# Patient Record
Sex: MALE | Race: WHITE | NOT HISPANIC OR LATINO | Employment: OTHER | URBAN - METROPOLITAN AREA
[De-identification: names, ages, dates, MRNs, and addresses within clinical notes are randomized per-mention and may not be internally consistent; named-entity substitution may affect disease eponyms.]

---

## 2022-04-15 ENCOUNTER — LAB (OUTPATIENT)
Dept: LAB | Facility: HOSPITAL | Age: 52
End: 2022-04-15

## 2022-04-15 ENCOUNTER — TRANSCRIBE ORDERS (OUTPATIENT)
Dept: ADMINISTRATIVE | Facility: HOSPITAL | Age: 52
End: 2022-04-15

## 2022-04-15 DIAGNOSIS — I10 UNCONTROLLED HYPERTENSION: ICD-10-CM

## 2022-04-15 DIAGNOSIS — I10 UNCONTROLLED HYPERTENSION: Primary | ICD-10-CM

## 2022-04-15 PROCEDURE — 82088 ASSAY OF ALDOSTERONE: CPT

## 2022-04-15 PROCEDURE — 36415 COLL VENOUS BLD VENIPUNCTURE: CPT

## 2022-04-15 PROCEDURE — 84244 ASSAY OF RENIN: CPT

## 2022-04-28 LAB
ALDOST SERPL-MCNC: 13.4 NG/DL (ref 0–30)
ALDOST/RENIN PLAS-RTO: 5 {RATIO} (ref 0–30)
RENIN PLAS-CCNC: 2.69 NG/ML/HR (ref 0.17–5.38)

## 2022-05-11 ENCOUNTER — TRANSCRIBE ORDERS (OUTPATIENT)
Dept: ADMINISTRATIVE | Facility: HOSPITAL | Age: 52
End: 2022-05-11

## 2022-05-11 DIAGNOSIS — N18.6 END STAGE RENAL FAILURE ON DIALYSIS: Primary | ICD-10-CM

## 2022-05-11 DIAGNOSIS — Z99.2 END STAGE RENAL FAILURE ON DIALYSIS: Primary | ICD-10-CM

## 2022-05-13 ENCOUNTER — OFFICE VISIT (OUTPATIENT)
Dept: SURGERY | Facility: CLINIC | Age: 52
End: 2022-05-13

## 2022-05-13 VITALS — HEIGHT: 68 IN | BODY MASS INDEX: 27.61 KG/M2 | WEIGHT: 182.2 LBS

## 2022-05-13 DIAGNOSIS — R10.9 ABDOMINAL PAIN, UNSPECIFIED ABDOMINAL LOCATION: Primary | ICD-10-CM

## 2022-05-13 PROCEDURE — 89051 BODY FLUID CELL COUNT: CPT | Performed by: SURGERY

## 2022-05-13 PROCEDURE — 99203 OFFICE O/P NEW LOW 30 MIN: CPT | Performed by: SURGERY

## 2022-05-13 PROCEDURE — 87015 SPECIMEN INFECT AGNT CONCNTJ: CPT | Performed by: SURGERY

## 2022-05-13 PROCEDURE — 87070 CULTURE OTHR SPECIMN AEROBIC: CPT | Performed by: SURGERY

## 2022-05-13 PROCEDURE — 87205 SMEAR GRAM STAIN: CPT | Performed by: SURGERY

## 2022-05-13 RX ORDER — CLONIDINE HYDROCHLORIDE 0.1 MG/1
0.1 TABLET, EXTENDED RELEASE ORAL DAILY
COMMUNITY
Start: 2022-04-07 | End: 2022-07-08

## 2022-05-13 RX ORDER — CARVEDILOL 25 MG/1
25 TABLET ORAL 2 TIMES DAILY
COMMUNITY
Start: 2022-03-15

## 2022-05-13 RX ORDER — GENTAMICIN SULFATE 1 MG/G
1 CREAM TOPICAL DAILY
COMMUNITY
Start: 2022-04-24 | End: 2022-11-07

## 2022-05-13 RX ORDER — CALCITRIOL 0.25 UG/1
0.25 CAPSULE, LIQUID FILLED ORAL DAILY
COMMUNITY
Start: 2022-04-07 | End: 2022-10-17

## 2022-05-13 RX ORDER — HYDRALAZINE HYDROCHLORIDE 100 MG/1
100 TABLET, FILM COATED ORAL 3 TIMES DAILY
COMMUNITY
Start: 2022-04-07

## 2022-05-13 RX ORDER — NIFEDIPINE 30 MG/1
30 TABLET, EXTENDED RELEASE ORAL DAILY
COMMUNITY
Start: 2022-04-24 | End: 2022-07-08

## 2022-05-13 RX ORDER — POLYETHYLENE GLYCOL 3350 17 G/17G
17 POWDER, FOR SOLUTION ORAL 2 TIMES DAILY PRN
Qty: 60 EACH | Refills: 2 | Status: SHIPPED | OUTPATIENT
Start: 2022-05-13

## 2022-05-13 RX ORDER — ERGOCALCIFEROL 1.25 MG/1
50000 CAPSULE ORAL WEEKLY
COMMUNITY
Start: 2022-04-23

## 2022-05-13 NOTE — PROGRESS NOTES
Chief Complaint   Patient presents with   • NEW PT- LOWER ABDONMINAL PAIN       Subjective      Saravanan Hutchison is a 51 y.o. male who is referred by Dr. Vallejo for evaluation of abdominal pain with peritoneal dialysis.  The patient has end-stage renal disease and has been on peritoneal dialysis for approximately 1 year.  The catheter was placed in [place].  He never have any problem with the catheter until last month when he started having pain when catheter is draining peritoneal fluid.  Initially the pain was mild and associated only with draining with .  For the past week the pain has been getting persistently worse, the pain is located at the suprapubic area and radiates to bilateral testicles and rectum.  The pain is sharp in nature and exacerbated by peritoneal fluid drainage.  He does not have any pain with peritoneal fluid and surgeon.  Pains get better with stopping peritoneal drainage.  He has not have any problem draining the catheter otherwise.  He denies any fevers or chills.  He did not notice any change in the color of the fluid.  Patient reports constipation of 1 hard bowel movement every 2 days.  He takes lactulose for chronic constipation.  He reports no fevers but complains of chills.  He has been able to tolerate diet without any nausea or vomiting.  He denies any bulging on the abdominal wall.     Past Medical History:   Diagnosis Date   • CHF (congestive heart failure) (HCC) May 2021   •  End-stage renal disease on peritoneal dialysis 2021   • Coronary artery disease May 2021   • Hypertension Sept. 1990     Past Surgical History:   Procedure Laterality Date   • APPENDECTOMY  1994   -Peritoneal dialysis catheter placement 2021      Current Outpatient Medications:   •  carvedilol (COREG) 25 MG tablet, Take 25 mg by mouth 2 (Two) Times a Day., Disp: , Rfl:   •  cloNIDine HCl ER 0.1 MG tablet sustained-release 12 hour tablet, Take 0.1 mg by mouth Daily., Disp: , Rfl:   •  gentamicin (GARAMYCIN)  0.1 % cream, APPLY TO EXIT SITE AS DIRECTED, Disp: , Rfl:   •  hydrALAZINE (APRESOLINE) 100 MG tablet, Take 100 mg by mouth 3 (Three) Times a Day., Disp: , Rfl:   •  NIFEdipine XL (PROCARDIA XL) 30 MG 24 hr tablet, Take 30 mg by mouth Daily., Disp: , Rfl:   •  calcitriol (ROCALTROL) 0.25 MCG capsule, Take 0.25 mcg by mouth Daily., Disp: , Rfl:   •  polyethylene glycol (MIRALAX) 17 g packet, Take 17 g by mouth 2 (Two) Times a Day As Needed (constipation)., Disp: 60 each, Rfl: 2  •  vitamin D (ERGOCALCIFEROL) 1.25 MG (55434 UT) capsule capsule, Take 50,000 Units by mouth 1 (One) Time Per Week., Disp: , Rfl:     No Known Allergies    Family History   Problem Relation Age of Onset   • Cancer Mother    • Hypertension Mother    • Heart disease Maternal Grandfather        Social History     Socioeconomic History   • Marital status: Single   Tobacco Use   • Smoking status: Former Smoker     Packs/day: 1.00     Years: 20.00     Pack years: 20.00     Types: Cigarettes     Start date: 3/15/1987     Quit date: 2022     Years since quittin.2   • Smokeless tobacco: Never Used   Vaping Use   • Vaping Use: Never used   Substance and Sexual Activity   • Alcohol use: Never   • Drug use: Never   • Sexual activity: Not Currently     Partners: Female     REVIEW OF SYSTEMS    Review of Systems   Constitutional: Positive for chills. Negative for activity change, appetite change, diaphoresis and fever.   HENT: Negative for congestion, postnasal drip and rhinorrhea.    Eyes: Negative for discharge and itching.   Respiratory: Negative for apnea, cough, choking, chest tightness and shortness of breath.    Cardiovascular: Negative for chest pain, palpitations and leg swelling.   Gastrointestinal: Positive for abdominal pain, constipation and rectal pain. Negative for abdominal distention, anal bleeding, blood in stool, diarrhea, nausea and vomiting.   Endocrine: Positive for cold intolerance. Negative for heat intolerance.  "  Genitourinary: Positive for dysuria. Negative for difficulty urinating.   Musculoskeletal: Negative for arthralgias and back pain.   Skin: Negative for color change and pallor.   Allergic/Immunologic: Negative for environmental allergies and food allergies.   Neurological: Negative for dizziness and numbness.   Hematological: Negative for adenopathy. Does not bruise/bleed easily.   Psychiatric/Behavioral: Negative for agitation and decreased concentration.       Physical Examination  Ht 172.7 cm (68\")   Wt 82.6 kg (182 lb 3.2 oz)   BMI 27.70 kg/m²   Body mass index is 27.7 kg/m².  Physical Exam  Constitutional:       Appearance: He is normal weight.   HENT:      Head: Normocephalic and atraumatic.      Nose: Nose normal.      Mouth/Throat:      Pharynx: Oropharynx is clear.   Eyes:      General: No scleral icterus.     Conjunctiva/sclera: Conjunctivae normal.   Cardiovascular:      Rate and Rhythm: Normal rate and regular rhythm.   Pulmonary:      Effort: Pulmonary effort is normal.      Breath sounds: Normal breath sounds.   Abdominal:      General: Abdomen is flat. Bowel sounds are normal. There is no distension.      Palpations: Abdomen is soft. There is no mass.      Tenderness: There is abdominal tenderness.      Hernia: No hernia is present.      Comments: Mild tenderness over the suprapubic area.  Tenderness over bilateral inguinal canals, no evidence of hernia, peritoneal dialysis catheter in place.  There is excellent flow of the catheter and there is no evidence of obstruction.  There is slightly cloudy fluid from the peritoneal dialysate.   Musculoskeletal:      Cervical back: Normal range of motion and neck supple.   Neurological:      Mental Status: He is alert.         Assessment:   Saravanan Hutchison is a 51 y.o. male with ESRD on peritoneal dialysis having a lot of pain bilateral testicles and groins with catheter drainage.  I discussed with him first that he should probably start manual exchanges " until his pain is resolved.  I am not sure what is the reason for his pain but he is fluid to me looks a little bit cloudy.  We will send cultures and cell count to assess for peritonitis.  Has been having dysuria so we will send a UA make sure he does not have a urinary tract infection.  He has constipation so we will start him on MiraLAX twice daily until having 1 or 2 bowel movements per day.  I will order abdominal x-ray to assess peritoneal dialysis catheter position and CT scan of the abdomen pelvis order has been ordered by Dr. Vallejo.   I also would like him to get CBC as well as BMP.  Discussed with the patient that pending results we will discuss about further steps.  This was discussed with Dr. Vallejo      Plan:     -CBC, BMP, UA  -Abdominal x-ray  -CT scan abdomen pelvis  -MiraLAX p.o. twice daily  -Fluid was sent for culture and cell count, will follow results    Orders Placed This Encounter   Procedures   • Body Fluid Culture - Body Fluid, Peritoneum     Order Specific Question:   Release to patient     Answer:   Immediate   • XR abdomen 3 vw     Standing Status:   Future     Standing Expiration Date:   5/13/2023     Order Specific Question:   Reason for Exam:     Answer:   assess pd catheter position     Order Specific Question:   Release to patient     Answer:   Immediate   • Urinalysis With Culture If Indicated -     Standing Status:   Future     Standing Expiration Date:   5/13/2023     Order Specific Question:   Release to patient     Answer:   Immediate   • Basic Metabolic Panel     Standing Status:   Future     Standing Expiration Date:   5/13/2023     Order Specific Question:   Release to patient     Answer:   Immediate   • Body Fluid Cell Count With Differential - Body Fluid, Peritoneum     Order Specific Question:   Release to patient     Answer:   Immediate   • Body fluid cell count - Body Fluid, Peritoneum     Order Specific Question:   Release to patient     Answer:   Immediate   • CBC &  Differential     Standing Status:   Future     Standing Expiration Date:   5/13/2023     Order Specific Question:   Manual Differential     Answer:   No     Order Specific Question:   Release to patient     Answer:   Immediate         Danie Morocho MD  General, Minimally Invasive and Endoscopic Surgery  [facility]    [address], [address]  [place], [place], [address]  P: [phone]  F: [phone]

## 2022-05-14 ENCOUNTER — HOSPITAL ENCOUNTER (OUTPATIENT)
Dept: GENERAL RADIOLOGY | Facility: HOSPITAL | Age: 52
Discharge: HOME | End: 2022-05-14

## 2022-05-14 ENCOUNTER — LAB (OUTPATIENT)
Dept: LAB | Facility: HOSPITAL | Age: 52
End: 2022-05-14

## 2022-05-14 DIAGNOSIS — R10.9 ABDOMINAL PAIN, UNSPECIFIED ABDOMINAL LOCATION: ICD-10-CM

## 2022-05-14 LAB
ANION GAP SERPL CALCULATED.3IONS-SCNC: 12.7 MMOL/L (ref 5–15)
BACTERIA UR QL AUTO: NORMAL /HPF
BASOPHILS # BLD AUTO: 0.08 10*3/MM3 (ref 0–0.2)
BASOPHILS NFR BLD AUTO: 0.9 % (ref 0–1.5)
BILIRUB UR QL STRIP: NEGATIVE
BUN SERPL-MCNC: 32 MG/DL (ref 6–20)
BUN/CREAT SERPL: 12.7 (ref 7–25)
CALCIUM SPEC-SCNC: 9.4 MG/DL (ref 8.6–10.5)
CHLORIDE SERPL-SCNC: 103 MMOL/L (ref 98–107)
CLARITY UR: CLEAR
CO2 SERPL-SCNC: 21.3 MMOL/L (ref 22–29)
COLOR UR: YELLOW
CREAT SERPL-MCNC: 2.52 MG/DL (ref 0.76–1.27)
DEPRECATED RDW RBC AUTO: 46.3 FL (ref 37–54)
EGFRCR SERPLBLD CKD-EPI 2021: 30.4 ML/MIN/1.73
EOSINOPHIL # BLD AUTO: 0.56 10*3/MM3 (ref 0–0.4)
EOSINOPHIL NFR BLD AUTO: 6.5 % (ref 0.3–6.2)
ERYTHROCYTE [DISTWIDTH] IN BLOOD BY AUTOMATED COUNT: 14.4 % (ref 12.3–15.4)
GLUCOSE SERPL-MCNC: 101 MG/DL (ref 65–99)
GLUCOSE UR STRIP-MCNC: NEGATIVE MG/DL
HCT VFR BLD AUTO: 43.5 % (ref 37.5–51)
HGB BLD-MCNC: 14.2 G/DL (ref 13–17.7)
HGB UR QL STRIP.AUTO: NEGATIVE
HYALINE CASTS UR QL AUTO: NORMAL /LPF
IMM GRANULOCYTES # BLD AUTO: 0.03 10*3/MM3 (ref 0–0.05)
IMM GRANULOCYTES NFR BLD AUTO: 0.3 % (ref 0–0.5)
KETONES UR QL STRIP: NEGATIVE
LEUKOCYTE ESTERASE UR QL STRIP.AUTO: NEGATIVE
LYMPHOCYTES # BLD AUTO: 1.48 10*3/MM3 (ref 0.7–3.1)
LYMPHOCYTES NFR BLD AUTO: 17.2 % (ref 19.6–45.3)
MCH RBC QN AUTO: 28.5 PG (ref 26.6–33)
MCHC RBC AUTO-ENTMCNC: 32.6 G/DL (ref 31.5–35.7)
MCV RBC AUTO: 87.3 FL (ref 79–97)
MONOCYTES # BLD AUTO: 0.69 10*3/MM3 (ref 0.1–0.9)
MONOCYTES NFR BLD AUTO: 8 % (ref 5–12)
NEUTROPHILS NFR BLD AUTO: 5.78 10*3/MM3 (ref 1.7–7)
NEUTROPHILS NFR BLD AUTO: 67.1 % (ref 42.7–76)
NITRITE UR QL STRIP: NEGATIVE
NRBC BLD AUTO-RTO: 0 /100 WBC (ref 0–0.2)
PH UR STRIP.AUTO: 6 [PH] (ref 5–8)
PLATELET # BLD AUTO: 253 10*3/MM3 (ref 140–450)
PMV BLD AUTO: 11 FL (ref 6–12)
POTASSIUM SERPL-SCNC: 3.7 MMOL/L (ref 3.5–5.2)
PROT UR QL STRIP: ABNORMAL
RBC # BLD AUTO: 4.98 10*6/MM3 (ref 4.14–5.8)
RBC # UR STRIP: NORMAL /HPF
REF LAB TEST METHOD: NORMAL
SODIUM SERPL-SCNC: 137 MMOL/L (ref 136–145)
SP GR UR STRIP: 1.01 (ref 1–1.03)
SQUAMOUS #/AREA URNS HPF: NORMAL /HPF
UROBILINOGEN UR QL STRIP: ABNORMAL
WBC # UR STRIP: NORMAL /HPF
WBC NRBC COR # BLD: 8.62 10*3/MM3 (ref 3.4–10.8)

## 2022-05-14 PROCEDURE — 74019 RADEX ABDOMEN 2 VIEWS: CPT

## 2022-05-14 PROCEDURE — 80048 BASIC METABOLIC PNL TOTAL CA: CPT

## 2022-05-14 PROCEDURE — 36415 COLL VENOUS BLD VENIPUNCTURE: CPT

## 2022-05-14 PROCEDURE — 85025 COMPLETE CBC W/AUTO DIFF WBC: CPT

## 2022-05-14 PROCEDURE — 81001 URINALYSIS AUTO W/SCOPE: CPT

## 2022-05-16 LAB
APPEARANCE FLD: CLEAR
COLOR FLD: NORMAL
EOSINOPHIL NFR FLD MANUAL: 29 %
LYMPHOCYTES NFR FLD MANUAL: 39 %
METHOD: NORMAL
MONOCYTES NFR FLD: 18 %
NEUTROPHILS NFR FLD MANUAL: 14 %
NUC CELL # FLD: 35 /MM3
RBC # FLD AUTO: 13 /MM3

## 2022-05-19 ENCOUNTER — HOSPITAL ENCOUNTER (OUTPATIENT)
Dept: CT IMAGING | Facility: HOSPITAL | Age: 52
Discharge: HOME | End: 2022-05-19
Admitting: INTERNAL MEDICINE

## 2022-05-19 DIAGNOSIS — N18.6 END STAGE RENAL FAILURE ON DIALYSIS: ICD-10-CM

## 2022-05-19 DIAGNOSIS — Z99.2 END STAGE RENAL FAILURE ON DIALYSIS: ICD-10-CM

## 2022-05-19 LAB
BACTERIA FLD CULT: NORMAL
GRAM STN SPEC: NORMAL

## 2022-05-19 PROCEDURE — 74176 CT ABD & PELVIS W/O CONTRAST: CPT

## 2022-06-02 ENCOUNTER — APPOINTMENT (OUTPATIENT)
Dept: CT IMAGING | Facility: HOSPITAL | Age: 52
End: 2022-06-02

## 2022-06-08 ENCOUNTER — TELEPHONE (OUTPATIENT)
Dept: SURGERY | Facility: CLINIC | Age: 52
End: 2022-06-08

## 2022-06-08 ENCOUNTER — PREP FOR SURGERY (OUTPATIENT)
Dept: OTHER | Facility: HOSPITAL | Age: 52
End: 2022-06-08

## 2022-06-08 DIAGNOSIS — T85.611A PERITONEAL DIALYSIS CATHETER DYSFUNCTION: Primary | ICD-10-CM

## 2022-06-08 RX ORDER — CEFAZOLIN SODIUM 2 G/100ML
2 INJECTION, SOLUTION INTRAVENOUS ONCE
Status: CANCELLED | OUTPATIENT
Start: 2022-07-11 | End: 2022-06-08

## 2022-06-08 NOTE — TELEPHONE ENCOUNTER
I spoke with the patient about his results including CT scan abdominal KUB and labs.  Everything looks stable and negative for any infection.  He continues to have pain with manual exchanges.  Discussed with him about the need to perform diagnostic laparoscopy with possible peritoneal dialysis catheter revision.  Risk and benefits of procedure including bleeding, infection, intra-abdominal injuries discussed in detail with the patient that verbalized understanding and agreed with plan

## 2022-07-08 ENCOUNTER — PRE-ADMISSION TESTING (OUTPATIENT)
Dept: PREADMISSION TESTING | Facility: HOSPITAL | Age: 52
End: 2022-07-08

## 2022-07-08 VITALS
SYSTOLIC BLOOD PRESSURE: 137 MMHG | BODY MASS INDEX: 26.42 KG/M2 | HEART RATE: 69 BPM | HEIGHT: 69 IN | RESPIRATION RATE: 18 BRPM | WEIGHT: 178.4 LBS | TEMPERATURE: 98.3 F | OXYGEN SATURATION: 98 % | DIASTOLIC BLOOD PRESSURE: 95 MMHG

## 2022-07-08 DIAGNOSIS — T85.611A PERITONEAL DIALYSIS CATHETER DYSFUNCTION: ICD-10-CM

## 2022-07-08 LAB
ANION GAP SERPL CALCULATED.3IONS-SCNC: 11.5 MMOL/L (ref 5–15)
BUN SERPL-MCNC: 26 MG/DL (ref 6–20)
BUN/CREAT SERPL: 9.6 (ref 7–25)
CALCIUM SPEC-SCNC: 9.4 MG/DL (ref 8.6–10.5)
CHLORIDE SERPL-SCNC: 101 MMOL/L (ref 98–107)
CO2 SERPL-SCNC: 24.5 MMOL/L (ref 22–29)
CREAT SERPL-MCNC: 2.7 MG/DL (ref 0.76–1.27)
DEPRECATED RDW RBC AUTO: 41.4 FL (ref 37–54)
EGFRCR SERPLBLD CKD-EPI 2021: 27.8 ML/MIN/1.73
ERYTHROCYTE [DISTWIDTH] IN BLOOD BY AUTOMATED COUNT: 13.6 % (ref 12.3–15.4)
GLUCOSE SERPL-MCNC: 85 MG/DL (ref 65–99)
HCT VFR BLD AUTO: 42.2 % (ref 37.5–51)
HGB BLD-MCNC: 13.8 G/DL (ref 13–17.7)
MCH RBC QN AUTO: 27.7 PG (ref 26.6–33)
MCHC RBC AUTO-ENTMCNC: 32.7 G/DL (ref 31.5–35.7)
MCV RBC AUTO: 84.6 FL (ref 79–97)
PLATELET # BLD AUTO: 255 10*3/MM3 (ref 140–450)
PMV BLD AUTO: 10.6 FL (ref 6–12)
POTASSIUM SERPL-SCNC: 4.9 MMOL/L (ref 3.5–5.2)
QT INTERVAL: 436 MS
RBC # BLD AUTO: 4.99 10*6/MM3 (ref 4.14–5.8)
SARS-COV-2 ORF1AB RESP QL NAA+PROBE: NOT DETECTED
SODIUM SERPL-SCNC: 137 MMOL/L (ref 136–145)
WBC NRBC COR # BLD: 8.48 10*3/MM3 (ref 3.4–10.8)

## 2022-07-08 PROCEDURE — 93010 ELECTROCARDIOGRAM REPORT: CPT | Performed by: INTERNAL MEDICINE

## 2022-07-08 PROCEDURE — 93005 ELECTROCARDIOGRAM TRACING: CPT

## 2022-07-08 PROCEDURE — C9803 HOPD COVID-19 SPEC COLLECT: HCPCS

## 2022-07-08 PROCEDURE — 80048 BASIC METABOLIC PNL TOTAL CA: CPT

## 2022-07-08 PROCEDURE — 85027 COMPLETE CBC AUTOMATED: CPT

## 2022-07-08 PROCEDURE — 36415 COLL VENOUS BLD VENIPUNCTURE: CPT

## 2022-07-08 PROCEDURE — U0004 COV-19 TEST NON-CDC HGH THRU: HCPCS

## 2022-07-08 RX ORDER — CLONIDINE HYDROCHLORIDE 0.2 MG/1
0.2 TABLET ORAL 3 TIMES DAILY
COMMUNITY
Start: 2022-07-05

## 2022-07-08 RX ORDER — ASPIRIN 81 MG/1
81 TABLET ORAL DAILY
COMMUNITY
Start: 2022-01-27

## 2022-07-08 RX ORDER — NIFEDIPINE 90 MG/1
90 TABLET, EXTENDED RELEASE ORAL 2 TIMES DAILY
COMMUNITY
Start: 2022-05-16

## 2022-07-08 RX ORDER — CHLORHEXIDINE GLUCONATE 500 MG/1
1 CLOTH TOPICAL TAKE AS DIRECTED
COMMUNITY
End: 2022-07-11 | Stop reason: HOSPADM

## 2022-07-08 RX ORDER — EPLERENONE 25 MG/1
25 TABLET, FILM COATED ORAL DAILY
COMMUNITY
Start: 2022-05-16

## 2022-07-08 RX ORDER — FUROSEMIDE 40 MG/1
40 TABLET ORAL 2 TIMES DAILY
COMMUNITY
Start: 2022-01-27

## 2022-07-08 NOTE — DISCHARGE INSTRUCTIONS
Take the following medications the morning of surgery:  NIFEDIPINE, CARVEDILOL, HYDRALAZINE, CLONIDINE    Arrive to hospital on your day of surgery at  5:30 AM.    If you are on prescription narcotic pain medication to control your pain you may also take that medication the morning of surgery.    General Instructions:  Do not eat solid food after midnight the night before surgery.  You may drink clear liquids day of surgery but must stop at least one hour before your hospital arrival time.  It is beneficial for you to have a clear drink that contains carbohydrates the day of surgery.  We suggest a 12 to 20 ounce bottle of Gatorade or Powerade for non-diabetic patients or a 12 to 20 ounce bottle of G2 or Powerade Zero for diabetic patients. (Pediatric patients, are not advised to drink a 12 to 20 ounce carbohydrate drink)    Clear liquids are liquids you can see through.  Nothing red in color.     Plain water                               Sports drinks  Sodas                                   Gelatin (Jell-O)  Fruit juices without pulp such as white grape juice and apple juice  Popsicles that contain no fruit or yogurt  Tea or coffee (no cream or milk added)  Gatorade / Powerade  G2 / Powerade Zero    Infants may have breast milk up to four hours before surgery.  Infants drinking formula may drink formula up to six hours before surgery.   Patients who avoid smoking, chewing tobacco and alcohol for 4 weeks prior to surgery have a reduced risk of post-operative complications.  Quit smoking as many days before surgery as you can.  Do not smoke, use chewing tobacco or drink alcohol the day of surgery.   If applicable bring your C-PAP/ BI-PAP machine.  Bring any papers given to you in the doctor’s office.  Wear clean comfortable clothes.  Do not wear contact lenses, false eyelashes or make-up.  Bring a case for your glasses.   Bring crutches or walker if applicable.  Remove all piercings.  Leave jewelry and any other  valuables at home.  Hair extensions with metal clips must be removed prior to surgery.  The Pre-Admission Testing nurse will instruct you to bring medications if unable to obtain an accurate list in Pre-Admission Testing.        If you were given a blood bank ID arm band remember to bring it with you the day of surgery.    Preventing a Surgical Site Infection:  For 2 to 3 days before surgery, avoid shaving with a razor because the razor can irritate skin and make it easier to develop an infection.    Any areas of open skin can increase the risk of a post-operative wound infection by allowing bacteria to enter and travel throughout the body.  Notify your surgeon if you have any skin wounds / rashes even if it is not near the expected surgical site.  The area will need assessed to determine if surgery should be delayed until it is healed.  The night prior to surgery shower using a fresh bar of anti-bacterial soap (such as Dial) and clean washcloth.  Sleep in a clean bed with clean clothing.  Do not allow pets to sleep with you.  Shower on the morning of surgery using a fresh bar of anti-bacterial soap (such as Dial) and clean washcloth.  Dry with a clean towel and dress in clean clothing.  Ask your surgeon if you will be receiving antibiotics prior to surgery.  Make sure you, your family, and all healthcare providers clean their hands with soap and water or an alcohol based hand  before caring for you or your wound.    Day of surgery:  Your arrival time is approximately two hours before your scheduled surgery time.  Upon arrival, a Pre-op nurse and Anesthesiologist will review your health history, obtain vital signs, and answer questions you may have.  The only belongings needed at this time will be a list of your home medications and if applicable your C-PAP/BI-PAP machine.  A Pre-op nurse will start an IV and you may receive medication in preparation for surgery, including something to help you relax.      Please be aware that surgery does come with discomfort.  We want to make every effort to control your discomfort so please discuss any uncontrolled symptoms with your nurse.   Your doctor will most likely have prescribed pain medications.      If you are going home after surgery you will receive individualized written care instructions before being discharged.  A responsible adult must drive you to and from the hospital on the day of your surgery and stay with you for 24 hours.  Discharge prescriptions can be filled by the hospital pharmacy during regular pharmacy hours.  If you are having surgery late in the day/evening your prescription may be e-prescribed to your pharmacy.  Please verify your pharmacy hours or chose a 24 hour pharmacy to avoid not having access to your prescription because your pharmacy has closed for the day.    If you are staying overnight following surgery, you will be transported to your hospital room following the recovery period.  [facility] has all private rooms.    If you have any questions please call Pre-Admission Testing at [phone].  Deductibles and co-payments are collected on the day of service. Please be prepared to pay the required co-pay, deductible or deposit on the day of service as defined by your plan.    Patient Education for Self-Quarantine Process    Following your COVID testing, we strongly recommend that you wear a mask when you are with other people and practice social distancing.   Limit your activities to only required outings.  Wash your hands with soap and water frequently for at least 20 seconds.   Avoid touching your eyes, nose and mouth with unwashed hands.  Do not share anything - utensils, drinking glasses, food from the same bowl.   Sanitize household surfaces daily. Include all high touch areas (door handles, light switches, phones, countertops, etc.)    Call your surgeon immediately if you experience any of the following  symptoms:  Sore Throat  Shortness of Breath or difficulty breathing  Cough  Chills  Body soreness or muscle pain  Headache  Fever  New loss of taste or smell  Do not arrive for your surgery ill.  Your procedure will need to be rescheduled to another time.  You will need to call your physician before the day of surgery to avoid any unnecessary exposure to hospital staff as well as other patients.     CHLORHEXIDINE CLOTH INSTRUCTIONS  The morning of surgery follow these instructions using the Chlorhexidine cloths you've been given.  These steps reduce bacteria on the body.  Do not use the cloths near your eyes, ears mouth, genitalia or on open wounds.  Throw the cloths away after use but do not try to flush them down a toilet.      Open and remove one cloth at a time from the package.    Leave the cloth unfolded and begin the bathing.  Massage the skin with the cloths using gentle pressure to remove bacteria.  Do not scrub harshly.   Follow the steps below with one 2% CHG cloth per area (6 total cloths).  One cloth for neck, shoulders and chest.  One cloth for both arms, hands, fingers and underarms (do underarms last).  One cloth for the abdomen followed by groin.  One cloth for right leg and foot including between the toes.  One cloth for left leg and foot including between the toes.  The last cloth is to be used for the back of the neck, back and buttocks.    Allow the CHG to air dry 3 minutes on the skin which will give it time to work and decrease the chance of irritation.  The skin may feel sticky until it is dry.  Do not rinse with water or any other liquid or you will lose the beneficial effects of the CHG.  If mild skin irritation occurs, do rinse the skin to remove the CHG.  Report this to the nurse at time of admission.  Do not apply lotions, creams, ointments, deodorants or perfumes after using the clothes. Dress in clean clothes before coming to the hospital.

## 2022-07-10 ENCOUNTER — ANESTHESIA EVENT (OUTPATIENT)
Dept: PERIOP | Facility: HOSPITAL | Age: 52
End: 2022-07-10

## 2022-07-11 ENCOUNTER — HOSPITAL ENCOUNTER (OUTPATIENT)
Facility: HOSPITAL | Age: 52
Setting detail: HOSPITAL OUTPATIENT SURGERY
Discharge: HOME | End: 2022-07-11
Attending: SURGERY | Admitting: SURGERY

## 2022-07-11 ENCOUNTER — ANESTHESIA (OUTPATIENT)
Dept: PERIOP | Facility: HOSPITAL | Age: 52
End: 2022-07-11

## 2022-07-11 VITALS
SYSTOLIC BLOOD PRESSURE: 143 MMHG | OXYGEN SATURATION: 98 % | HEART RATE: 67 BPM | HEIGHT: 69 IN | WEIGHT: 173.8 LBS | TEMPERATURE: 97 F | BODY MASS INDEX: 25.74 KG/M2 | DIASTOLIC BLOOD PRESSURE: 109 MMHG | RESPIRATION RATE: 14 BRPM

## 2022-07-11 DIAGNOSIS — T85.611A PERITONEAL DIALYSIS CATHETER DYSFUNCTION: ICD-10-CM

## 2022-07-11 PROBLEM — I50.9 CHF (CONGESTIVE HEART FAILURE): Status: ACTIVE | Noted: 2022-07-11

## 2022-07-11 PROBLEM — I10 HYPERTENSION: Status: ACTIVE | Noted: 2022-07-11

## 2022-07-11 PROBLEM — Z99.2 PERITONEAL DIALYSIS CATHETER IN PLACE: Status: ACTIVE | Noted: 2022-07-11

## 2022-07-11 PROBLEM — I25.10 CAD (CORONARY ARTERY DISEASE): Status: ACTIVE | Noted: 2022-07-11

## 2022-07-11 LAB
ANION GAP SERPL CALCULATED.3IONS-SCNC: 14 MMOL/L (ref 5–15)
BUN SERPL-MCNC: 38 MG/DL (ref 6–20)
BUN/CREAT SERPL: 12 (ref 7–25)
CALCIUM SPEC-SCNC: 9.4 MG/DL (ref 8.6–10.5)
CHLORIDE SERPL-SCNC: 100 MMOL/L (ref 98–107)
CO2 SERPL-SCNC: 21 MMOL/L (ref 22–29)
CREAT SERPL-MCNC: 3.16 MG/DL (ref 0.76–1.27)
EGFRCR SERPLBLD CKD-EPI 2021: 23.1 ML/MIN/1.73
GLUCOSE SERPL-MCNC: 91 MG/DL (ref 65–99)
POTASSIUM SERPL-SCNC: 4.1 MMOL/L (ref 3.5–5.2)
SODIUM SERPL-SCNC: 135 MMOL/L (ref 136–145)

## 2022-07-11 PROCEDURE — 25010000002 CEFAZOLIN IN DEXTROSE 2-4 GM/100ML-% SOLUTION: Performed by: SURGERY

## 2022-07-11 PROCEDURE — 49325 LAP REVISION PERM IP CATH: CPT | Performed by: SURGERY

## 2022-07-11 PROCEDURE — 25010000002 HEPARIN (PORCINE) PER 1000 UNITS: Performed by: SURGERY

## 2022-07-11 PROCEDURE — 25010000002 FENTANYL CITRATE (PF) 50 MCG/ML SOLUTION: Performed by: NURSE ANESTHETIST, CERTIFIED REGISTERED

## 2022-07-11 PROCEDURE — 25010000002 PROPOFOL 10 MG/ML EMULSION: Performed by: NURSE ANESTHETIST, CERTIFIED REGISTERED

## 2022-07-11 PROCEDURE — 25010000002 NEOSTIGMINE 5 MG/10ML SOLUTION: Performed by: NURSE ANESTHETIST, CERTIFIED REGISTERED

## 2022-07-11 PROCEDURE — 25010000002 HYDRALAZINE PER 20 MG: Performed by: NURSE ANESTHETIST, CERTIFIED REGISTERED

## 2022-07-11 PROCEDURE — 25010000002 ONDANSETRON PER 1 MG: Performed by: NURSE ANESTHETIST, CERTIFIED REGISTERED

## 2022-07-11 PROCEDURE — 80048 BASIC METABOLIC PNL TOTAL CA: CPT | Performed by: SURGERY

## 2022-07-11 PROCEDURE — 25010000002 DEXAMETHASONE PER 1 MG: Performed by: NURSE ANESTHETIST, CERTIFIED REGISTERED

## 2022-07-11 PROCEDURE — S0260 H&P FOR SURGERY: HCPCS | Performed by: SURGERY

## 2022-07-11 RX ORDER — ROCURONIUM BROMIDE 10 MG/ML
INJECTION, SOLUTION INTRAVENOUS AS NEEDED
Status: DISCONTINUED | OUTPATIENT
Start: 2022-07-11 | End: 2022-07-11 | Stop reason: SURG

## 2022-07-11 RX ORDER — SODIUM CHLORIDE 9 MG/ML
9 INJECTION, SOLUTION INTRAVENOUS CONTINUOUS
Status: DISCONTINUED | OUTPATIENT
Start: 2022-07-11 | End: 2022-07-11 | Stop reason: HOSPADM

## 2022-07-11 RX ORDER — OXYCODONE HYDROCHLORIDE AND ACETAMINOPHEN 5; 325 MG/1; MG/1
1 TABLET ORAL ONCE AS NEEDED
Status: DISCONTINUED | OUTPATIENT
Start: 2022-07-11 | End: 2022-07-11 | Stop reason: HOSPADM

## 2022-07-11 RX ORDER — ACETAMINOPHEN 325 MG/1
650 TABLET ORAL EVERY 4 HOURS PRN
Qty: 40 TABLET | Refills: 0 | Status: SHIPPED | OUTPATIENT
Start: 2022-07-11 | End: 2022-08-16

## 2022-07-11 RX ORDER — SODIUM CHLORIDE 0.9 % (FLUSH) 0.9 %
10 SYRINGE (ML) INJECTION AS NEEDED
Status: DISCONTINUED | OUTPATIENT
Start: 2022-07-11 | End: 2022-07-11 | Stop reason: HOSPADM

## 2022-07-11 RX ORDER — FENTANYL CITRATE 50 UG/ML
50 INJECTION, SOLUTION INTRAMUSCULAR; INTRAVENOUS
Status: DISCONTINUED | OUTPATIENT
Start: 2022-07-11 | End: 2022-07-11 | Stop reason: HOSPADM

## 2022-07-11 RX ORDER — ONDANSETRON 2 MG/ML
4 INJECTION INTRAMUSCULAR; INTRAVENOUS ONCE AS NEEDED
Status: DISCONTINUED | OUTPATIENT
Start: 2022-07-11 | End: 2022-07-11 | Stop reason: HOSPADM

## 2022-07-11 RX ORDER — SODIUM CHLORIDE, SODIUM LACTATE, POTASSIUM CHLORIDE, CALCIUM CHLORIDE 600; 310; 30; 20 MG/100ML; MG/100ML; MG/100ML; MG/100ML
9 INJECTION, SOLUTION INTRAVENOUS CONTINUOUS PRN
Status: DISCONTINUED | OUTPATIENT
Start: 2022-07-11 | End: 2022-07-11

## 2022-07-11 RX ORDER — ONDANSETRON 4 MG/1
4 TABLET, FILM COATED ORAL EVERY 8 HOURS PRN
Qty: 30 TABLET | Refills: 1 | Status: SHIPPED | OUTPATIENT
Start: 2022-07-11 | End: 2023-07-11

## 2022-07-11 RX ORDER — PROMETHAZINE HYDROCHLORIDE 25 MG/1
12.5 TABLET ORAL ONCE AS NEEDED
Status: DISCONTINUED | OUTPATIENT
Start: 2022-07-11 | End: 2022-07-11 | Stop reason: HOSPADM

## 2022-07-11 RX ORDER — GLYCOPYRROLATE 0.2 MG/ML
INJECTION INTRAMUSCULAR; INTRAVENOUS AS NEEDED
Status: DISCONTINUED | OUTPATIENT
Start: 2022-07-11 | End: 2022-07-11 | Stop reason: SURG

## 2022-07-11 RX ORDER — PROPOFOL 10 MG/ML
VIAL (ML) INTRAVENOUS AS NEEDED
Status: DISCONTINUED | OUTPATIENT
Start: 2022-07-11 | End: 2022-07-11 | Stop reason: SURG

## 2022-07-11 RX ORDER — EPHEDRINE SULFATE 50 MG/ML
5 INJECTION, SOLUTION INTRAVENOUS ONCE AS NEEDED
Status: DISCONTINUED | OUTPATIENT
Start: 2022-07-11 | End: 2022-07-11 | Stop reason: HOSPADM

## 2022-07-11 RX ORDER — ENALAPRILAT 2.5 MG/2ML
1.25 INJECTION INTRAVENOUS ONCE
Status: DISCONTINUED | OUTPATIENT
Start: 2022-07-11 | End: 2022-07-11 | Stop reason: HOSPADM

## 2022-07-11 RX ORDER — DIPHENHYDRAMINE HYDROCHLORIDE 50 MG/ML
12.5 INJECTION INTRAMUSCULAR; INTRAVENOUS
Status: DISCONTINUED | OUTPATIENT
Start: 2022-07-11 | End: 2022-07-11 | Stop reason: HOSPADM

## 2022-07-11 RX ORDER — ONDANSETRON 2 MG/ML
INJECTION INTRAMUSCULAR; INTRAVENOUS AS NEEDED
Status: DISCONTINUED | OUTPATIENT
Start: 2022-07-11 | End: 2022-07-11 | Stop reason: SURG

## 2022-07-11 RX ORDER — HYDRALAZINE HYDROCHLORIDE 20 MG/ML
5 INJECTION INTRAMUSCULAR; INTRAVENOUS
Status: DISCONTINUED | OUTPATIENT
Start: 2022-07-11 | End: 2022-07-11 | Stop reason: HOSPADM

## 2022-07-11 RX ORDER — FLUMAZENIL 0.1 MG/ML
0.2 INJECTION INTRAVENOUS AS NEEDED
Status: DISCONTINUED | OUTPATIENT
Start: 2022-07-11 | End: 2022-07-11 | Stop reason: HOSPADM

## 2022-07-11 RX ORDER — LABETALOL HYDROCHLORIDE 5 MG/ML
5 INJECTION, SOLUTION INTRAVENOUS
Status: DISCONTINUED | OUTPATIENT
Start: 2022-07-11 | End: 2022-07-11 | Stop reason: HOSPADM

## 2022-07-11 RX ORDER — DIPHENHYDRAMINE HCL 25 MG
25 CAPSULE ORAL
Status: DISCONTINUED | OUTPATIENT
Start: 2022-07-11 | End: 2022-07-11 | Stop reason: HOSPADM

## 2022-07-11 RX ORDER — MIDAZOLAM HYDROCHLORIDE 1 MG/ML
1 INJECTION INTRAMUSCULAR; INTRAVENOUS
Status: DISCONTINUED | OUTPATIENT
Start: 2022-07-11 | End: 2022-07-11 | Stop reason: HOSPADM

## 2022-07-11 RX ORDER — DEXAMETHASONE SODIUM PHOSPHATE 10 MG/ML
INJECTION INTRAMUSCULAR; INTRAVENOUS AS NEEDED
Status: DISCONTINUED | OUTPATIENT
Start: 2022-07-11 | End: 2022-07-11 | Stop reason: SURG

## 2022-07-11 RX ORDER — SODIUM CHLORIDE 0.9 % (FLUSH) 0.9 %
10 SYRINGE (ML) INJECTION EVERY 12 HOURS SCHEDULED
Status: DISCONTINUED | OUTPATIENT
Start: 2022-07-11 | End: 2022-07-11 | Stop reason: HOSPADM

## 2022-07-11 RX ORDER — FENTANYL CITRATE 50 UG/ML
INJECTION, SOLUTION INTRAMUSCULAR; INTRAVENOUS AS NEEDED
Status: DISCONTINUED | OUTPATIENT
Start: 2022-07-11 | End: 2022-07-11 | Stop reason: SURG

## 2022-07-11 RX ORDER — SENNA PLUS 8.6 MG/1
1 TABLET ORAL ONCE
Status: DISCONTINUED | OUTPATIENT
Start: 2022-07-11 | End: 2022-07-11 | Stop reason: HOSPADM

## 2022-07-11 RX ORDER — CEFAZOLIN SODIUM 2 G/100ML
2 INJECTION, SOLUTION INTRAVENOUS ONCE
Status: COMPLETED | OUTPATIENT
Start: 2022-07-11 | End: 2022-07-11

## 2022-07-11 RX ORDER — AMOXICILLIN 250 MG
2 CAPSULE ORAL DAILY PRN
Qty: 30 TABLET | Refills: 1 | Status: SHIPPED | OUTPATIENT
Start: 2022-07-11 | End: 2023-07-11

## 2022-07-11 RX ORDER — ACETAMINOPHEN 325 MG/1
650 TABLET ORAL ONCE
Status: DISCONTINUED | OUTPATIENT
Start: 2022-07-11 | End: 2022-07-11 | Stop reason: HOSPADM

## 2022-07-11 RX ORDER — NEOSTIGMINE METHYLSULFATE 0.5 MG/ML
INJECTION, SOLUTION INTRAVENOUS AS NEEDED
Status: DISCONTINUED | OUTPATIENT
Start: 2022-07-11 | End: 2022-07-11 | Stop reason: SURG

## 2022-07-11 RX ORDER — ENALAPRILAT 2.5 MG/2ML
1.25 INJECTION INTRAVENOUS EVERY 6 HOURS
Status: DISCONTINUED | OUTPATIENT
Start: 2022-07-11 | End: 2022-07-11

## 2022-07-11 RX ORDER — MAGNESIUM HYDROXIDE 1200 MG/15ML
LIQUID ORAL AS NEEDED
Status: DISCONTINUED | OUTPATIENT
Start: 2022-07-11 | End: 2022-07-11 | Stop reason: HOSPADM

## 2022-07-11 RX ORDER — BUPIVACAINE HYDROCHLORIDE AND EPINEPHRINE 5; 5 MG/ML; UG/ML
INJECTION, SOLUTION EPIDURAL; INTRACAUDAL; PERINEURAL AS NEEDED
Status: DISCONTINUED | OUTPATIENT
Start: 2022-07-11 | End: 2022-07-11 | Stop reason: HOSPADM

## 2022-07-11 RX ORDER — LIDOCAINE HYDROCHLORIDE 20 MG/ML
INJECTION, SOLUTION INFILTRATION; PERINEURAL AS NEEDED
Status: DISCONTINUED | OUTPATIENT
Start: 2022-07-11 | End: 2022-07-11 | Stop reason: SURG

## 2022-07-11 RX ORDER — IBUPROFEN 600 MG/1
600 TABLET ORAL ONCE AS NEEDED
Status: DISCONTINUED | OUTPATIENT
Start: 2022-07-11 | End: 2022-07-11 | Stop reason: HOSPADM

## 2022-07-11 RX ORDER — NALOXONE HCL 0.4 MG/ML
0.2 VIAL (ML) INJECTION AS NEEDED
Status: DISCONTINUED | OUTPATIENT
Start: 2022-07-11 | End: 2022-07-11 | Stop reason: HOSPADM

## 2022-07-11 RX ORDER — FAMOTIDINE 10 MG/ML
20 INJECTION, SOLUTION INTRAVENOUS
Status: COMPLETED | OUTPATIENT
Start: 2022-07-11 | End: 2022-07-11

## 2022-07-11 RX ADMIN — HYDRALAZINE HYDROCHLORIDE 5 MG: 20 INJECTION INTRAMUSCULAR; INTRAVENOUS at 09:20

## 2022-07-11 RX ADMIN — LIDOCAINE HYDROCHLORIDE 100 MG: 20 INJECTION, SOLUTION INFILTRATION; PERINEURAL at 07:37

## 2022-07-11 RX ADMIN — GLYCOPYRROLATE 0.4 MG: 0.2 INJECTION INTRAMUSCULAR; INTRAVENOUS at 08:24

## 2022-07-11 RX ADMIN — OXYCODONE HYDROCHLORIDE AND ACETAMINOPHEN 1 TABLET: 5; 325 TABLET ORAL at 09:28

## 2022-07-11 RX ADMIN — HYDRALAZINE HYDROCHLORIDE 5 MG: 20 INJECTION INTRAMUSCULAR; INTRAVENOUS at 08:48

## 2022-07-11 RX ADMIN — HYDRALAZINE HYDROCHLORIDE 5 MG: 20 INJECTION INTRAMUSCULAR; INTRAVENOUS at 09:55

## 2022-07-11 RX ADMIN — GLYCOPYRROLATE 0.3 MG: 0.2 INJECTION INTRAMUSCULAR; INTRAVENOUS at 08:11

## 2022-07-11 RX ADMIN — ROCURONIUM BROMIDE 40 MG: 50 INJECTION INTRAVENOUS at 07:39

## 2022-07-11 RX ADMIN — SODIUM CHLORIDE 9 ML/HR: 9 INJECTION, SOLUTION INTRAVENOUS at 06:05

## 2022-07-11 RX ADMIN — LABETALOL HYDROCHLORIDE 5 MG: 5 INJECTION, SOLUTION INTRAVENOUS at 09:09

## 2022-07-11 RX ADMIN — HYDRALAZINE HYDROCHLORIDE 5 MG: 20 INJECTION INTRAMUSCULAR; INTRAVENOUS at 09:30

## 2022-07-11 RX ADMIN — FAMOTIDINE 20 MG: 10 INJECTION INTRAVENOUS at 06:35

## 2022-07-11 RX ADMIN — FENTANYL CITRATE 100 MCG: 50 INJECTION INTRAMUSCULAR; INTRAVENOUS at 07:37

## 2022-07-11 RX ADMIN — PROPOFOL 150 MG: 10 INJECTION, EMULSION INTRAVENOUS at 07:39

## 2022-07-11 RX ADMIN — ONDANSETRON 4 MG: 2 INJECTION INTRAMUSCULAR; INTRAVENOUS at 08:21

## 2022-07-11 RX ADMIN — LABETALOL HYDROCHLORIDE 5 MG: 5 INJECTION, SOLUTION INTRAVENOUS at 09:45

## 2022-07-11 RX ADMIN — LABETALOL HYDROCHLORIDE 5 MG: 5 INJECTION, SOLUTION INTRAVENOUS at 09:25

## 2022-07-11 RX ADMIN — CEFAZOLIN SODIUM 2 G: 2 INJECTION, SOLUTION INTRAVENOUS at 07:23

## 2022-07-11 RX ADMIN — HYDRALAZINE HYDROCHLORIDE 5 MG: 20 INJECTION INTRAMUSCULAR; INTRAVENOUS at 08:58

## 2022-07-11 RX ADMIN — DEXAMETHASONE SODIUM PHOSPHATE 8 MG: 10 INJECTION INTRAMUSCULAR; INTRAVENOUS at 07:50

## 2022-07-11 RX ADMIN — NEOSTIGMINE METHYLSULFATE 2.5 MG: 0.5 INJECTION INTRAVENOUS at 08:24

## 2022-07-11 NOTE — PERIOPERATIVE NURSING NOTE
Patient has history of hypertension (18yr old), did not take his blood pressure medicine this AM. Blood pressure treated in PACU per [facility] orders. Diastolic BP still elevated. Spoke to Dr Strickland, Ok to send patient home with diastolicslightly elevated, patient to take hisBP meds when he gets home.

## 2022-07-11 NOTE — H&P
Chief Complaint   Patient presents with   • NEW PT- LOWER ABDONMINAL PAIN            Subjective []Expand by Default         Saravanan Hutchison is a 51 y.o. male who is referred by Dr. Vallejo for evaluation of abdominal pain with peritoneal dialysis.  The patient has end-stage renal disease and has been on peritoneal dialysis for approximately 1 year.  The catheter was placed in [place].  He never have any problem with the catheter until last month when he started having pain when catheter is draining peritoneal fluid.  Initially the pain was mild and associated only with draining with .  For the past week the pain has been getting persistently worse, the pain is located at the suprapubic area and radiates to bilateral testicles and rectum.  The pain is sharp in nature and exacerbated by peritoneal fluid drainage.  He does not have any pain with peritoneal fluid and surgeon.  Pains get better with stopping peritoneal drainage.  He has not have any problem draining the catheter otherwise.  He denies any fevers or chills.  He did not notice any change in the color of the fluid.  Patient reports constipation of 1 hard bowel movement every 2 days.  He takes lactulose for chronic constipation.  He reports no fevers but complains of chills.  He has been able to tolerate diet without any nausea or vomiting.  He denies any bulging on the abdominal wall.           Past Medical History:   Diagnosis Date   • CHF (congestive heart failure) (HCC) May 2021   •  End-stage renal disease on peritoneal dialysis 2021   • Coronary artery disease May 2021   • Hypertension Sept. 1990      Surgical History         Past Surgical History:   Procedure Laterality Date   • APPENDECTOMY   1994      -Peritoneal dialysis catheter placement 2021        Current Outpatient Medications:   •  carvedilol (COREG) 25 MG tablet, Take 25 mg by mouth 2 (Two) Times a Day., Disp: , Rfl:   •  cloNIDine HCl ER 0.1 MG tablet sustained-release 12 hour tablet,  Take 0.1 mg by mouth Daily., Disp: , Rfl:   •  gentamicin (GARAMYCIN) 0.1 % cream, APPLY TO EXIT SITE AS DIRECTED, Disp: , Rfl:   •  hydrALAZINE (APRESOLINE) 100 MG tablet, Take 100 mg by mouth 3 (Three) Times a Day., Disp: , Rfl:   •  NIFEdipine XL (PROCARDIA XL) 30 MG 24 hr tablet, Take 30 mg by mouth Daily., Disp: , Rfl:   •  calcitriol (ROCALTROL) 0.25 MCG capsule, Take 0.25 mcg by mouth Daily., Disp: , Rfl:   •  polyethylene glycol (MIRALAX) 17 g packet, Take 17 g by mouth 2 (Two) Times a Day As Needed (constipation)., Disp: 60 each, Rfl: 2  •  vitamin D (ERGOCALCIFEROL) 1.25 MG (07296 UT) capsule capsule, Take 50,000 Units by mouth 1 (One) Time Per Week., Disp: , Rfl:      No Known Allergies           Family History   Problem Relation Age of Onset   • Cancer Mother     • Hypertension Mother     • Heart disease Maternal Grandfather           Social History   Social History            Socioeconomic History   • Marital status: Single   Tobacco Use   • Smoking status: Former Smoker       Packs/day: 1.00       Years: 20.00       Pack years: 20.00       Types: Cigarettes       Start date: 3/15/1987       Quit date: 2022       Years since quittin.2   • Smokeless tobacco: Never Used   Vaping Use   • Vaping Use: Never used   Substance and Sexual Activity   • Alcohol use: Never   • Drug use: Never   • Sexual activity: Not Currently       Partners: Female         REVIEW OF SYSTEMS    Review of Systems   Constitutional: Positive for chills. Negative for activity change, appetite change, diaphoresis and fever.   HENT: Negative for congestion, postnasal drip and rhinorrhea.    Eyes: Negative for discharge and itching.   Respiratory: Negative for apnea, cough, choking, chest tightness and shortness of breath.    Cardiovascular: Negative for chest pain, palpitations and leg swelling.   Gastrointestinal: Positive for abdominal pain, constipation and rectal pain. Negative for abdominal distention, anal bleeding, blood  "in stool, diarrhea, nausea and vomiting.   Endocrine: Positive for cold intolerance. Negative for heat intolerance.   Genitourinary: Positive for dysuria. Negative for difficulty urinating.   Musculoskeletal: Negative for arthralgias and back pain.   Skin: Negative for color change and pallor.   Allergic/Immunologic: Negative for environmental allergies and food allergies.   Neurological: Negative for dizziness and numbness.   Hematological: Negative for adenopathy. Does not bruise/bleed easily.   Psychiatric/Behavioral: Negative for agitation and decreased concentration.         Physical Examination  Ht 172.7 cm (68\")   Wt 82.6 kg (182 lb 3.2 oz)   BMI 27.70 kg/m²   Body mass index is 27.7 kg/m².  Physical Exam  Constitutional:       Appearance: He is normal weight.   HENT:      Head: Normocephalic and atraumatic.      Nose: Nose normal.      Mouth/Throat:      Pharynx: Oropharynx is clear.   Eyes:      General: No scleral icterus.     Conjunctiva/sclera: Conjunctivae normal.   Cardiovascular:      Rate and Rhythm: Normal rate and regular rhythm.   Pulmonary:      Effort: Pulmonary effort is normal.      Breath sounds: Normal breath sounds.   Abdominal:      General: Abdomen is flat. Bowel sounds are normal. There is no distension.      Palpations: Abdomen is soft. There is no mass.      Tenderness: There is abdominal tenderness.      Hernia: No hernia is present.      Comments: Mild tenderness over the suprapubic area.  Tenderness over bilateral inguinal canals, no evidence of hernia, peritoneal dialysis catheter in place.  There is excellent flow of the catheter and there is no evidence of obstruction.  There is slightly cloudy fluid from the peritoneal dialysate.   Musculoskeletal:      Cervical back: Normal range of motion and neck supple.   Neurological:      Mental Status: He is alert.       CT scan abdomen pelvis showing no intra-abdominal abnormality, moderate constipation, peritoneal dialysis catheter in " place     Assessment:   Saravanan Hutchison is a 51 y.o. male with ESRD on peritoneal dialysis having a lot of pain bilateral testicles and groins with catheter drainage.    He has been performing manual exchanges without any improvement of his symptoms.  CT scan abdomen pelvis did not show any abnormality with the peritoneal dialysis catheter.  Discussed with him about further step.  I offer him diagnostic laparoscopy with peritoneal dialysis catheter revision.  Risk and benefits of procedure including bleeding, infection, intra-abdominal injuries discussed in detail with the patient that verbalized understanding and agree with the plan        Plan:     -Diagnostic laparoscopy with peritoneal dialysis catheter revision    Danie Morocho MD, FACS  General, Minimally Invasive and Endoscopic Surgery  [facility]    [address], [address]  [place], [place], [address]  P: [phone]  F: [phone]

## 2022-07-11 NOTE — ANESTHESIA PROCEDURE NOTES
Airway  Urgency: elective    Date/Time: 7/11/2022 7:43 AM  Airway not difficult    General Information and Staff    Patient location during procedure: OR  Anesthesiologist: Cyo Strickland MD  CRNA/CAA: Freddy England CRNA    Indications and Patient Condition  Indications for airway management: airway protection    Preoxygenated: yes  Mask difficulty assessment: 1 - vent by mask    Final Airway Details  Final airway type: endotracheal airway      Successful airway: ETT  Cuffed: yes   Successful intubation technique: direct laryngoscopy  Endotracheal tube insertion site: oral  Blade: Quintero  Blade size: 2  ETT size (mm): 8.0  Cormack-Lehane Classification: grade I - full view of glottis  Placement verified by: chest auscultation and capnometry   Measured from: lips  ETT/EBT  to lips (cm): 22  Number of attempts at approach: 1  Assessment: lips, teeth, and gum same as pre-op and atraumatic intubation    Additional Comments  Pre 02 100%, SIVI, DL x1, atraumatic intubation, BLBS, Positive ETC02.

## 2022-07-11 NOTE — OP NOTE
Date of procedure: 7/11/2022    Primary Surgeon: Dr. Morocho    Assistant: Benita Bernal CSA that was helped with holding camera and providing exposure, closing the wound for the success of the case    Preoperative diagnosis: Pain with peritoneal dialysis    Postoperative diagnosis: Same    Procedure performed: Diagnostic laparoscopy with peritoneal dialysis catheter pexy to the anterior abdominal wall    Anesthesia: General    EBL: Minimal    Complications: None    Findings: Redundant peritoneal dialysis catheter located in the right lower quadrant     Condition the patient: Stable to recovery    Indications: 52-year-old male with pain with peritoneal dialysis that is here today for diagnostic laparoscopy and possible revision.  Risk and benefits of procedure including bleeding, infection, intra-abdominal injuries discussed in detail with the patient that verbalized understanding and agreed with the plan    Description: Patient was taken to operative room and was placed in the OR table in the supine position.  Preoperative antibiotics were given and SCDs were placed.  Patient underwent general endotracheal anesthesia.  Patient abdomen was then prepped and draped in usual sterile fashion.  Timeout was performed the patient was correctly identified.  With half percent Marcaine with epinephrine the left upper abdominal skin was infiltrated.  Small 5 mm incision was performed.  With Optiview technique and insertion of a 5 mm trocar the abdomen was accessed.  Pneumoperitoneum was insufflated.  Upon exploration of the abdominal cavity there was no evidence of injury from trocar placement.  The peritoneal dialysis catheter was located over to the right lower quadrant.  There were no adhesions in the pelvis.  There was a slightly redundant sigmoid colon with moderate amount of stool.  There were no other pathology.  I placed another 5 mm trocar in the left lower quadrant under direct laparoscopic vision.  I then repositioned  the peritoneal dialysis catheter in the supravesical area.  The catheter was then pexied to the anterior abdominal wall with 2-0 Vicryl suture and Endo Close technique.  The catheter was now laying in the correct position.  I proceeded to desufflate the abdomen and 500 cc of heparinized saline were introduced.  200 cc of heparinized saline drain without any resistance.  I then reinsufflated the pneumoperitoneum and expiration of the abdominal cavity showed no evidence of injury from the procedure.  All the trochars then were removed under direct laparoscopic vision while desufflated the pneumoperitoneum.  The peritoneal dialysis catheter was flushed with heparinized saline and there was no resistance to the flow.  Incisions were closed with 3-0 Vicryl and 4-0 Monocryl.  Patient tolerated procedure well was sent to recovery area in stable condition    Danie Morocho MD, FACS  General, Minimally Invasive and Endoscopic Surgery  [facility], [place], operated by [facility]    [address] [place] [address], [address]  [place], [place], [address]  P: [phone]  F: [phone]

## 2022-07-11 NOTE — ANESTHESIA PREPROCEDURE EVALUATION
Anesthesia Evaluation     Patient summary reviewed   NPO Solid Status: > 8 hours  NPO Liquid Status: > 4 hours           Airway   Mallampati: II  Neck ROM: full  No difficulty expected  Dental      Pulmonary    Cardiovascular     Rhythm: regular    (+) hypertension, CAD,       Neuro/Psych  GI/Hepatic/Renal/Endo    (+)   renal disease CRI and dialysis,     Musculoskeletal     Abdominal    Substance History      OB/GYN          Other                        Anesthesia Plan    ASA 3     general       Anesthetic plan, risks, benefits, and alternatives have been provided, discussed and informed consent has been obtained with: patient.        CODE STATUS:

## 2022-07-11 NOTE — ANESTHESIA POSTPROCEDURE EVALUATION
"Patient: Radha Hutchison    Procedure Summary     Date: 07/11/22 Room / Location: [facility] OR [address] [place] [address] MAIN OR    Anesthesia Start: 0731 Anesthesia Stop: 0841    Procedure: DIAGNOSTIC LAPAROSCOPY,  peritoneal dialysis pexy (N/A Abdomen) Diagnosis:       Peritoneal dialysis catheter dysfunction (HCC)      (Peritoneal dialysis catheter dysfunction (HCC) [T85.611A])    Surgeons: Danie Morocho MD Provider: Coy Strickland MD    Anesthesia Type: general ASA Status: 3          Anesthesia Type: general    Vitals  Vitals Value Taken Time   /109 07/11/22 1011   Temp 36.1 °C (97 °F) 07/11/22 0845   Pulse 71 07/11/22 1019   Resp 16 07/11/22 0930   SpO2 95 % 07/11/22 1019   Vitals shown include unvalidated device data.        Post Anesthesia Care and Evaluation    Patient location during evaluation: PHASE II  Patient participation: complete - patient participated  Level of consciousness: awake  Pain management: adequate    Airway patency: patent  Anesthetic complications: No anesthetic complications    Cardiovascular status: acceptable  Respiratory status: acceptable  Hydration status: acceptable    Comments: BP (!) 150/103 (BP Location: Right arm, Patient Position: Lying)   Pulse 70   Temp 36.1 °C (97 °F) (Oral)   Resp 16   Ht 175.3 cm (69\")   Wt 78.8 kg (173 lb 12.8 oz)   SpO2 95%   BMI 25.67 kg/m²       "

## 2022-08-16 ENCOUNTER — OFFICE VISIT (OUTPATIENT)
Dept: CARDIOLOGY | Facility: CLINIC | Age: 52
End: 2022-08-16

## 2022-08-16 VITALS
SYSTOLIC BLOOD PRESSURE: 131 MMHG | DIASTOLIC BLOOD PRESSURE: 85 MMHG | HEART RATE: 70 BPM | WEIGHT: 174 LBS | BODY MASS INDEX: 25.77 KG/M2 | HEIGHT: 69 IN

## 2022-08-16 DIAGNOSIS — R06.09 EXERTIONAL DYSPNEA: ICD-10-CM

## 2022-08-16 DIAGNOSIS — I11.0 HYPERTENSIVE LEFT VENTRICULAR HYPERTROPHY WITH HEART FAILURE: Primary | ICD-10-CM

## 2022-08-16 DIAGNOSIS — N18.6 ESRD (END STAGE RENAL DISEASE) ON DIALYSIS: ICD-10-CM

## 2022-08-16 DIAGNOSIS — Z99.2 ESRD (END STAGE RENAL DISEASE) ON DIALYSIS: ICD-10-CM

## 2022-08-16 PROCEDURE — 99203 OFFICE O/P NEW LOW 30 MIN: CPT | Performed by: INTERNAL MEDICINE

## 2022-09-01 ENCOUNTER — PREP FOR SURGERY (OUTPATIENT)
Dept: OTHER | Facility: HOSPITAL | Age: 52
End: 2022-09-01

## 2022-09-01 ENCOUNTER — TRANSCRIBE ORDERS (OUTPATIENT)
Dept: VASCULAR SURGERY | Facility: HOSPITAL | Age: 52
End: 2022-09-01

## 2022-09-01 DIAGNOSIS — Z99.2 END STAGE RENAL FAILURE ON DIALYSIS: Primary | ICD-10-CM

## 2022-09-01 DIAGNOSIS — N18.6 END STAGE RENAL DISEASE: Primary | ICD-10-CM

## 2022-09-01 DIAGNOSIS — N18.6 END STAGE RENAL FAILURE ON DIALYSIS: Primary | ICD-10-CM

## 2022-09-01 RX ORDER — SODIUM CHLORIDE 0.9 % (FLUSH) 0.9 %
10 SYRINGE (ML) INJECTION AS NEEDED
Status: CANCELLED | OUTPATIENT
Start: 2022-09-01

## 2022-09-01 RX ORDER — SODIUM CHLORIDE 0.9 % (FLUSH) 0.9 %
10 SYRINGE (ML) INJECTION EVERY 12 HOURS SCHEDULED
Status: CANCELLED | OUTPATIENT
Start: 2022-09-01

## 2022-09-02 ENCOUNTER — APPOINTMENT (OUTPATIENT)
Dept: GENERAL RADIOLOGY | Facility: HOSPITAL | Age: 52
End: 2022-09-02

## 2022-09-02 ENCOUNTER — HOSPITAL ENCOUNTER (OUTPATIENT)
Facility: HOSPITAL | Age: 52
Setting detail: HOSPITAL OUTPATIENT SURGERY
Discharge: HOME | End: 2022-09-02
Attending: SURGERY | Admitting: SURGERY

## 2022-09-02 ENCOUNTER — ANESTHESIA EVENT (OUTPATIENT)
Dept: PERIOP | Facility: HOSPITAL | Age: 52
End: 2022-09-02

## 2022-09-02 ENCOUNTER — ANESTHESIA (OUTPATIENT)
Dept: PERIOP | Facility: HOSPITAL | Age: 52
End: 2022-09-02

## 2022-09-02 VITALS
WEIGHT: 179.68 LBS | OXYGEN SATURATION: 96 % | TEMPERATURE: 98.9 F | RESPIRATION RATE: 18 BRPM | BODY MASS INDEX: 25.72 KG/M2 | SYSTOLIC BLOOD PRESSURE: 149 MMHG | HEIGHT: 70 IN | HEART RATE: 64 BPM | DIASTOLIC BLOOD PRESSURE: 101 MMHG

## 2022-09-02 DIAGNOSIS — N18.6 END STAGE RENAL FAILURE ON DIALYSIS: ICD-10-CM

## 2022-09-02 DIAGNOSIS — Z99.2 END STAGE RENAL FAILURE ON DIALYSIS: ICD-10-CM

## 2022-09-02 LAB
ANION GAP SERPL CALCULATED.3IONS-SCNC: 12.7 MMOL/L (ref 5–15)
BASOPHILS # BLD AUTO: 0.07 10*3/MM3 (ref 0–0.2)
BASOPHILS NFR BLD AUTO: 0.8 % (ref 0–1.5)
BUN SERPL-MCNC: 36 MG/DL (ref 6–20)
BUN/CREAT SERPL: 14.1 (ref 7–25)
CALCIUM SPEC-SCNC: 9 MG/DL (ref 8.6–10.5)
CHLORIDE SERPL-SCNC: 102 MMOL/L (ref 98–107)
CO2 SERPL-SCNC: 21.3 MMOL/L (ref 22–29)
CREAT SERPL-MCNC: 2.55 MG/DL (ref 0.76–1.27)
DEPRECATED RDW RBC AUTO: 47.7 FL (ref 37–54)
EGFRCR SERPLBLD CKD-EPI 2021: 29.8 ML/MIN/1.73
EOSINOPHIL # BLD AUTO: 0.56 10*3/MM3 (ref 0–0.4)
EOSINOPHIL NFR BLD AUTO: 6.7 % (ref 0.3–6.2)
ERYTHROCYTE [DISTWIDTH] IN BLOOD BY AUTOMATED COUNT: 15.9 % (ref 12.3–15.4)
GLUCOSE SERPL-MCNC: 104 MG/DL (ref 65–99)
HCT VFR BLD AUTO: 44 % (ref 37.5–51)
HGB BLD-MCNC: 15 G/DL (ref 13–17.7)
IMM GRANULOCYTES # BLD AUTO: 0.02 10*3/MM3 (ref 0–0.05)
IMM GRANULOCYTES NFR BLD AUTO: 0.2 % (ref 0–0.5)
LYMPHOCYTES # BLD AUTO: 1.79 10*3/MM3 (ref 0.7–3.1)
LYMPHOCYTES NFR BLD AUTO: 21.4 % (ref 19.6–45.3)
MCH RBC QN AUTO: 28.2 PG (ref 26.6–33)
MCHC RBC AUTO-ENTMCNC: 34.1 G/DL (ref 31.5–35.7)
MCV RBC AUTO: 82.7 FL (ref 79–97)
MONOCYTES # BLD AUTO: 0.84 10*3/MM3 (ref 0.1–0.9)
MONOCYTES NFR BLD AUTO: 10 % (ref 5–12)
NEUTROPHILS NFR BLD AUTO: 5.1 10*3/MM3 (ref 1.7–7)
NEUTROPHILS NFR BLD AUTO: 60.9 % (ref 42.7–76)
NRBC BLD AUTO-RTO: 0 /100 WBC (ref 0–0.2)
PLATELET # BLD AUTO: 258 10*3/MM3 (ref 140–450)
PMV BLD AUTO: 9.6 FL (ref 6–12)
POTASSIUM SERPL-SCNC: 4.2 MMOL/L (ref 3.5–5.2)
RBC # BLD AUTO: 5.32 10*6/MM3 (ref 4.14–5.8)
SODIUM SERPL-SCNC: 136 MMOL/L (ref 136–145)
WBC NRBC COR # BLD: 8.38 10*3/MM3 (ref 3.4–10.8)

## 2022-09-02 PROCEDURE — 25010000002 CEFAZOLIN PER 500 MG: Performed by: NURSE ANESTHETIST, CERTIFIED REGISTERED

## 2022-09-02 PROCEDURE — 71045 X-RAY EXAM CHEST 1 VIEW: CPT

## 2022-09-02 PROCEDURE — 25010000002 MIDAZOLAM PER 1 MG: Performed by: ANESTHESIOLOGY

## 2022-09-02 PROCEDURE — 0 LIDOCAINE 1 % SOLUTION 10 ML VIAL: Performed by: SURGERY

## 2022-09-02 PROCEDURE — S0260 H&P FOR SURGERY: HCPCS | Performed by: SURGERY

## 2022-09-02 PROCEDURE — 85025 COMPLETE CBC W/AUTO DIFF WBC: CPT | Performed by: NURSE PRACTITIONER

## 2022-09-02 PROCEDURE — 25010000002 HEPARIN (PORCINE) PER 1000 UNITS: Performed by: SURGERY

## 2022-09-02 PROCEDURE — 77001 FLUOROGUIDE FOR VEIN DEVICE: CPT | Performed by: SURGERY

## 2022-09-02 PROCEDURE — C1750 CATH, HEMODIALYSIS,LONG-TERM: HCPCS | Performed by: SURGERY

## 2022-09-02 PROCEDURE — 25010000002 DEXAMETHASONE PER 1 MG: Performed by: NURSE ANESTHETIST, CERTIFIED REGISTERED

## 2022-09-02 PROCEDURE — 80048 BASIC METABOLIC PNL TOTAL CA: CPT | Performed by: NURSE PRACTITIONER

## 2022-09-02 PROCEDURE — 25010000002 FENTANYL CITRATE (PF) 50 MCG/ML SOLUTION: Performed by: NURSE ANESTHETIST, CERTIFIED REGISTERED

## 2022-09-02 PROCEDURE — 76000 FLUOROSCOPY <1 HR PHYS/QHP: CPT

## 2022-09-02 PROCEDURE — 25010000002 PROPOFOL 10 MG/ML EMULSION: Performed by: NURSE ANESTHETIST, CERTIFIED REGISTERED

## 2022-09-02 PROCEDURE — 77001 FLUOROGUIDE FOR VEIN DEVICE: CPT

## 2022-09-02 PROCEDURE — 25010000002 KETOROLAC TROMETHAMINE PER 15 MG: Performed by: NURSE ANESTHETIST, CERTIFIED REGISTERED

## 2022-09-02 PROCEDURE — 25010000002 FENTANYL CITRATE (PF) 50 MCG/ML SOLUTION: Performed by: ANESTHESIOLOGY

## 2022-09-02 PROCEDURE — 25010000002 ONDANSETRON PER 1 MG: Performed by: NURSE ANESTHETIST, CERTIFIED REGISTERED

## 2022-09-02 PROCEDURE — 36558 INSERT TUNNELED CV CATH: CPT | Performed by: SURGERY

## 2022-09-02 RX ORDER — FENTANYL CITRATE 50 UG/ML
50 INJECTION, SOLUTION INTRAMUSCULAR; INTRAVENOUS
Status: DISCONTINUED | OUTPATIENT
Start: 2022-09-02 | End: 2022-09-02 | Stop reason: HOSPADM

## 2022-09-02 RX ORDER — HEPARIN SODIUM 1000 [USP'U]/ML
INJECTION, SOLUTION INTRAVENOUS; SUBCUTANEOUS AS NEEDED
Status: DISCONTINUED | OUTPATIENT
Start: 2022-09-02 | End: 2022-09-02 | Stop reason: HOSPADM

## 2022-09-02 RX ORDER — LIDOCAINE HYDROCHLORIDE 20 MG/ML
INJECTION, SOLUTION EPIDURAL; INFILTRATION; INTRACAUDAL; PERINEURAL AS NEEDED
Status: DISCONTINUED | OUTPATIENT
Start: 2022-09-02 | End: 2022-09-02 | Stop reason: SURG

## 2022-09-02 RX ORDER — ONDANSETRON 2 MG/ML
4 INJECTION INTRAMUSCULAR; INTRAVENOUS ONCE AS NEEDED
Status: DISCONTINUED | OUTPATIENT
Start: 2022-09-02 | End: 2022-09-02 | Stop reason: HOSPADM

## 2022-09-02 RX ORDER — FENTANYL CITRATE 50 UG/ML
25 INJECTION, SOLUTION INTRAMUSCULAR; INTRAVENOUS
Status: DISCONTINUED | OUTPATIENT
Start: 2022-09-02 | End: 2022-09-02 | Stop reason: HOSPADM

## 2022-09-02 RX ORDER — DEXAMETHASONE SODIUM PHOSPHATE 4 MG/ML
INJECTION, SOLUTION INTRA-ARTICULAR; INTRALESIONAL; INTRAMUSCULAR; INTRAVENOUS; SOFT TISSUE AS NEEDED
Status: DISCONTINUED | OUTPATIENT
Start: 2022-09-02 | End: 2022-09-02 | Stop reason: SURG

## 2022-09-02 RX ORDER — CEFAZOLIN SODIUM 1 G/3ML
INJECTION, POWDER, FOR SOLUTION INTRAMUSCULAR; INTRAVENOUS AS NEEDED
Status: DISCONTINUED | OUTPATIENT
Start: 2022-09-02 | End: 2022-09-02 | Stop reason: SURG

## 2022-09-02 RX ORDER — PROMETHAZINE HYDROCHLORIDE 12.5 MG/1
25 TABLET ORAL ONCE AS NEEDED
Status: DISCONTINUED | OUTPATIENT
Start: 2022-09-02 | End: 2022-09-02 | Stop reason: HOSPADM

## 2022-09-02 RX ORDER — ACETAMINOPHEN 500 MG
1000 TABLET ORAL ONCE
Status: COMPLETED | OUTPATIENT
Start: 2022-09-02 | End: 2022-09-02

## 2022-09-02 RX ORDER — KETOROLAC TROMETHAMINE 30 MG/ML
INJECTION, SOLUTION INTRAMUSCULAR; INTRAVENOUS AS NEEDED
Status: DISCONTINUED | OUTPATIENT
Start: 2022-09-02 | End: 2022-09-02 | Stop reason: SURG

## 2022-09-02 RX ORDER — SODIUM CHLORIDE 0.9 % (FLUSH) 0.9 %
10 SYRINGE (ML) INJECTION AS NEEDED
Status: DISCONTINUED | OUTPATIENT
Start: 2022-09-02 | End: 2022-09-02 | Stop reason: HOSPADM

## 2022-09-02 RX ORDER — PROMETHAZINE HYDROCHLORIDE 25 MG/1
25 SUPPOSITORY RECTAL ONCE AS NEEDED
Status: DISCONTINUED | OUTPATIENT
Start: 2022-09-02 | End: 2022-09-02 | Stop reason: HOSPADM

## 2022-09-02 RX ORDER — MIDAZOLAM HYDROCHLORIDE 1 MG/ML
2 INJECTION INTRAMUSCULAR; INTRAVENOUS ONCE
Status: COMPLETED | OUTPATIENT
Start: 2022-09-02 | End: 2022-09-02

## 2022-09-02 RX ORDER — FENTANYL CITRATE 50 UG/ML
INJECTION, SOLUTION INTRAMUSCULAR; INTRAVENOUS AS NEEDED
Status: DISCONTINUED | OUTPATIENT
Start: 2022-09-02 | End: 2022-09-02 | Stop reason: SURG

## 2022-09-02 RX ORDER — SODIUM CHLORIDE 9 MG/ML
9 INJECTION, SOLUTION INTRAVENOUS CONTINUOUS PRN
Status: DISCONTINUED | OUTPATIENT
Start: 2022-09-02 | End: 2022-09-02 | Stop reason: HOSPADM

## 2022-09-02 RX ORDER — ACETAMINOPHEN 325 MG/1
650 TABLET ORAL EVERY 4 HOURS PRN
Status: CANCELLED | OUTPATIENT
Start: 2022-09-02

## 2022-09-02 RX ORDER — PROPOFOL 10 MG/ML
VIAL (ML) INTRAVENOUS AS NEEDED
Status: DISCONTINUED | OUTPATIENT
Start: 2022-09-02 | End: 2022-09-02 | Stop reason: SURG

## 2022-09-02 RX ORDER — SODIUM CHLORIDE 0.9 % (FLUSH) 0.9 %
10 SYRINGE (ML) INJECTION EVERY 12 HOURS SCHEDULED
Status: DISCONTINUED | OUTPATIENT
Start: 2022-09-02 | End: 2022-09-02 | Stop reason: HOSPADM

## 2022-09-02 RX ADMIN — DEXAMETHASONE SODIUM PHOSPHATE 4 MG: 4 INJECTION, SOLUTION INTRA-ARTICULAR; INTRALESIONAL; INTRAMUSCULAR; INTRAVENOUS; SOFT TISSUE at 07:51

## 2022-09-02 RX ADMIN — MIDAZOLAM HYDROCHLORIDE 2 MG: 1 INJECTION, SOLUTION INTRAMUSCULAR; INTRAVENOUS at 07:24

## 2022-09-02 RX ADMIN — FENTANYL CITRATE 100 MCG: 50 INJECTION, SOLUTION INTRAMUSCULAR; INTRAVENOUS at 08:15

## 2022-09-02 RX ADMIN — KETOROLAC TROMETHAMINE 30 MG: 30 INJECTION, SOLUTION INTRAMUSCULAR; INTRAVENOUS at 08:31

## 2022-09-02 RX ADMIN — ACETAMINOPHEN 1000 MG: 500 TABLET ORAL at 07:23

## 2022-09-02 RX ADMIN — ONDANSETRON 4 MG: 2 INJECTION INTRAMUSCULAR; INTRAVENOUS at 07:51

## 2022-09-02 RX ADMIN — PROPOFOL 200 MG: 10 INJECTION, EMULSION INTRAVENOUS at 07:38

## 2022-09-02 RX ADMIN — SODIUM CHLORIDE 9 ML/HR: 9 INJECTION, SOLUTION INTRAVENOUS at 07:25

## 2022-09-02 RX ADMIN — FENTANYL CITRATE 50 MCG: 50 INJECTION, SOLUTION INTRAMUSCULAR; INTRAVENOUS at 09:15

## 2022-09-02 RX ADMIN — LIDOCAINE HYDROCHLORIDE 100 MG: 20 INJECTION, SOLUTION EPIDURAL; INFILTRATION; INTRACAUDAL; PERINEURAL at 07:38

## 2022-09-02 RX ADMIN — CEFAZOLIN SODIUM 1 G: 1 INJECTION, POWDER, FOR SOLUTION INTRAMUSCULAR; INTRAVENOUS at 07:42

## 2022-09-02 NOTE — H&P
[facility]   HISTORY AND PHYSICAL    Patient Name: Radha Hutchison  : 1970  MRN: [id number]  Primary Care Physician:  Provider, No Known  Date of admission: 2022    Subjective   Subjective     Chief Complaint: End-stage renal disease with need for hemodialysis catheter    History of Present Illness: Mr. Hodge is a 52-year-old  male referred by Dr. Washington who has been on peritoneal dialysis for end-stage renal disease secondary to hypertension for the past year.  He has had multiple peritoneal dialysis catheter replacements over that time for significant abdominal pain and perineal pain.  He denies any fevers or chills.  However due to the consistent nature of the pain he now is requesting a switch to hemodialysis.  Thus he presents today for placement of a tunneled dialysis catheter in the OR.    Review of Systems: As per history of present illness otherwise 10 point review of systems negative.    Personal History     Past Medical History:   Diagnosis Date   • CHF (congestive heart failure) ([facility]) May 2021   • Chronic kidney disease     Stage 5   • Coronary artery disease May 2021   • History of COVID-19 2020   • Hypertension 1990   • Peritoneal dialysis catheter in place ([facility])     STARTED 2021       Past Surgical History:   Procedure Laterality Date   • APPENDECTOMY     • INSERTION PERITONEAL DIALYSIS CATHETER     • INSERTION PERITONEAL DIALYSIS CATHETER N/A 2022    Procedure: DIAGNOSTIC LAPAROSCOPY,  peritoneal dialysis pexy;  Surgeon: Danie Morocho MD;  Location: [facility];  Service: General;  Laterality: N/A;       Family History: family history includes Cancer in his mother; Heart disease in his maternal grandfather; Hypertension in his mother. Otherwise pertinent FHx was reviewed and not pertinent to current issue.    Social History:  reports that he quit smoking about 6 months ago. His smoking use included cigarettes. He started smoking about 35  years ago. He has a 20.00 pack-year smoking history. He has never used smokeless tobacco. He reports that he does not drink alcohol and does not use drugs.    Home Medications:  NIFEdipine XL, aspirin, calcitriol, carvedilol, cloNIDine, eplerenone, furosemide, gentamicin, hydrALAZINE, ondansetron, polyethylene glycol, sennosides-docusate, and vitamin D    Allergies:  Allergies   Allergen Reactions   • Coconut Anaphylaxis   • Morphine Anaphylaxis and Swelling       Objective    Objective     Vitals:   Temp:  [98 °F (36.7 °C)] 98 °F (36.7 °C)  Heart Rate:  [65] 65  Resp:  [18] 18  BP: (152)/(98) 152/98    Physical Exam  Physical Exam                         General: Patient is a well-developed, appropriately dressed 52 y.o. male in no acute distress, who appears stated age              HEENT: Head is normocephalic and atraumatic, pupils equal round react to light, extraocular muscles intact.  Sclera non-icteric              Lungs: Clear to auscultation bilaterally                      Heart: RRR, no murmurs rubs or gallops              Abdomen: Soft , nontender and non-distended.  Positive bowel sounds in all quadrants, no hepato-splenomegaly or other masses palpated.  Peritoneal dialysis catheter in place without any signs of infection.              Extremities: No clubbing, cyanosis, or edema present.  Bilateral radial pulses palpable.              Neuro: Cranial nerves II through XII grossly intact.  Patient able to move all extremities spontaneously and purposefully.  No focal or lateralizing neurologic deficits present currently.                  Result Review    Result Review:  I have personally reviewed the results from the time of this admission to 9/2/2022 07:26 EDT and agree with these findings:  [x]  Laboratory list / accordion  []  Microbiology  []  Radiology  []  EKG/Telemetry   []  Cardiology/Vascular   []  Pathology  [x]  Old records  []  Other:  Most notable findings include: Hemoglobin 15, potassium  4.2, creatinine of 2.55, GFR 29.8.      Assessment & Plan   Assessment / Plan     Brief Patient assessment and plan:  Radha Hutchison is a 52 y.o. male who has had pain with peritoneal dialysis and multiple catheter replacement over the past year.  2.  He presents today for placement of a tunneled dialysis catheter under ultrasound and fluoroscopic guidance.  3.  Risks including but not limited to bleeding, infection, need for multiple procedures, pneumothorax and death as well as benefits and indications regarding tunneled dialysis catheter placement were discussed with patient  who voiced understanding and willingness to proceed.  All questions were answered.  4.  We will proceed with tunneled dialysis catheter placement under ultrasound and fluoroscopic guidance.  5.  Past medical history significant for CHF, hypertension and coronary artery disease.    Active Hospital Problems:  Active Hospital Problems    Diagnosis    • **End stage renal failure on dialysis (HCC)        DVT prophylaxis:  No DVT prophylaxis order currently exists.    CODE STATUS: Full code perioperatively.       Mikey Perez MD

## 2022-09-02 NOTE — ANESTHESIA PREPROCEDURE EVALUATION
Anesthesia Evaluation     Patient summary reviewed and Nursing notes reviewed   no history of anesthetic complications:  NPO Solid Status: > 8 hours  NPO Liquid Status: > 2 hours           Airway   Mallampati: II  TM distance: >3 FB  Neck ROM: full  No difficulty expected  Dental      Pulmonary - negative pulmonary ROS and normal exam    breath sounds clear to auscultation  Cardiovascular - normal exam  Exercise tolerance: good (4-7 METS)    Beta blocker given within 24 hours of surgery  Rhythm: regular  Rate: normal    (+) hypertension, CAD, CHF Diastolic >=55%,     ROS comment: No recent cardiac complaints.  Has scheduled stress pending for transplant eval.     Neuro/Psych- negative ROS  GI/Hepatic/Renal/Endo    (+)   hepatitis C, renal disease (on PD, last treatment 2 days ago) ESRD and dialysis,     Musculoskeletal     Abdominal    Substance History      OB/GYN          Other        ROS/Med Hx Other: ECHO 5/17/19 EF 55-59%, MOD T SEVERE CONCENTRIC LEFT LV HYPERTROPHY, GRADE I LV DIASTOLIC DYSFUCTION                Anesthesia Plan    ASA 4     general and MAC   total IV anesthesia    Anesthetic plan, risks, benefits, and alternatives have been provided, discussed and informed consent has been obtained with: patient.        CODE STATUS:

## 2022-09-02 NOTE — ANESTHESIA POSTPROCEDURE EVALUATION
Patient: Radha Hutchison    Procedure Summary     Date: 09/02/22 Room / Location: [facility] OR 01 / [facility] MAIN OR    Anesthesia Start: 0732 Anesthesia Stop: 0852    Procedure: HEMODIALYSIS CATHETER INSERTION (Right ) Diagnosis:       End stage renal failure on dialysis (HCC)      (End stage renal failure on dialysis (HCC) [N18.6, Z99.2])    Surgeons: iMkey Perez MD Provider: Jeremy Casiano MD    Anesthesia Type: general, MAC ASA Status: 4          Anesthesia Type: general, MAC    Vitals  Vitals Value Taken Time   /92 09/02/22 0900   Temp 36.3 °C (97.3 °F) 09/02/22 0845   Pulse 63 09/02/22 0902   Resp 12 09/02/22 0845   SpO2 94 % 09/02/22 0902   Vitals shown include unvalidated device data.        Post Anesthesia Care and Evaluation    Patient location during evaluation: bedside  Patient participation: complete - patient participated  Level of consciousness: awake, responsive to noxious stimuli, responsive to verbal stimuli, responsive to light touch, responsive to painful stimuli and responsive to physical stimuli  Pain score: 2  Pain management: adequate    Airway patency: patent  Anesthetic complications: No anesthetic complications  PONV Status: none  Cardiovascular status: acceptable and stable  Respiratory status: acceptable and nasal cannula  Hydration status: acceptable    Comments: An Anesthesiologist personally participated in the most demanding procedures (including induction and emergence if applicable) in the anesthesia plan, monitored the course of anesthesia administration at frequent intervals and remained physically present and available for immediate diagnosis and treatment of emergencies.

## 2022-09-02 NOTE — DISCHARGE INSTRUCTIONS
DISCHARGE INSTRUCTIONS  SURGICAL / AMBULATORY  PROCEDURES      For your surgery you had:  General anesthesia (you may have a sore throat for the first 24 hours)  IV sedation.  Local anesthesia  Monitored anesthesia Care  .   You may experience dizziness, drowsiness, or light-headedness for several hours following surgery/procedure.  Do not stay alone today or tonight.  Limit your activity for 24 hours.  Resume your diet slowly.  Follow whatever special dietary instructions you may have been given by your doctor.  You should not drive or operate machinery, drink alcohol, or sign legally binding documents for 24 hours or while you are taking pain medication.  NOTIFY YOUR DOCTOR IF YOU EXPERIENCE ANY OF THE FOLLOWING:  Temperature greater than 101 degrees Fahrenheit  Shaking Chills  Redness or excessive drainage from incision  Nausea, vomiting and/or pain that is not controlled by prescribed medications  Increase in bleeding or bleeding that is excessive  Unable to urinate in 6 hours after surgery  If unable to reach your doctor, please go to the closest Emergency Room    Medications per physician instructions as indicated on Discharge Medication Information Sheet.  You should see [facility] for follow-up care   on AS PREVIOUSLY SCHEDULED    SPECIAL INSTRUCTIONS:

## 2022-09-02 NOTE — OP NOTE
Procedure Report    Patient Name:  Radha Hutchison  YOB: 1970    Date of Surgery:  9/2/2022     Indications: Please refer to dictated H&P for details.    Pre-op Diagnosis:   End-stage renal disease with need for hemodialysis access       Post-op Diagnosis:  Same    Surgeon:  Mikey Perez MD    Procedure:  Placement of right internal jugular tunneled dialysis catheter under ultrasound and fluoroscopic guidance.      Anesthesia: GETA    Estimated Blood Loss: Minimal    Specimen: None       Findings: Adequate placement of tunneled dialysis catheter under fluoroscopic guidance with excellent aspiration and flushing of the ports.      Complications: None    Description of Procedure: Patient was brought back to the operating room placed on the operating table in supine position.  Upon induction of monitored anesthetic care and administration of appropriate antibiotic therapy bilateral chest neck and shoulders were prepped and draped in normal sterile fashion.  After appropriate timeout was performed ultrasound guidance was used to visualize the internal jugular vein lateral to the pulsatile carotid artery.  Patient was placed in Trendelenburg position and the vein was noted to be patent.  Local anesthetic was injected overlying the vein at which point Cook needle was used to access the vein with modified Seldinger technique under direct visualization.  Dark venous blood was aspirated into the syringe which was subsequently removed and the J-wire was then passed down the vena cava and into the heart as evidenced by some mild ectopy on the monitor.  This was resolved by pulling the wire back slightly and the needle was removed.   The course of the catheter was estimated under fluoroscopic guidance with a hemostat placed at the level of the atriocaval junction and the exit site on the right chest wall was chosen.  Using a #11 blade scalpel a small punctate incision was created at the level of the wire  insertion at the neck and then this was used to create the chest wall incision after local anesthetic had been injected both at the site and along the course of the catheter from the right chest wall up to the right neck where the wire had been placed.  The 19 cm HemoSplit catheter was brought onto the operative field and had been connected to the tunneler.  This was used to tunnel the catheter along with the proposed course and was brought out posterior to the J-wire at the level of the neck with cuff beneath the skin at the level of the right chest wall.  Subsequently in the tract was dilated serially with the dilators from the kit at the level of the neck and pressure was held.  Finally under direct fluoroscopic visualization the large sheath was passed over the wire making sure the wire was easily mobile throughout its passage and sheath was noted passed down to the level of the right side of the heart.  The inner portion of the sheath as well as the wire was then removed and a finger was used to occlude the hub.  At this point the HemoSplit catheter was then passed through the sheath down to the level of the atriocaval junction and the external sheath was then torn away making sure to maintain the catheter beneath the level of the skin with continuous forward pressure.   Once the entirety of the external sheath has been removed a fluoroscopy shot was obtained showing evidence of an adequate course of the tunneled dialysis catheter and adequate placement at the level of the atriocaval junction.  The right neck incision was then closed with a 4-0 Monocryl subcuticular suture being careful not to stick the line.  A Biopatch was placed at the level the right chest wall incision and the catheter was secured with 3-0 nylon suture.  Both ports aspirated and flushed quite easily with no evidence of kinking or obstruction.  The appropriate amount of high-dose heparin was used to prep the line itself and end caps were  placed.  Finally dressings in the form of 4 x 4 and Tegaderm over the right chest wall incision and Dermabond at the level of the right neck incision were placed and the procedure was completed.  Please note that the patient tolerated the procedure well with no complications or difficulties and was taken back to the postanesthesia care unit in stable condition.      Mikey Perez MD     Date: 9/2/2022  Time: 08:59 EDT

## 2022-09-04 ENCOUNTER — HOSPITAL ENCOUNTER (EMERGENCY)
Facility: HOSPITAL | Age: 52
Discharge: HOME | End: 2022-09-04
Attending: STUDENT IN AN ORGANIZED HEALTH CARE EDUCATION/TRAINING PROGRAM | Admitting: STUDENT IN AN ORGANIZED HEALTH CARE EDUCATION/TRAINING PROGRAM

## 2022-09-04 VITALS
BODY MASS INDEX: 26.64 KG/M2 | SYSTOLIC BLOOD PRESSURE: 141 MMHG | WEIGHT: 179.9 LBS | RESPIRATION RATE: 16 BRPM | TEMPERATURE: 97.6 F | DIASTOLIC BLOOD PRESSURE: 91 MMHG | HEIGHT: 69 IN | OXYGEN SATURATION: 100 % | HEART RATE: 64 BPM

## 2022-09-04 DIAGNOSIS — Z48.00 ENCOUNTER FOR CHANGE OF DRESSING: Primary | ICD-10-CM

## 2022-09-04 PROCEDURE — 99202 OFFICE O/P NEW SF 15 MIN: CPT

## 2022-09-04 NOTE — ED PROVIDER NOTES
Subjective   Patient is a 52-year-old male presenting today due to needing a dressing change on his dialysis port.  Patient states that he just got a dialysis port put on the right upper chest 2 days ago and was told to come to the ED for dressing change if the become saturated.  He has a's first hemodialysis scheduled for tomorrow and then he follows up with Dr. Van on the 9/6 where he will be given supplies to change the dressing on his own when needed. He has no other complaints.  He denies fever, chills, nausea or vomiting.      History provided by:  Patient   used: No        Review of Systems   Constitutional: Negative.    HENT: Negative.    Eyes: Negative.    Respiratory: Negative.    Cardiovascular: Negative.    Gastrointestinal: Negative.    Endocrine: Negative.    Genitourinary: Negative.    Musculoskeletal: Negative.    Skin: Negative.    Allergic/Immunologic: Negative.    Neurological: Negative.    Hematological: Negative.    Psychiatric/Behavioral: Negative.        Past Medical History:   Diagnosis Date   • CHF (congestive heart failure) ([facility]) May 2021   • Chronic kidney disease 2021    Stage 5   • Coronary artery disease May 2021   • History of COVID-19 09/2020   • Hypertension Sept. 1990   • Peritoneal dialysis catheter in place ([facility])     STARTED 9/2021       Allergies   Allergen Reactions   • Coconut Anaphylaxis   • Morphine Anaphylaxis and Swelling       Past Surgical History:   Procedure Laterality Date   • APPENDECTOMY  1994   • INSERTION HEMODIALYSIS CATHETER Right 9/2/2022    Procedure: HEMODIALYSIS CATHETER INSERTION;  Surgeon: Mikey Perez MD;  Location: [facility] OR;  Service: Vascular;  Laterality: Right;   • INSERTION PERITONEAL DIALYSIS CATHETER     • INSERTION PERITONEAL DIALYSIS CATHETER N/A 7/11/2022    Procedure: DIAGNOSTIC LAPAROSCOPY,  peritoneal dialysis pexy;  Surgeon: Danie Morocho MD;  Location: [facility] OR;  Service: General;   Laterality: N/A;       Family History   Problem Relation Age of Onset   • Cancer Mother    • Hypertension Mother    • Heart disease Maternal Grandfather    • Malig Hyperthermia Neg Hx        Social History     Socioeconomic History   • Marital status: Single   Tobacco Use   • Smoking status: Former Smoker     Packs/day: 1.00     Years: 20.00     Pack years: 20.00     Types: Cigarettes     Start date: 3/15/1987     Quit date: 2022     Years since quittin.5   • Smokeless tobacco: Never Used   Vaping Use   • Vaping Use: Never used   Substance and Sexual Activity   • Alcohol use: Never   • Drug use: Never   • Sexual activity: Defer     Partners: Female           Objective   Physical Exam  Vitals and nursing note reviewed.   Constitutional:       General: He is not in acute distress.     Appearance: Normal appearance. He is normal weight. He is not ill-appearing, toxic-appearing or diaphoretic.   HENT:      Head: Normocephalic and atraumatic.      Nose: Nose normal.      Mouth/Throat:      Mouth: Mucous membranes are moist.   Eyes:      Extraocular Movements: Extraocular movements intact.      Conjunctiva/sclera: Conjunctivae normal.      Pupils: Pupils are equal, round, and reactive to light.   Cardiovascular:      Rate and Rhythm: Normal rate and regular rhythm.      Heart sounds: Normal heart sounds.   Pulmonary:      Effort: Pulmonary effort is normal.      Breath sounds: Normal breath sounds.   Musculoskeletal:         General: Normal range of motion.      Cervical back: Normal range of motion and neck supple.   Skin:     General: Skin is warm and dry.      Comments: Dialysis port to the  right upper chest, dressing is saturated with blood.    Neurological:      General: No focal deficit present.      Mental Status: He is alert and oriented to person, place, and time.   Psychiatric:         Mood and Affect: Mood normal.         Behavior: Behavior normal.         Procedures           ED Course                                            MDM  Number of Diagnoses or Management Options  Diagnosis management comments: I have spoken with patient. I have explained the patient´s condition, diagnoses and treatment plan based on the information available to me at this time. I have answered the patient's questions and addressed any concerns. The patient has a good  understanding of the patient´s diagnosis, condition, and treatment plan as can be expected at this point. The vital signs have been stable. The patient´s condition is stable and appropriate for discharge from the emergency department.      The patient will pursue further outpatient evaluation with the primary care physician or other designated or consulting physician as outlined in the discharge instructions. They are agreeable to this plan of care and follow-up instructions have been explained in detail. The patient has received these instructions in written format and have expressed an understanding of the discharge instructions. The patient is aware that any significant change in condition or worsening of symptoms should prompt an immediate return to this or the closest emergency department or call to 911.      Risk of Complications, Morbidity, and/or Mortality  Presenting problems: low  Diagnostic procedures: low  Management options: low    Patient Progress  Patient progress: resolved      Final diagnoses:   Encounter for change of dressing       ED Disposition  ED Disposition     ED Disposition   Discharge    Condition   Stable    Comment   --             No follow-up provider specified.       Medication List      No changes were made to your prescriptions during this visit.          Leatha Magallon PA-C  09/04/22 1550       Leatha Magallon PA-C  09/04/22 1557

## 2022-09-04 NOTE — DISCHARGE INSTRUCTIONS
Please go to your scheduled hemodialysis appointment tomorrow    If any new concerns please return to the ED

## 2022-09-14 ENCOUNTER — HOSPITAL ENCOUNTER (OUTPATIENT)
Dept: NUCLEAR MEDICINE | Facility: HOSPITAL | Age: 52
Discharge: HOME | End: 2022-09-14

## 2022-09-14 ENCOUNTER — HOSPITAL ENCOUNTER (OUTPATIENT)
Dept: CARDIOLOGY | Facility: HOSPITAL | Age: 52
Discharge: HOME | End: 2022-09-14
Admitting: INTERNAL MEDICINE

## 2022-09-14 DIAGNOSIS — R06.09 EXERTIONAL DYSPNEA: ICD-10-CM

## 2022-09-14 DIAGNOSIS — I11.0 HYPERTENSIVE LEFT VENTRICULAR HYPERTROPHY WITH HEART FAILURE: ICD-10-CM

## 2022-09-14 LAB
BH CV ECHO MEAS - AO ROOT DIAM: 2.8 CM
BH CV ECHO MEAS - EF(MOD-BP): 65 %
BH CV ECHO MEAS - IVSD: 1.1 CM
BH CV ECHO MEAS - LA DIMENSION: 4.1 CM
BH CV ECHO MEAS - LAT PEAK E' VEL: 7 CM/SEC
BH CV ECHO MEAS - LVIDD: 4.4 CM
BH CV ECHO MEAS - LVIDS: 3.4 CM
BH CV ECHO MEAS - LVPWD: 1.4 CM
BH CV ECHO MEAS - MED PEAK E' VEL: 10 CM/SEC
BH CV ECHO MEAS - MV A MAX VEL: 65 CM/SEC
BH CV ECHO MEAS - MV DEC TIME: 129 MSEC
BH CV ECHO MEAS - MV E MAX VEL: 58 CM/SEC
BH CV ECHO MEAS - MV E/A: 0.9
BH CV ECHO MEASUREMENTS AVERAGE E/E' RATIO: 6.82
IVRT: 77 MSEC
LEFT ATRIUM VOLUME INDEX: 32 ML/M2
MAXIMAL PREDICTED HEART RATE: 170 BPM
STRESS TARGET HR: 145 BPM

## 2022-09-14 PROCEDURE — 78452 HT MUSCLE IMAGE SPECT MULT: CPT

## 2022-09-14 PROCEDURE — 93018 CV STRESS TEST I&R ONLY: CPT | Performed by: INTERNAL MEDICINE

## 2022-09-14 PROCEDURE — A9502 TC99M TETROFOSMIN: HCPCS | Performed by: INTERNAL MEDICINE

## 2022-09-14 PROCEDURE — 25010000002 REGADENOSON 0.4 MG/5ML SOLUTION: Performed by: INTERNAL MEDICINE

## 2022-09-14 PROCEDURE — 78452 HT MUSCLE IMAGE SPECT MULT: CPT | Performed by: INTERNAL MEDICINE

## 2022-09-14 PROCEDURE — 0 TECHNETIUM TETROFOSMIN KIT: Performed by: INTERNAL MEDICINE

## 2022-09-14 PROCEDURE — 93306 TTE W/DOPPLER COMPLETE: CPT

## 2022-09-14 PROCEDURE — 93017 CV STRESS TEST TRACING ONLY: CPT

## 2022-09-14 PROCEDURE — 93306 TTE W/DOPPLER COMPLETE: CPT | Performed by: INTERNAL MEDICINE

## 2022-09-14 RX ADMIN — TETROFOSMIN 1 DOSE: 1.38 INJECTION, POWDER, LYOPHILIZED, FOR SOLUTION INTRAVENOUS at 08:43

## 2022-09-14 RX ADMIN — REGADENOSON 0.4 MG: 0.08 INJECTION, SOLUTION INTRAVENOUS at 08:42

## 2022-09-14 RX ADMIN — TETROFOSMIN 1 DOSE: 1.38 INJECTION, POWDER, LYOPHILIZED, FOR SOLUTION INTRAVENOUS at 07:30

## 2022-09-15 ENCOUNTER — TELEPHONE (OUTPATIENT)
Dept: CARDIOLOGY | Facility: CLINIC | Age: 52
End: 2022-09-15

## 2022-09-15 LAB
BH CV IMMEDIATE POST TECH DATA BLOOD PRESSURE: NORMAL MMHG
BH CV IMMEDIATE POST TECH DATA HEART RATE: 69 BPM
BH CV IMMEDIATE POST TECH DATA OXYGEN SATS: 98 %
BH CV REST NUCLEAR ISOTOPE DOSE: 10.1 MCI
BH CV SIX MINUTE RECOVERY TECH DATA BLOOD PRESSURE: NORMAL
BH CV SIX MINUTE RECOVERY TECH DATA HEART RATE: 67 BPM
BH CV SIX MINUTE RECOVERY TECH DATA OXYGEN SATURATION: 96 %
BH CV STRESS BP STAGE 1: NORMAL
BH CV STRESS COMMENTS STAGE 1: NORMAL
BH CV STRESS DOSE REGADENOSON STAGE 1: 0.4
BH CV STRESS DURATION MIN STAGE 1: 0
BH CV STRESS DURATION SEC STAGE 1: 10
BH CV STRESS HR STAGE 1: 72
BH CV STRESS NUCLEAR ISOTOPE DOSE: 37.8 MCI
BH CV STRESS O2 STAGE 1: 98
BH CV STRESS PROTOCOL 1: NORMAL
BH CV STRESS RECOVERY BP: NORMAL MMHG
BH CV STRESS RECOVERY HR: 67 BPM
BH CV STRESS RECOVERY O2: 96 %
BH CV STRESS STAGE 1: 1
BH CV THREE MINUTE POST TECH DATA BLOOD PRESSURE: NORMAL MMHG
BH CV THREE MINUTE POST TECH DATA HEART RATE: 67 BPM
BH CV THREE MINUTE POST TECH DATA OXYGEN SATURATION: 98 %
LV EF NUC BP: 54 %
MAXIMAL PREDICTED HEART RATE: 170 BPM
PERCENT MAX PREDICTED HR: 42.35 %
STRESS BASELINE BP: NORMAL MMHG
STRESS BASELINE HR: 63 BPM
STRESS O2 SAT REST: 96 %
STRESS PERCENT HR: 50 %
STRESS POST O2 SAT PEAK: 98 %
STRESS POST PEAK BP: NORMAL MMHG
STRESS POST PEAK HR: 72 BPM
STRESS TARGET HR: 145 BPM

## 2022-09-15 NOTE — PROGRESS NOTES
Chief Complaint  Congestive Heart Failure, Hypertension, and M.I.    Subjective            Radha ROSA Hutchison presents to [facility] CARDIOLOGY  History of Present Illness  Mr. Hutchison is a new patient who was referred here by Dr. Vallejo due to a history of hypertension, LVH, and diastolic CHF.  He was previously followed by a cardiologist in [place] (before relocating to this area relatively recently), and he thinks he suffered a small MI while living in [place] (I have no records relating to such an event).  His previous echo obtained in [place] in 2019 revealed preserved left ventricular systolic function with an estimated ejection fraction of 55-60%, grade I diastolic dysfunction, and moderate-severe concentric left ventricular hypertrophy.  No hemodynamically significant valvular pathology was noted on that study.  His most recent ECG obtained last month showed sinus rhythm with LVH by voltage criteria and non-diagnostic Q waves in the inferior leads.  His BP was adequately controlled at 131/85 in the office today, and he is now on multiple antihypertensive medications (carvedilol, clonidine, eplerenone, hydralazine, and long-acting nifedipine) since being followed by Dr. Vallejo.  Mr. Hutchison does have ESRD and is currently on peritoneal dialysis, but has been contemplating switching to hemodialysis in the near future.  He does not report any episodes of chest pain/pressure, palpitations, orthopnea, PND, lightheadedness, or syncope.  He does have mild chronic exertional dyspnea, which he states has increased slightly in the last few months.     Past Medical History:   Diagnosis Date   • CHF (congestive heart failure) ([facility]) May 2021   • Chronic kidney disease 2021    Stage 5   • Coronary artery disease May 2021   • History of COVID-19 09/2020   • Hypertension Sept. 1990   • Peritoneal dialysis catheter in place ([facility])     STARTED 9/2021       Past Surgical History:   • APPENDECTOMY   • INSERTION  HEMODIALYSIS CATHETER    Procedure: HEMODIALYSIS CATHETER INSERTION;  Surgeon: Mikey Perez MD;  Location: [facility] MAIN OR;  Service: Vascular;  Laterality: Right;   • INSERTION PERITONEAL DIALYSIS CATHETER   • INSERTION PERITONEAL DIALYSIS CATHETER    Procedure: DIAGNOSTIC LAPAROSCOPY,  peritoneal dialysis pexy;  Surgeon: Danie Morocho MD;  Location: [facility] MAIN OR;  Service: General;  Laterality: N/A;       Social History     Tobacco Use   • Smoking status: Former Smoker     Packs/day: 1.00     Years: 20.00     Pack years: 20.00     Types: Cigarettes     Start date: 3/15/1987     Quit date: 2022     Years since quittin.5   • Smokeless tobacco: Never Used   Vaping Use   • Vaping Use: Never used   Substance Use Topics   • Alcohol use: Never   • Drug use: Never       Family History   Problem Relation Age of Onset   • Cancer Mother    • Hypertension Mother    • Heart disease Maternal Grandfather    • Malig Hyperthermia Neg Hx         Current Outpatient Medications on File Prior to Visit   Medication Sig   • aspirin 81 MG EC tablet Take 81 mg by mouth Daily. PT TO HOLD FOR SURGERY   • calcitriol (ROCALTROL) 0.25 MCG capsule Take 0.25 mcg by mouth Daily.   • carvedilol (COREG) 25 MG tablet Take 25 mg by mouth 2 (Two) Times a Day.   • cloNIDine (CATAPRES) 0.2 MG tablet Take 0.2 mg by mouth 3 (Three) Times a Day.   • eplerenone (INSPRA) 25 MG tablet Take 25 mg by mouth Daily.   • furosemide (LASIX) 40 MG tablet Take 40 mg by mouth 2 (Two) Times a Day.   • gentamicin (GARAMYCIN) 0.1 % cream Apply 1 application topically to the appropriate area as directed Daily. Pd catheter   • hydrALAZINE (APRESOLINE) 100 MG tablet Take 100 mg by mouth 3 (Three) Times a Day.   • NIFEdipine XL (PROCARDIA XL) 90 MG 24 hr tablet Take 90 mg by mouth 2 (Two) Times a Day. as directed   • ondansetron (Zofran) 4 MG tablet Take 1 tablet by mouth Every 8 (Eight) Hours As Needed for Nausea or Vomiting.   • polyethylene glycol  "(MIRALAX) 17 g packet Take 17 g by mouth 2 (Two) Times a Day As Needed (constipation).   • sennosides-docusate (PERICOLACE) 8.6-50 MG per tablet Take 2 tablets by mouth Daily As Needed for Constipation.   • vitamin D (ERGOCALCIFEROL) 1.25 MG (17435 UT) capsule capsule Take 50,000 Units by mouth 1 (One) Time Per Week. SUNDAYS     No current facility-administered medications on file prior to visit.       Allergies   Allergen Reactions   • Coconut Anaphylaxis   • Morphine Anaphylaxis and Swelling       Review of Systems   Constitutional: Positive for fatigue. Negative for chills, fever and unexpected weight gain.   HENT: Negative for hearing loss, nosebleeds and sore throat.    Eyes: Negative for pain and visual disturbance.   Respiratory: Negative for cough, shortness of breath and wheezing.    Cardiovascular: Negative for chest pain, palpitations and leg swelling.   Gastrointestinal: Positive for abdominal pain. Negative for blood in stool and vomiting.   Endocrine: Negative for cold intolerance and heat intolerance.   Genitourinary: Negative for dysuria and hematuria.   Musculoskeletal: Positive for arthralgias. Negative for joint swelling and myalgias.   Skin: Negative for color change, pallor and rash.   Neurological: Negative for tremors, syncope, weakness, light-headedness and headache.   Hematological: Negative for adenopathy. Does not bruise/bleed easily.        Objective     /85   Pulse 70   Ht 175.3 cm (69\")   Wt 78.9 kg (174 lb)   BMI 25.70 kg/m²       Physical Exam  Constitutional:       General: He is not in acute distress.     Appearance: Normal appearance.   HENT:      Head: Atraumatic.      Mouth/Throat:      Mouth: Mucous membranes are moist.      Pharynx: Oropharynx is clear. No oropharyngeal exudate.   Eyes:      General: No scleral icterus.     Conjunctiva/sclera: Conjunctivae normal.   Neck:      Vascular: No carotid bruit or JVD.   Cardiovascular:      Rate and Rhythm: Normal rate and " regular rhythm.  No extrasystoles are present.     Chest Wall: PMI is not displaced.      Pulses:           Radial pulses are 2+ on the right side and 2+ on the left side.      Heart sounds: S1 normal and S2 normal. No murmur heard.    No friction rub. Gallop present. S4 sounds present. No S3 sounds.   Pulmonary:      Effort: Pulmonary effort is normal. Prolonged expiration present. No tachypnea or respiratory distress.      Breath sounds: No decreased breath sounds, wheezing, rhonchi or rales.   Chest:      Chest wall: No tenderness.   Abdominal:      General: Bowel sounds are normal. There is no distension.      Palpations: Abdomen is soft. There is no mass.      Tenderness: There is no abdominal tenderness.      Comments: Peritoneal dialysis catheter present.   Musculoskeletal:         General: No swelling, tenderness or deformity.      Cervical back: Neck supple. No tenderness.      Right lower leg: No edema.      Left lower leg: No edema.   Skin:     General: Skin is warm and dry.      Coloration: Skin is not jaundiced.      Findings: No erythema or rash.      Nails: There is no clubbing.   Neurological:      General: No focal deficit present.      Mental Status: He is alert and oriented to person, place, and time.      Motor: No weakness.   Psychiatric:         Mood and Affect: Mood normal.         Behavior: Behavior normal.       Result Review :     The following data was reviewed by: Jeremy Donald MD on 08/16/2022:    CMP    CMP 7/8/22 7/11/22 9/2/22   Glucose 85 91 104 (A)   BUN 26 (A) 38 (A) 36 (A)   Creatinine 2.70 (A) 3.16 (A) 2.55 (A)   Sodium 137 135 (A) 136   Potassium 4.9 4.1 4.2   Chloride 101 100 102   Calcium 9.4 9.4 9.0   (A) Abnormal value            CBC    CBC 5/14/22 7/8/22 9/2/22   WBC 8.62 8.48 8.38   RBC 4.98 4.99 5.32   Hemoglobin 14.2 13.8 15.0   Hematocrit 43.5 42.2 44.0   MCV 87.3 84.6 82.7   MCH 28.5 27.7 28.2   MCHC 32.6 32.7 34.1   RDW 14.4 13.6 15.9 (A)   Platelets 253 255 258    (A) Abnormal value                 Assessment and Plan      Diagnoses and all orders for this visit:    1. Hypertensive left ventricular hypertrophy with heart failure (HCC) (Primary)  -     Adult Transthoracic Echo Complete w/ Color, Spectral and Contrast if necessary per protocol; Future  -     Stress Test With Myocardial Perfusion One Day; Future    2. Exertional dyspnea  -     Adult Transthoracic Echo Complete w/ Color, Spectral and Contrast if necessary per protocol; Future  -     Stress Test With Myocardial Perfusion One Day; Future    3. ESRD (end stage renal disease) on dialysis (HCC)    -Hypertension with history of moderate-severe LVH on old echo from 2019:  We will obtain a repeat echocardiogram for reassessment of the severity of his LVH and his overall global LV function.  His BP appears adequately controlled today.  Continue current medication regimen of Procardia XL, hydralazine, clonidine, carvedilol, and eplerenone.  We will request the remainder of his old cardiac records from [place].    -Chronic exertional dyspnea:  We will obtain an echo to rule out progression of his previously documented hypertensive heart disease or other structural abnormalities.  Check a nuclear stress test, as his symptoms could potentially represent an anginal equivalent.    -ESRD on peritoneal dialysis:  Managed by Dr. Vallejo.  He is planning a switch to hemodialysis in the near future.        Follow Up     Timing of follow up will be based on the results of the aforementioned studies.     Patient was given instructions and counseling regarding his condition or for health maintenance advice. Please see specific information pulled into the AVS if appropriate.     Radha Hutchison  reports that he quit smoking about 6 months ago. His smoking use included cigarettes. He started smoking about 35 years ago. He has a 20.00 pack-year smoking history. He has never used smokeless tobacco.  I have educated him on the risk of  diseases from using tobacco products such as cancer, COPD and heart disease.  Continued tobacco cessation was advised.       Jeremy Donald MD, FACC, [facility]  Interventional Cardiology

## 2022-09-15 NOTE — TELEPHONE ENCOUNTER
"----- Message from Jeremy Donald MD sent at 9/15/2022 12:59 PM EDT -----  Please notify Mr. Hutchison that his nuclear stress test was normal.  His recent echocardiogram also showed normal left ventricular systolic function and no significant valvular disease.  The echo report (interpreted by another physician) also mentions \"borderline\" concentric LVH, but I reviewed the echo images myself and I believe he actually has moderate concentric LVH (which is still improved from the moderate-severe LVH noted on his previous echo in [place] in 2019).  For now, I would just recommend optimal blood pressure control (his BP looked good at his last visit and he is on 5 anti-hypertensive meds prescribed by Dr. Vallejo).  Unless he is having new problems, he does not need any further cardiac testing at present and can just follow up in our clinic with Kellee Stovall in one year.  "

## 2022-10-03 ENCOUNTER — HOSPITAL ENCOUNTER (OUTPATIENT)
Dept: CARDIOLOGY | Facility: HOSPITAL | Age: 52
Discharge: HOME | End: 2022-10-03
Admitting: INTERNAL MEDICINE

## 2022-10-03 DIAGNOSIS — N18.6 END STAGE RENAL DISEASE: ICD-10-CM

## 2022-10-03 LAB
BH CV VAS MEAS BASILIC ANTECUBITAL FOSSA LEFT: 0.27 CM
BH CV VAS MEAS BASILIC ANTECUBITAL FOSSA RIGHT: 0.33 CM
BH CV VAS MEAS BASILIC FOREARM LEFT - DIST: 0.17 CM
BH CV VAS MEAS BASILIC FOREARM LEFT - MID: 0.17 CM
BH CV VAS MEAS BASILIC FOREARM LEFT - PROX: 0.16 CM
BH CV VAS MEAS BASILIC FOREARM RIGHT - DIST: 0.15 CM
BH CV VAS MEAS BASILIC FOREARM RIGHT - MID: 0.13 CM
BH CV VAS MEAS BASILIC FOREARM RIGHT - PROX: 0.16 CM
BH CV VAS MEAS BASILIC UPPER ARM LEFT - DIST: 0.37 CM
BH CV VAS MEAS BASILIC UPPER ARM LEFT - MID: 0.34 CM
BH CV VAS MEAS BASILIC UPPER ARM LEFT - PROX: 0.38 CM
BH CV VAS MEAS BASILIC UPPER ARM RIGHT - DIST: 0.46 CM
BH CV VAS MEAS BASILIC UPPER ARM RIGHT - MID: 0.36 CM
BH CV VAS MEAS BASILIC UPPER ARM RIGHT - PROX: 0.38 CM
BH CV VAS MEAS CEPHALIC ANTECUBITAL FOSSA LEFT: 0.51 CM
BH CV VAS MEAS CEPHALIC ANTECUBITAL FOSSA RIGHT: 0.47 CM
BH CV VAS MEAS CEPHALIC FOREARM LEFT - DIST: 0.18 CM
BH CV VAS MEAS CEPHALIC FOREARM LEFT - MID: 0.19 CM
BH CV VAS MEAS CEPHALIC FOREARM LEFT - PROX: 0.17 CM
BH CV VAS MEAS CEPHALIC FOREARM RIGHT - DIST: 0.2 CM
BH CV VAS MEAS CEPHALIC FOREARM RIGHT - MID: 0.2 CM
BH CV VAS MEAS CEPHALIC FOREARM RIGHT - PROX: 0.18 CM
BH CV VAS MEAS CEPHALIC UPPER ARM LEFT - DIST: 0.69 CM
BH CV VAS MEAS CEPHALIC UPPER ARM LEFT - MID: 0.43 CM
BH CV VAS MEAS CEPHALIC UPPER ARM LEFT - PROX: 0.3 CM
BH CV VAS MEAS CEPHALIC UPPER ARM RIGHT - DIST: 0.31 CM
BH CV VAS MEAS CEPHALIC UPPER ARM RIGHT - MID: 0.22 CM
BH CV VAS MEAS CEPHALIC UPPER ARM RIGHT - PROX: 0.22 CM
MAXIMAL PREDICTED HEART RATE: 170 BPM
STRESS TARGET HR: 145 BPM

## 2022-10-03 PROCEDURE — 93985 DUP-SCAN HEMO COMPL BI STD: CPT | Performed by: SURGERY

## 2022-10-03 PROCEDURE — 93985 DUP-SCAN HEMO COMPL BI STD: CPT

## 2022-10-04 ENCOUNTER — OFFICE VISIT (OUTPATIENT)
Dept: SURGERY | Facility: CLINIC | Age: 52
End: 2022-10-04

## 2022-10-04 ENCOUNTER — PREP FOR SURGERY (OUTPATIENT)
Dept: OTHER | Facility: HOSPITAL | Age: 52
End: 2022-10-04

## 2022-10-04 VITALS — HEIGHT: 69 IN | WEIGHT: 178 LBS | BODY MASS INDEX: 26.36 KG/M2 | RESPIRATION RATE: 18 BRPM

## 2022-10-04 DIAGNOSIS — Z99.2 PERITONEAL DIALYSIS CATHETER IN PLACE: Primary | ICD-10-CM

## 2022-10-04 PROCEDURE — 99213 OFFICE O/P EST LOW 20 MIN: CPT | Performed by: SURGERY

## 2022-10-04 RX ORDER — ACETAMINOPHEN AND CODEINE PHOSPHATE 300; 30 MG/1; MG/1
1 TABLET ORAL EVERY 4 HOURS PRN
COMMUNITY
Start: 2022-09-02 | End: 2022-10-17

## 2022-10-04 RX ORDER — SODIUM CHLORIDE, SODIUM LACTATE, POTASSIUM CHLORIDE, CALCIUM CHLORIDE 600; 310; 30; 20 MG/100ML; MG/100ML; MG/100ML; MG/100ML
70 INJECTION, SOLUTION INTRAVENOUS CONTINUOUS
Status: CANCELLED | OUTPATIENT
Start: 2022-10-04

## 2022-10-04 RX ORDER — ASPIRIN 81 MG/1
1 TABLET ORAL DAILY
COMMUNITY
Start: 2022-10-03 | End: 2022-10-17

## 2022-10-04 RX ORDER — ONDANSETRON 2 MG/ML
4 INJECTION INTRAMUSCULAR; INTRAVENOUS EVERY 6 HOURS PRN
Status: CANCELLED | OUTPATIENT
Start: 2022-10-04

## 2022-10-04 NOTE — PROGRESS NOTES
Inpatient History and Physical Surgical Orders    Preadmission Location:   Preadmission Time:  Facility:  Surgery Date:  Surgery Time:  Preadmission Test date:     Chief Complaint  Outpatient History and Physical / Surgical Orders    Primary Care Provider: Provider, No Known    Referring Provider: No ref. provider found    Subjective      Patient Name: Radha Hutchison : 1970    HPI  The patient is a 52-year-old gentleman who is a former peritoneal dialysis patient who has been converted over to hemodialysis recently.  He had to stop doing peritoneal dialysis because of extreme pain with drainage.  He was referred here to remove that peritoneal dialysis catheter.    Past History:  Medical History: has a past medical history of CHF (congestive heart failure) ([facility]) (May 2021), Chronic kidney disease (), Coronary artery disease (May 2021), History of COVID-19 (2020), Hypertension (1990), and Peritoneal dialysis catheter in place ([facility]).   Surgical History: has a past surgical history that includes Appendectomy (); Peritoneal Dialysis Catheter; Peritoneal Dialysis Catheter (N/A, 2022); and Hemodialysis Catheter (Right, 2022).   Family History: family history includes Cancer in his mother; Heart disease in his maternal grandfather; Hypertension in his mother.   Social History: reports that he quit smoking about 7 months ago. His smoking use included cigarettes. He started smoking about 35 years ago. He has a 20.00 pack-year smoking history. He has never used smokeless tobacco. He reports that he does not drink alcohol and does not use drugs.  Allergies: Coconut and Morphine       Current Outpatient Medications:   •  acetaminophen-codeine (TYLENOL #3) 300-30 MG per tablet, Take 1 tablet by mouth Every 4 (Four) Hours As Needed. for pain, Disp: , Rfl:   •  aspirin 81 MG EC tablet, Take 81 mg by mouth Daily. PT TO HOLD FOR SURGERY, Disp: , Rfl:   •  aspirin 81 MG EC tablet, Take 1 tablet by  "mouth Daily., Disp: , Rfl:   •  calcitriol (ROCALTROL) 0.25 MCG capsule, Take 0.25 mcg by mouth Daily., Disp: , Rfl:   •  carvedilol (COREG) 25 MG tablet, Take 25 mg by mouth 2 (Two) Times a Day., Disp: , Rfl:   •  cloNIDine (CATAPRES) 0.2 MG tablet, Take 0.2 mg by mouth 3 (Three) Times a Day., Disp: , Rfl:   •  eplerenone (INSPRA) 25 MG tablet, Take 25 mg by mouth Daily., Disp: , Rfl:   •  furosemide (LASIX) 40 MG tablet, Take 40 mg by mouth 2 (Two) Times a Day., Disp: , Rfl:   •  gentamicin (GARAMYCIN) 0.1 % cream, Apply 1 application topically to the appropriate area as directed Daily. Pd catheter, Disp: , Rfl:   •  hydrALAZINE (APRESOLINE) 100 MG tablet, Take 100 mg by mouth 3 (Three) Times a Day., Disp: , Rfl:   •  NIFEdipine XL (PROCARDIA XL) 90 MG 24 hr tablet, Take 90 mg by mouth 2 (Two) Times a Day. as directed, Disp: , Rfl:   •  ondansetron (Zofran) 4 MG tablet, Take 1 tablet by mouth Every 8 (Eight) Hours As Needed for Nausea or Vomiting., Disp: 30 tablet, Rfl: 1  •  polyethylene glycol (MIRALAX) 17 g packet, Take 17 g by mouth 2 (Two) Times a Day As Needed (constipation)., Disp: 60 each, Rfl: 2  •  sennosides-docusate (PERICOLACE) 8.6-50 MG per tablet, Take 2 tablets by mouth Daily As Needed for Constipation., Disp: 30 tablet, Rfl: 1  •  vitamin D (ERGOCALCIFEROL) 1.25 MG (31608 UT) capsule capsule, Take 50,000 Units by mouth 1 (One) Time Per Week. SUNDAYS, Disp: , Rfl:        Objective   Vital Signs:   Resp 18   Ht 175.3 cm (69\")   Wt 80.7 kg (178 lb)   BMI 26.29 kg/m²       Physical Exam  Vitals and nursing note reviewed.   Constitutional:       Appearance: Normal appearance. The patient is well-developed.   Cardiovascular:      Rate and Rhythm: Normal rate and regular rhythm.   Pulmonary:      Effort: Pulmonary effort is normal.      Breath sounds: Normal air entry.   Abdominal:      General: Bowel sounds are normal.      Palpations: Abdomen is soft.  PD catheter noted on the left side of the " abdomen.     Skin:     General: Skin is warm and dry.   Neurological:      Mental Status: The patient is alert and oriented to person, place, and time.      Motor: Motor function is intact.   Psychiatric:         Mood and Affect: Mood normal.       Result Review :               Assessment and Plan   Diagnoses and all orders for this visit:    1. Peritoneal dialysis catheter in place (HCC) (Primary)    We will schedule him for a peritoneal dialysis catheter removal.  I have described the procedure to him as well as the risk and benefits and he is agreeable to proceeding.    I  Matthew Arshad MD  10/04/2022

## 2022-10-04 NOTE — H&P (VIEW-ONLY)
Inpatient History and Physical Surgical Orders    Preadmission Location:   Preadmission Time:  Facility:  Surgery Date:  Surgery Time:  Preadmission Test date:     Chief Complaint  Outpatient History and Physical / Surgical Orders    Primary Care Provider: Provider, No Known    Referring Provider: No ref. provider found    Subjective      Patient Name: Radha Hutchison : 1970    HPI  The patient is a 52-year-old gentleman who is a former peritoneal dialysis patient who has been converted over to hemodialysis recently.  He had to stop doing peritoneal dialysis because of extreme pain with drainage.  He was referred here to remove that peritoneal dialysis catheter.    Past History:  Medical History: has a past medical history of CHF (congestive heart failure) ([facility]) (May 2021), Chronic kidney disease (), Coronary artery disease (May 2021), History of COVID-19 (2020), Hypertension (1990), and Peritoneal dialysis catheter in place ([facility]).   Surgical History: has a past surgical history that includes Appendectomy (); Peritoneal Dialysis Catheter; Peritoneal Dialysis Catheter (N/A, 2022); and Hemodialysis Catheter (Right, 2022).   Family History: family history includes Cancer in his mother; Heart disease in his maternal grandfather; Hypertension in his mother.   Social History: reports that he quit smoking about 7 months ago. His smoking use included cigarettes. He started smoking about 35 years ago. He has a 20.00 pack-year smoking history. He has never used smokeless tobacco. He reports that he does not drink alcohol and does not use drugs.  Allergies: Coconut and Morphine       Current Outpatient Medications:   •  acetaminophen-codeine (TYLENOL #3) 300-30 MG per tablet, Take 1 tablet by mouth Every 4 (Four) Hours As Needed. for pain, Disp: , Rfl:   •  aspirin 81 MG EC tablet, Take 81 mg by mouth Daily. PT TO HOLD FOR SURGERY, Disp: , Rfl:   •  aspirin 81 MG EC tablet, Take 1 tablet by  "mouth Daily., Disp: , Rfl:   •  calcitriol (ROCALTROL) 0.25 MCG capsule, Take 0.25 mcg by mouth Daily., Disp: , Rfl:   •  carvedilol (COREG) 25 MG tablet, Take 25 mg by mouth 2 (Two) Times a Day., Disp: , Rfl:   •  cloNIDine (CATAPRES) 0.2 MG tablet, Take 0.2 mg by mouth 3 (Three) Times a Day., Disp: , Rfl:   •  eplerenone (INSPRA) 25 MG tablet, Take 25 mg by mouth Daily., Disp: , Rfl:   •  furosemide (LASIX) 40 MG tablet, Take 40 mg by mouth 2 (Two) Times a Day., Disp: , Rfl:   •  gentamicin (GARAMYCIN) 0.1 % cream, Apply 1 application topically to the appropriate area as directed Daily. Pd catheter, Disp: , Rfl:   •  hydrALAZINE (APRESOLINE) 100 MG tablet, Take 100 mg by mouth 3 (Three) Times a Day., Disp: , Rfl:   •  NIFEdipine XL (PROCARDIA XL) 90 MG 24 hr tablet, Take 90 mg by mouth 2 (Two) Times a Day. as directed, Disp: , Rfl:   •  ondansetron (Zofran) 4 MG tablet, Take 1 tablet by mouth Every 8 (Eight) Hours As Needed for Nausea or Vomiting., Disp: 30 tablet, Rfl: 1  •  polyethylene glycol (MIRALAX) 17 g packet, Take 17 g by mouth 2 (Two) Times a Day As Needed (constipation)., Disp: 60 each, Rfl: 2  •  sennosides-docusate (PERICOLACE) 8.6-50 MG per tablet, Take 2 tablets by mouth Daily As Needed for Constipation., Disp: 30 tablet, Rfl: 1  •  vitamin D (ERGOCALCIFEROL) 1.25 MG (63568 UT) capsule capsule, Take 50,000 Units by mouth 1 (One) Time Per Week. SUNDAYS, Disp: , Rfl:        Objective   Vital Signs:   Resp 18   Ht 175.3 cm (69\")   Wt 80.7 kg (178 lb)   BMI 26.29 kg/m²       Physical Exam  Vitals and nursing note reviewed.   Constitutional:       Appearance: Normal appearance. The patient is well-developed.   Cardiovascular:      Rate and Rhythm: Normal rate and regular rhythm.   Pulmonary:      Effort: Pulmonary effort is normal.      Breath sounds: Normal air entry.   Abdominal:      General: Bowel sounds are normal.      Palpations: Abdomen is soft.  PD catheter noted on the left side of the " abdomen.     Skin:     General: Skin is warm and dry.   Neurological:      Mental Status: The patient is alert and oriented to person, place, and time.      Motor: Motor function is intact.   Psychiatric:         Mood and Affect: Mood normal.       Result Review :               Assessment and Plan   Diagnoses and all orders for this visit:    1. Peritoneal dialysis catheter in place (HCC) (Primary)    We will schedule him for a peritoneal dialysis catheter removal.  I have described the procedure to him as well as the risk and benefits and he is agreeable to proceeding.    I  Matthew Arshad MD  10/04/2022

## 2022-10-17 NOTE — PRE-PROCEDURE INSTRUCTIONS
Patient instructed to have no food past midnight, clears up to 2 hours prior to arrival time. Patient instructed to wear no lotions, jewelry or piercing's day of surgery.  Patient to shower with surgical soap am of surgery. Patient to take all am meds except lasix.

## 2022-10-18 ENCOUNTER — ANESTHESIA EVENT (OUTPATIENT)
Dept: PERIOP | Facility: HOSPITAL | Age: 52
End: 2022-10-18

## 2022-10-19 ENCOUNTER — OFFICE VISIT (OUTPATIENT)
Dept: VASCULAR SURGERY | Facility: HOSPITAL | Age: 52
End: 2022-10-19

## 2022-10-19 VITALS
RESPIRATION RATE: 16 BRPM | OXYGEN SATURATION: 98 % | TEMPERATURE: 98.6 F | DIASTOLIC BLOOD PRESSURE: 95 MMHG | SYSTOLIC BLOOD PRESSURE: 150 MMHG | HEART RATE: 68 BPM

## 2022-10-19 DIAGNOSIS — Z99.2 ESRD ON DIALYSIS: Primary | ICD-10-CM

## 2022-10-19 DIAGNOSIS — N18.6 ESRD ON DIALYSIS: Primary | ICD-10-CM

## 2022-10-19 PROCEDURE — G0463 HOSPITAL OUTPT CLINIC VISIT: HCPCS | Performed by: SURGERY

## 2022-10-19 PROCEDURE — 99214 OFFICE O/P EST MOD 30 MIN: CPT | Performed by: SURGERY

## 2022-10-19 NOTE — PROGRESS NOTES
[facility]     Progress Note    Patient Name: Radha Hutchison  : 1970  MRN: [id number]  Primary Care Physician:  Provider, No Known  Date of admission: (Not on file)    Subjective   Subjective     Here for evaluation for permanent access for hemodialysis.  Currently undergoing hemodialysis via a tunneled catheter.  He had been doing PD however he kept having difficulties with the PD catheter and is having it removed tomorrow.  Right-handed.    Objective   Objective     Vitals:   Temp:  [98.6 °F (37 °C)] 98.6 °F (37 °C)  Heart Rate:  [68] 68  Resp:  [16] 16  BP: (150)/(95) 150/95    Physical Exam   General: Alert, no acute distress.  Extremities: Symmetric.  Pulses: +2 left radial, +2 left brachial    Diagnostic studies: Vein mapping ultrasound dated 10/3/2022 demonstrates satisfactory left upper arm cephalic and bilateral upper arm basilic veins.    Assessment & Plan   Assessment / Plan     Assessment/Plan:    Mr. Hutchison is in need for permanent access for hemodialysis.  The plan is for creation of a left brachiocephalic arteriovenous fistula.  I have discussed with him in detail the mechanics of the procedure, the indications, benefits, risks, alternatives, as well as potential complications to include but not limited to infection, bleeding, reoperation, failure of the fistula to develop.  He appears to understand and desires to proceed.    Active Hospital Problems:  There are no active hospital problems to display for this patient.          Electronically signed by Livan Choi MD, 10/19/22, 1:21 PM EDT.

## 2022-10-20 ENCOUNTER — ANESTHESIA (OUTPATIENT)
Dept: PERIOP | Facility: HOSPITAL | Age: 52
End: 2022-10-20

## 2022-10-20 ENCOUNTER — HOSPITAL ENCOUNTER (OUTPATIENT)
Facility: HOSPITAL | Age: 52
Setting detail: HOSPITAL OUTPATIENT SURGERY
Discharge: HOME | End: 2022-10-20
Attending: SURGERY | Admitting: SURGERY

## 2022-10-20 VITALS
HEIGHT: 69 IN | SYSTOLIC BLOOD PRESSURE: 130 MMHG | RESPIRATION RATE: 18 BRPM | BODY MASS INDEX: 27.46 KG/M2 | TEMPERATURE: 97.9 F | WEIGHT: 185.41 LBS | DIASTOLIC BLOOD PRESSURE: 99 MMHG | OXYGEN SATURATION: 94 % | HEART RATE: 65 BPM

## 2022-10-20 DIAGNOSIS — Z99.2 PERITONEAL DIALYSIS CATHETER IN PLACE: ICD-10-CM

## 2022-10-20 LAB
ANION GAP SERPL CALCULATED.3IONS-SCNC: 10.9 MMOL/L (ref 5–15)
BASOPHILS # BLD AUTO: 0.08 10*3/MM3 (ref 0–0.2)
BASOPHILS NFR BLD AUTO: 0.9 % (ref 0–1.5)
BUN SERPL-MCNC: 44 MG/DL (ref 6–20)
BUN/CREAT SERPL: 15.5 (ref 7–25)
CALCIUM SPEC-SCNC: 9.4 MG/DL (ref 8.6–10.5)
CHLORIDE SERPL-SCNC: 102 MMOL/L (ref 98–107)
CO2 SERPL-SCNC: 25.1 MMOL/L (ref 22–29)
CREAT SERPL-MCNC: 2.83 MG/DL (ref 0.76–1.27)
DEPRECATED RDW RBC AUTO: 43.7 FL (ref 37–54)
EGFRCR SERPLBLD CKD-EPI 2021: 26.3 ML/MIN/1.73
EOSINOPHIL # BLD AUTO: 0.54 10*3/MM3 (ref 0–0.4)
EOSINOPHIL NFR BLD AUTO: 5.8 % (ref 0.3–6.2)
ERYTHROCYTE [DISTWIDTH] IN BLOOD BY AUTOMATED COUNT: 14.3 % (ref 12.3–15.4)
GLUCOSE SERPL-MCNC: 105 MG/DL (ref 65–99)
HCT VFR BLD AUTO: 38.3 % (ref 37.5–51)
HGB BLD-MCNC: 13.1 G/DL (ref 13–17.7)
IMM GRANULOCYTES # BLD AUTO: 0.03 10*3/MM3 (ref 0–0.05)
IMM GRANULOCYTES NFR BLD AUTO: 0.3 % (ref 0–0.5)
LYMPHOCYTES # BLD AUTO: 2.02 10*3/MM3 (ref 0.7–3.1)
LYMPHOCYTES NFR BLD AUTO: 21.5 % (ref 19.6–45.3)
MCH RBC QN AUTO: 28.8 PG (ref 26.6–33)
MCHC RBC AUTO-ENTMCNC: 34.2 G/DL (ref 31.5–35.7)
MCV RBC AUTO: 84.2 FL (ref 79–97)
MONOCYTES # BLD AUTO: 1.12 10*3/MM3 (ref 0.1–0.9)
MONOCYTES NFR BLD AUTO: 11.9 % (ref 5–12)
NEUTROPHILS NFR BLD AUTO: 5.6 10*3/MM3 (ref 1.7–7)
NEUTROPHILS NFR BLD AUTO: 59.6 % (ref 42.7–76)
NRBC BLD AUTO-RTO: 0 /100 WBC (ref 0–0.2)
PLATELET # BLD AUTO: 204 10*3/MM3 (ref 140–450)
PMV BLD AUTO: 10 FL (ref 6–12)
POTASSIUM SERPL-SCNC: 3.5 MMOL/L (ref 3.5–5.2)
RBC # BLD AUTO: 4.55 10*6/MM3 (ref 4.14–5.8)
SODIUM SERPL-SCNC: 138 MMOL/L (ref 136–145)
WBC NRBC COR # BLD: 9.39 10*3/MM3 (ref 3.4–10.8)

## 2022-10-20 PROCEDURE — 80048 BASIC METABOLIC PNL TOTAL CA: CPT | Performed by: ANESTHESIOLOGY

## 2022-10-20 PROCEDURE — 85025 COMPLETE CBC W/AUTO DIFF WBC: CPT | Performed by: ANESTHESIOLOGY

## 2022-10-20 PROCEDURE — 49422 REMOVE TUNNELED IP CATH: CPT | Performed by: SURGERY

## 2022-10-20 PROCEDURE — 25010000002 PROPOFOL 10 MG/ML EMULSION: Performed by: NURSE ANESTHETIST, CERTIFIED REGISTERED

## 2022-10-20 PROCEDURE — 25010000002 MIDAZOLAM PER 1 MG: Performed by: ANESTHESIOLOGY

## 2022-10-20 PROCEDURE — 25010000002 FENTANYL CITRATE (PF) 50 MCG/ML SOLUTION: Performed by: NURSE ANESTHETIST, CERTIFIED REGISTERED

## 2022-10-20 PROCEDURE — 25010000002 ONDANSETRON PER 1 MG: Performed by: NURSE ANESTHETIST, CERTIFIED REGISTERED

## 2022-10-20 RX ORDER — ONDANSETRON 2 MG/ML
4 INJECTION INTRAMUSCULAR; INTRAVENOUS ONCE AS NEEDED
Status: DISCONTINUED | OUTPATIENT
Start: 2022-10-20 | End: 2022-10-20 | Stop reason: HOSPADM

## 2022-10-20 RX ORDER — SODIUM CHLORIDE, SODIUM LACTATE, POTASSIUM CHLORIDE, CALCIUM CHLORIDE 600; 310; 30; 20 MG/100ML; MG/100ML; MG/100ML; MG/100ML
70 INJECTION, SOLUTION INTRAVENOUS CONTINUOUS
Status: DISCONTINUED | OUTPATIENT
Start: 2022-10-20 | End: 2022-10-20 | Stop reason: HOSPADM

## 2022-10-20 RX ORDER — ACETAMINOPHEN 500 MG
1000 TABLET ORAL ONCE
Status: COMPLETED | OUTPATIENT
Start: 2022-10-20 | End: 2022-10-20

## 2022-10-20 RX ORDER — SODIUM CHLORIDE 9 MG/ML
9 INJECTION, SOLUTION INTRAVENOUS CONTINUOUS PRN
Status: DISCONTINUED | OUTPATIENT
Start: 2022-10-20 | End: 2022-10-20 | Stop reason: HOSPADM

## 2022-10-20 RX ORDER — ONDANSETRON 2 MG/ML
4 INJECTION INTRAMUSCULAR; INTRAVENOUS EVERY 6 HOURS PRN
Status: DISCONTINUED | OUTPATIENT
Start: 2022-10-20 | End: 2022-10-20 | Stop reason: HOSPADM

## 2022-10-20 RX ORDER — MIDAZOLAM HYDROCHLORIDE 1 MG/ML
2 INJECTION INTRAMUSCULAR; INTRAVENOUS ONCE
Status: COMPLETED | OUTPATIENT
Start: 2022-10-20 | End: 2022-10-20

## 2022-10-20 RX ORDER — HYDROCODONE BITARTRATE AND ACETAMINOPHEN 5; 325 MG/1; MG/1
1 TABLET ORAL ONCE AS NEEDED
Status: DISCONTINUED | OUTPATIENT
Start: 2022-10-20 | End: 2022-10-20 | Stop reason: HOSPADM

## 2022-10-20 RX ORDER — MAGNESIUM HYDROXIDE 1200 MG/15ML
LIQUID ORAL AS NEEDED
Status: DISCONTINUED | OUTPATIENT
Start: 2022-10-20 | End: 2022-10-20 | Stop reason: HOSPADM

## 2022-10-20 RX ORDER — IBUPROFEN 600 MG/1
600 TABLET ORAL EVERY 6 HOURS PRN
Status: DISCONTINUED | OUTPATIENT
Start: 2022-10-20 | End: 2022-10-20 | Stop reason: HOSPADM

## 2022-10-20 RX ORDER — ONDANSETRON 2 MG/ML
INJECTION INTRAMUSCULAR; INTRAVENOUS AS NEEDED
Status: DISCONTINUED | OUTPATIENT
Start: 2022-10-20 | End: 2022-10-20 | Stop reason: SURG

## 2022-10-20 RX ORDER — FENTANYL CITRATE 50 UG/ML
INJECTION, SOLUTION INTRAMUSCULAR; INTRAVENOUS AS NEEDED
Status: DISCONTINUED | OUTPATIENT
Start: 2022-10-20 | End: 2022-10-20 | Stop reason: SURG

## 2022-10-20 RX ORDER — OXYCODONE HYDROCHLORIDE 5 MG/1
5 TABLET ORAL
Status: DISCONTINUED | OUTPATIENT
Start: 2022-10-20 | End: 2022-10-20 | Stop reason: HOSPADM

## 2022-10-20 RX ORDER — HYDROCODONE BITARTRATE AND ACETAMINOPHEN 5; 325 MG/1; MG/1
1-2 TABLET ORAL EVERY 4 HOURS PRN
Qty: 10 TABLET | Refills: 0 | Status: ON HOLD | OUTPATIENT
Start: 2022-10-20 | End: 2022-10-24 | Stop reason: SDUPTHER

## 2022-10-20 RX ORDER — GLYCOPYRROLATE 0.2 MG/ML
0.2 INJECTION INTRAMUSCULAR; INTRAVENOUS
Status: COMPLETED | OUTPATIENT
Start: 2022-10-20 | End: 2022-10-20

## 2022-10-20 RX ORDER — ONDANSETRON 4 MG/1
4 TABLET, FILM COATED ORAL ONCE AS NEEDED
Status: DISCONTINUED | OUTPATIENT
Start: 2022-10-20 | End: 2022-10-20 | Stop reason: HOSPADM

## 2022-10-20 RX ORDER — BUPIVACAINE HYDROCHLORIDE AND EPINEPHRINE 2.5; 5 MG/ML; UG/ML
INJECTION, SOLUTION EPIDURAL; INFILTRATION; INTRACAUDAL; PERINEURAL AS NEEDED
Status: DISCONTINUED | OUTPATIENT
Start: 2022-10-20 | End: 2022-10-20 | Stop reason: HOSPADM

## 2022-10-20 RX ORDER — LIDOCAINE HYDROCHLORIDE 20 MG/ML
INJECTION, SOLUTION EPIDURAL; INFILTRATION; INTRACAUDAL; PERINEURAL AS NEEDED
Status: DISCONTINUED | OUTPATIENT
Start: 2022-10-20 | End: 2022-10-20 | Stop reason: SURG

## 2022-10-20 RX ORDER — PROPOFOL 10 MG/ML
VIAL (ML) INTRAVENOUS AS NEEDED
Status: DISCONTINUED | OUTPATIENT
Start: 2022-10-20 | End: 2022-10-20 | Stop reason: SURG

## 2022-10-20 RX ADMIN — MIDAZOLAM 2 MG: 1 INJECTION INTRAMUSCULAR; INTRAVENOUS at 08:26

## 2022-10-20 RX ADMIN — GLYCOPYRROLATE 0.2 MG: 0.2 INJECTION INTRAMUSCULAR; INTRAVENOUS at 08:26

## 2022-10-20 RX ADMIN — ACETAMINOPHEN 1000 MG: 500 TABLET, FILM COATED ORAL at 08:03

## 2022-10-20 RX ADMIN — FENTANYL CITRATE 25 MCG: 50 INJECTION, SOLUTION INTRAMUSCULAR; INTRAVENOUS at 08:45

## 2022-10-20 RX ADMIN — SODIUM CHLORIDE 9 ML/HR: 9 INJECTION, SOLUTION INTRAVENOUS at 08:04

## 2022-10-20 RX ADMIN — PROPOFOL 200 MG: 10 INJECTION, EMULSION INTRAVENOUS at 08:37

## 2022-10-20 RX ADMIN — ONDANSETRON 4 MG: 2 INJECTION INTRAMUSCULAR; INTRAVENOUS at 08:39

## 2022-10-20 RX ADMIN — FENTANYL CITRATE 25 MCG: 50 INJECTION, SOLUTION INTRAMUSCULAR; INTRAVENOUS at 08:44

## 2022-10-20 RX ADMIN — FENTANYL CITRATE 25 MCG: 50 INJECTION, SOLUTION INTRAMUSCULAR; INTRAVENOUS at 09:08

## 2022-10-20 RX ADMIN — PROPOFOL 225 MCG/KG/MIN: 10 INJECTION, EMULSION INTRAVENOUS at 08:39

## 2022-10-20 RX ADMIN — FENTANYL CITRATE 25 MCG: 50 INJECTION, SOLUTION INTRAMUSCULAR; INTRAVENOUS at 09:03

## 2022-10-20 RX ADMIN — LIDOCAINE HYDROCHLORIDE 60 MG: 20 INJECTION, SOLUTION EPIDURAL; INFILTRATION; INTRACAUDAL; PERINEURAL at 08:37

## 2022-10-20 NOTE — DISCHARGE INSTRUCTIONS
DISCHARGE INSTRUCTIONS  SURGICAL / AMBULATORY  PROCEDURES      For your surgery you had:  General anesthesia (you may have a sore throat for the first 24 hours)  You may experience dizziness, drowsiness, or light-headedness for several hours following surgery/procedure.  Do not stay alone today or tonight.  Limit your activity for 24 hours.  Resume your diet slowly.  Follow whatever special dietary instructions you may have been given by your doctor.  You should not drive or operate machinery, drink alcohol, or sign legally binding documents for 24 hours or while you are taking pain medication.  NOTIFY YOUR DOCTOR IF YOU EXPERIENCE ANY OF THE FOLLOWING:  Temperature greater than 101 degrees Fahrenheit  Shaking Chills  Redness or excessive drainage from incision  Nausea, vomiting and/or pain that is not controlled by prescribed medications  Increase in bleeding or bleeding that is excessive  Unable to urinate in 6 hours after surgery  If unable to reach your doctor, please go to the closest Emergency Room  You may begin dressing changes:     [] Saturday  You may shower or bathe Saturday  .  Apply an ice pack 24-48 hours.  Medications per physician instructions as indicated on Discharge Medication Information Sheet.  SPECIAL INSTRUCTIONS:

## 2022-10-20 NOTE — NURSING NOTE
Pt advised that someone was coming to pick him up, that he lived alone, but would have someone call/visit to check on him throughout the day.  I advised to make sure someone would check on him and he understood and agreed.

## 2022-10-20 NOTE — ANESTHESIA POSTPROCEDURE EVALUATION
Patient: Radha Hutchison    Procedure Summary     Date: 10/20/22 Room / Location: [facility] OR  / [facility]    Anesthesia Start: 0832 Anesthesia Stop: 0919    Procedure: REMOVAL PERITONEAL DIALYSIS CATHETER (Abdomen) Diagnosis:       Peritoneal dialysis catheter in place (HCC)      (Peritoneal dialysis catheter in place (HCC) [Z99.2])    Surgeons: Matthew Arshad MD Provider: Radha Bedoya MD    Anesthesia Type: general ASA Status: 4          Anesthesia Type: general    Vitals  Vitals Value Taken Time   /103 10/20/22 1004   Temp 36.1 °C (96.9 °F) 10/20/22 0920   Pulse 70 10/20/22 1006   Resp 22 10/20/22 0955   SpO2 94 % 10/20/22 1006   Vitals shown include unvalidated device data.        Post Anesthesia Care and Evaluation    Patient location during evaluation: bedside  Patient participation: complete - patient participated  Level of consciousness: awake  Pain management: adequate    Airway patency: patent  Anesthetic complications: No anesthetic complications  PONV Status: none  Cardiovascular status: acceptable and stable  Respiratory status: acceptable  Hydration status: acceptable    Comments: An Anesthesiologist personally participated in the most demanding procedures (including induction and emergence if applicable) in the anesthesia plan, monitored the course of anesthesia administration at frequent intervals and remained physically present and available for immediate diagnosis and treatment of emergencies.

## 2022-10-20 NOTE — OP NOTE
REMOVAL PERITONEAL DIALYSIS CATHETER  Procedure Report    Patient Name:  Radha Hutchison  YOB: 1970    Date of Surgery:  10/20/2022     Indications: The patient is a 52-year-old gentleman that has a peritoneal dialysis catheter in place.  He is being converted over to hemodialysis and the decision was made to proceed with a removal of this catheter.    Pre-op Diagnosis: #1 end-stage renal disease #2 PD catheter in place    Post-Op Diagnosis: Same    Procedure/CPT® Codes:    PD catheter removal    Staff:  Surgeon(s):  Matthew Arshad MD    Assistant: Isidro Lee    Anesthesia: Monitored Anesthesia Care    Estimated Blood Loss: 5 mL    Implants:    Nothing was implanted during the procedure    Specimen:          None        Findings: None    Complications: None    Description of Procedure: The patient was taken the operating room and placed on the table in supine position.  After induction of MAC anesthesia, the abdomen was prepped and draped sterilely.  We anesthetized the skin and subcutaneous tissues inferior to the PD catheter exit site with quarter percent Marcaine.  An incision was made going inferiorly from the catheter exit site over the course of the catheter tract using a 15 blade scalpel.  I dissected down into the subcutaneous tissues and dissected along the catheter tract.  I identified both Velcro cuffs and dissected these free and then I was able to remove the catheter from the abdomen without difficulty.  Adequate hemostasis was achieved with cautery.  I then closed the peritoneal defect with a figure-of-eight 0 Vicryl suture.  The skin was then closed with interrupted 3-0 nylon sutures.  Sterile dressings were applied and he was taken the postanesthesia recovery room in stable condition.    Assistant: Isidro Lee  was responsible for performing the following activities: Retraction and Placing Dressing and their skilled assistance was necessary for the success of this  case.    Matthew Arshad MD     Date: 10/20/2022  Time: 09:12 EDT

## 2022-10-20 NOTE — ANESTHESIA PREPROCEDURE EVALUATION
Anesthesia Evaluation     Patient summary reviewed and Nursing notes reviewed   no history of anesthetic complications:  NPO Solid Status: > 8 hours  NPO Liquid Status: > 2 hours           Airway   Mallampati: II  TM distance: >3 FB  Neck ROM: full  No difficulty expected  Dental      Pulmonary - normal exam    breath sounds clear to auscultation  (+) a smoker Former,   Cardiovascular - normal exam  Exercise tolerance: good (4-7 METS)    ECG reviewed  Patient on routine beta blocker and Beta blocker given within 24 hours of surgery  Rhythm: regular  Rate: normal    (+) hypertension, CAD, CHF Diastolic >=55%,       Neuro/Psych- negative ROS  GI/Hepatic/Renal/Endo    (+)   renal disease ESRD,     Musculoskeletal (-) negative ROS    Abdominal    Substance History - negative use     OB/GYN negative ob/gyn ROS         Other - negative ROS       ROS/Med Hx Other: >4METS, HX CHF, CAD, ESRD (HD 3XW). STRESS 9/22 EF 54%, LOW RISK STUDY. ECHO 9/14/22 NO VALVE STENOSIS. KT                   Anesthesia Plan    ASA 4     general     (Patient understands anesthesia not responsible for dental damage.)  intravenous induction     Anesthetic plan, risks, benefits, and alternatives have been provided, discussed and informed consent has been obtained with: patient.    Use of blood products discussed with patient .   Plan discussed with CRNA.        CODE STATUS:

## 2022-10-24 ENCOUNTER — APPOINTMENT (OUTPATIENT)
Dept: GENERAL RADIOLOGY | Facility: HOSPITAL | Age: 52
End: 2022-10-24

## 2022-10-24 ENCOUNTER — HOSPITAL ENCOUNTER (OUTPATIENT)
Facility: HOSPITAL | Age: 52
Setting detail: HOSPITAL OUTPATIENT SURGERY
Discharge: HOME | End: 2022-10-24
Attending: SURGERY | Admitting: SURGERY

## 2022-10-24 ENCOUNTER — ANESTHESIA (OUTPATIENT)
Dept: PERIOP | Facility: HOSPITAL | Age: 52
End: 2022-10-24

## 2022-10-24 ENCOUNTER — ANESTHESIA EVENT (OUTPATIENT)
Dept: PERIOP | Facility: HOSPITAL | Age: 52
End: 2022-10-24

## 2022-10-24 ENCOUNTER — HOSPITAL ENCOUNTER (OUTPATIENT)
Facility: HOSPITAL | Age: 52
Setting detail: HOSPITAL OUTPATIENT SURGERY
End: 2022-10-24
Attending: SURGERY | Admitting: SURGERY

## 2022-10-24 VITALS
TEMPERATURE: 98 F | SYSTOLIC BLOOD PRESSURE: 149 MMHG | DIASTOLIC BLOOD PRESSURE: 99 MMHG | WEIGHT: 174.38 LBS | HEART RATE: 59 BPM | HEIGHT: 69 IN | BODY MASS INDEX: 25.83 KG/M2 | RESPIRATION RATE: 18 BRPM | OXYGEN SATURATION: 99 %

## 2022-10-24 DIAGNOSIS — N18.9 CHRONIC KIDNEY DISEASE, UNSPECIFIED CKD STAGE: ICD-10-CM

## 2022-10-24 DIAGNOSIS — Z99.2 END STAGE RENAL FAILURE ON DIALYSIS: Primary | ICD-10-CM

## 2022-10-24 DIAGNOSIS — N18.6 END STAGE RENAL FAILURE ON DIALYSIS: Primary | ICD-10-CM

## 2022-10-24 DIAGNOSIS — N18.9 CHRONIC KIDNEY DISEASE, UNSPECIFIED CKD STAGE: Primary | ICD-10-CM

## 2022-10-24 LAB
ANION GAP SERPL CALCULATED.3IONS-SCNC: 10 MMOL/L (ref 5–15)
BUN SERPL-MCNC: 33 MG/DL (ref 6–20)
BUN/CREAT SERPL: 11 (ref 7–25)
CALCIUM SPEC-SCNC: 9.8 MG/DL (ref 8.6–10.5)
CHLORIDE SERPL-SCNC: 103 MMOL/L (ref 98–107)
CO2 SERPL-SCNC: 24 MMOL/L (ref 22–29)
CREAT SERPL-MCNC: 3.01 MG/DL (ref 0.76–1.27)
DEPRECATED RDW RBC AUTO: 43.8 FL (ref 37–54)
EGFRCR SERPLBLD CKD-EPI 2021: 24.4 ML/MIN/1.73
ERYTHROCYTE [DISTWIDTH] IN BLOOD BY AUTOMATED COUNT: 14.3 % (ref 12.3–15.4)
GLUCOSE SERPL-MCNC: 102 MG/DL (ref 65–99)
HCT VFR BLD AUTO: 40.4 % (ref 37.5–51)
HGB BLD-MCNC: 13.7 G/DL (ref 13–17.7)
INR PPP: 1.07 (ref 0.86–1.15)
MCH RBC QN AUTO: 28.6 PG (ref 26.6–33)
MCHC RBC AUTO-ENTMCNC: 33.9 G/DL (ref 31.5–35.7)
MCV RBC AUTO: 84.3 FL (ref 79–97)
PLATELET # BLD AUTO: 240 10*3/MM3 (ref 140–450)
PMV BLD AUTO: 10.2 FL (ref 6–12)
POTASSIUM SERPL-SCNC: 4.4 MMOL/L (ref 3.5–5.2)
PROTHROMBIN TIME: 14.1 SECONDS (ref 11.8–14.9)
RBC # BLD AUTO: 4.79 10*6/MM3 (ref 4.14–5.8)
SODIUM SERPL-SCNC: 137 MMOL/L (ref 136–145)
WBC NRBC COR # BLD: 8.73 10*3/MM3 (ref 3.4–10.8)

## 2022-10-24 PROCEDURE — S0260 H&P FOR SURGERY: HCPCS | Performed by: SURGERY

## 2022-10-24 PROCEDURE — 25010000002 METOCLOPRAMIDE PER 10 MG: Performed by: NURSE ANESTHETIST, CERTIFIED REGISTERED

## 2022-10-24 PROCEDURE — 25010000002 PROPOFOL 10 MG/ML EMULSION: Performed by: NURSE ANESTHETIST, CERTIFIED REGISTERED

## 2022-10-24 PROCEDURE — 85027 COMPLETE CBC AUTOMATED: CPT | Performed by: SURGERY

## 2022-10-24 PROCEDURE — 25010000002 HEPARIN (PORCINE) PER 1000 UNITS: Performed by: SURGERY

## 2022-10-24 PROCEDURE — 71045 X-RAY EXAM CHEST 1 VIEW: CPT

## 2022-10-24 PROCEDURE — 77001 FLUOROGUIDE FOR VEIN DEVICE: CPT

## 2022-10-24 PROCEDURE — 77001 FLUOROGUIDE FOR VEIN DEVICE: CPT | Performed by: SURGERY

## 2022-10-24 PROCEDURE — 25010000002 ONDANSETRON PER 1 MG: Performed by: NURSE ANESTHETIST, CERTIFIED REGISTERED

## 2022-10-24 PROCEDURE — 76000 FLUOROSCOPY <1 HR PHYS/QHP: CPT

## 2022-10-24 PROCEDURE — C1750 CATH, HEMODIALYSIS,LONG-TERM: HCPCS | Performed by: SURGERY

## 2022-10-24 PROCEDURE — 0 LIDOCAINE 1 % SOLUTION 10 ML VIAL: Performed by: SURGERY

## 2022-10-24 PROCEDURE — 85610 PROTHROMBIN TIME: CPT | Performed by: SURGERY

## 2022-10-24 PROCEDURE — 36581 REPLACE TUNNELED CV CATH: CPT | Performed by: SURGERY

## 2022-10-24 PROCEDURE — 80048 BASIC METABOLIC PNL TOTAL CA: CPT | Performed by: SURGERY

## 2022-10-24 PROCEDURE — 25010000002 FENTANYL CITRATE (PF) 50 MCG/ML SOLUTION: Performed by: NURSE ANESTHETIST, CERTIFIED REGISTERED

## 2022-10-24 PROCEDURE — 25010000002 CEFAZOLIN PER 500 MG: Performed by: NURSE ANESTHETIST, CERTIFIED REGISTERED

## 2022-10-24 RX ORDER — SODIUM CHLORIDE 9 MG/ML
INJECTION, SOLUTION INTRAVENOUS CONTINUOUS PRN
Status: DISCONTINUED | OUTPATIENT
Start: 2022-10-24 | End: 2022-10-24 | Stop reason: SURG

## 2022-10-24 RX ORDER — HEPARIN SODIUM 1000 [USP'U]/ML
INJECTION, SOLUTION INTRAVENOUS; SUBCUTANEOUS AS NEEDED
Status: DISCONTINUED | OUTPATIENT
Start: 2022-10-24 | End: 2022-10-24 | Stop reason: HOSPADM

## 2022-10-24 RX ORDER — LIDOCAINE HYDROCHLORIDE 20 MG/ML
INJECTION, SOLUTION EPIDURAL; INFILTRATION; INTRACAUDAL; PERINEURAL AS NEEDED
Status: DISCONTINUED | OUTPATIENT
Start: 2022-10-24 | End: 2022-10-24 | Stop reason: SURG

## 2022-10-24 RX ORDER — HYDROCODONE BITARTRATE AND ACETAMINOPHEN 5; 325 MG/1; MG/1
1 TABLET ORAL EVERY 6 HOURS PRN
Qty: 12 TABLET | Refills: 0 | Status: SHIPPED | OUTPATIENT
Start: 2022-10-24 | End: 2022-11-07

## 2022-10-24 RX ORDER — ONDANSETRON 2 MG/ML
4 INJECTION INTRAMUSCULAR; INTRAVENOUS ONCE AS NEEDED
Status: DISCONTINUED | OUTPATIENT
Start: 2022-10-24 | End: 2022-10-24 | Stop reason: HOSPADM

## 2022-10-24 RX ORDER — PROMETHAZINE HYDROCHLORIDE 12.5 MG/1
25 TABLET ORAL ONCE AS NEEDED
Status: DISCONTINUED | OUTPATIENT
Start: 2022-10-24 | End: 2022-10-24 | Stop reason: HOSPADM

## 2022-10-24 RX ORDER — HYDROCODONE BITARTRATE AND ACETAMINOPHEN 5; 325 MG/1; MG/1
1 TABLET ORAL EVERY 4 HOURS PRN
Status: DISCONTINUED | OUTPATIENT
Start: 2022-10-24 | End: 2022-10-24 | Stop reason: HOSPADM

## 2022-10-24 RX ORDER — SODIUM CHLORIDE, SODIUM LACTATE, POTASSIUM CHLORIDE, CALCIUM CHLORIDE 600; 310; 30; 20 MG/100ML; MG/100ML; MG/100ML; MG/100ML
9 INJECTION, SOLUTION INTRAVENOUS CONTINUOUS PRN
Status: DISCONTINUED | OUTPATIENT
Start: 2022-10-24 | End: 2022-10-24 | Stop reason: HOSPADM

## 2022-10-24 RX ORDER — CEFAZOLIN SODIUM 1 G/3ML
INJECTION, POWDER, FOR SOLUTION INTRAMUSCULAR; INTRAVENOUS AS NEEDED
Status: DISCONTINUED | OUTPATIENT
Start: 2022-10-24 | End: 2022-10-24 | Stop reason: SURG

## 2022-10-24 RX ORDER — PROPOFOL 10 MG/ML
VIAL (ML) INTRAVENOUS AS NEEDED
Status: DISCONTINUED | OUTPATIENT
Start: 2022-10-24 | End: 2022-10-24 | Stop reason: SURG

## 2022-10-24 RX ORDER — PROMETHAZINE HYDROCHLORIDE 25 MG/1
25 SUPPOSITORY RECTAL ONCE AS NEEDED
Status: DISCONTINUED | OUTPATIENT
Start: 2022-10-24 | End: 2022-10-24 | Stop reason: HOSPADM

## 2022-10-24 RX ORDER — KETAMINE HCL IN NACL, ISO-OSM 100MG/10ML
SYRINGE (ML) INJECTION AS NEEDED
Status: DISCONTINUED | OUTPATIENT
Start: 2022-10-24 | End: 2022-10-24 | Stop reason: SURG

## 2022-10-24 RX ORDER — ONDANSETRON 2 MG/ML
INJECTION INTRAMUSCULAR; INTRAVENOUS AS NEEDED
Status: DISCONTINUED | OUTPATIENT
Start: 2022-10-24 | End: 2022-10-24 | Stop reason: SURG

## 2022-10-24 RX ORDER — MIDAZOLAM IN NACL, ISO-OSMOTIC 5 MG/5 ML
2 SYRINGE (ML) INJECTION ONCE
Status: COMPLETED | OUTPATIENT
Start: 2022-10-24 | End: 2022-10-24

## 2022-10-24 RX ORDER — GLYCOPYRROLATE 0.2 MG/ML
0.2 INJECTION INTRAMUSCULAR; INTRAVENOUS
Status: COMPLETED | OUTPATIENT
Start: 2022-10-24 | End: 2022-10-24

## 2022-10-24 RX ORDER — METOCLOPRAMIDE HYDROCHLORIDE 5 MG/ML
INJECTION INTRAMUSCULAR; INTRAVENOUS AS NEEDED
Status: DISCONTINUED | OUTPATIENT
Start: 2022-10-24 | End: 2022-10-24 | Stop reason: SURG

## 2022-10-24 RX ORDER — ACETAMINOPHEN 325 MG/1
650 TABLET ORAL EVERY 4 HOURS PRN
Status: DISCONTINUED | OUTPATIENT
Start: 2022-10-24 | End: 2022-10-24 | Stop reason: HOSPADM

## 2022-10-24 RX ORDER — FENTANYL CITRATE 50 UG/ML
25 INJECTION, SOLUTION INTRAMUSCULAR; INTRAVENOUS
Status: DISCONTINUED | OUTPATIENT
Start: 2022-10-24 | End: 2022-10-24 | Stop reason: HOSPADM

## 2022-10-24 RX ADMIN — SODIUM CHLORIDE: 9 INJECTION, SOLUTION INTRAVENOUS at 12:22

## 2022-10-24 RX ADMIN — FENTANYL CITRATE 25 MCG: 50 INJECTION, SOLUTION INTRAMUSCULAR; INTRAVENOUS at 14:18

## 2022-10-24 RX ADMIN — Medication 10 MG: at 12:36

## 2022-10-24 RX ADMIN — Medication 20 MG: at 12:44

## 2022-10-24 RX ADMIN — METOCLOPRAMIDE HYDROCHLORIDE 10 MG: 5 INJECTION INTRAMUSCULAR; INTRAVENOUS at 12:59

## 2022-10-24 RX ADMIN — GLYCOPYRROLATE 0.2 MG: 0.2 INJECTION INTRAMUSCULAR; INTRAVENOUS at 12:16

## 2022-10-24 RX ADMIN — ONDANSETRON 4 MG: 2 INJECTION INTRAMUSCULAR; INTRAVENOUS at 12:45

## 2022-10-24 RX ADMIN — CEFAZOLIN SODIUM 2 G: 1 INJECTION, POWDER, FOR SOLUTION INTRAMUSCULAR; INTRAVENOUS at 12:32

## 2022-10-24 RX ADMIN — Medication 2 MG: at 12:17

## 2022-10-24 RX ADMIN — Medication 20 MG: at 12:26

## 2022-10-24 RX ADMIN — PROPOFOL 100 MG: 10 INJECTION, EMULSION INTRAVENOUS at 12:26

## 2022-10-24 RX ADMIN — LIDOCAINE HYDROCHLORIDE 80 MG: 20 INJECTION, SOLUTION EPIDURAL; INFILTRATION; INTRACAUDAL; PERINEURAL at 12:26

## 2022-10-24 RX ADMIN — PROPOFOL 250 MCG/KG/MIN: 10 INJECTION, EMULSION INTRAVENOUS at 12:28

## 2022-10-24 NOTE — OP NOTE
Procedure Report    Patient Name:  Radha Hutchison  YOB: 1970    Date of Surgery:  10/24/2022     Indications: Please refer to dictated history and physical for details.    Pre-op Diagnosis:   End-stage renal disease with need for replacement of dysfunctional tunneled dialysis catheter     Post-op Diagnosis:  Same    Surgeon:  Mikey Perez MD    Procedure:  Replacement of right internal jugular tunneled dialysis catheter over guidewire with 23 cm HemoSplit catheter from tip to cuff under ultrasound and fluoroscopic guidance.      Anesthesia: GETA    Estimated Blood Loss: 10 ml    Specimen: None       Findings: Adequate placement of tunneled dialysis catheter under fluoroscopic guidance with excellent aspiration and flushing of the ports.      Complications: None    Description of Procedure: Patient was brought back to the operating room placed on the operating table in supine position.  Upon induction of general endotracheal anesthesia and administration of appropriate antibiotic therapy bilateral chest neck and shoulders were prepped and draped in normal sterile fashion.  After appropriate timeout was performed an incision was created using a #15 blade scalpel at the level of the neck overlying the previously placed tunneled dialysis catheter.  Local anesthetic had been injected to this area as well as along the proposed course of the catheter along chest wall down to the previous incision.  Patient was placed in Trendelenburg position and the previously tunneled hemodialysis catheter was dissected out circumferentially and elevated out of the neck incision and clamped with a hemostat.  The catheter was cut and the distal portion elevated out of the neck making sure to keep control at all times.  The J-wire was then passed down the vena cava and into the heart as evidenced by some mild ectopy on the monitor.  This was resolved by pulling the wire back slightly and a fluoroscopic image was also  taken to confirm.   The course of the previous catheter was noted to be fine without any kinks or twists but a previous 19 cm in length catheter had been used and it sat in the distal SVC as such I decided to use a 23 cm from tip to cuff HemoSplit catheter to extend into the atriocaval junction and hopefully avoid any future dysfunction.  The distal portion of the previous catheter was removed and the proximal portion dissected away from the chest wall removing the cuff and the proximal portion of the catheter after sutures had been cut.  The 23cm HemoSplit catheter was brought onto the operative field and had been flushed and connected to the tunneler.  This was used to tunnel the catheter along with the proposed course and was brought out posterior to the J-wire at the level of the neck with cuff beneath the skin at the level of the right chest wall.  Finally under direct fluoroscopic visualization the large sheath was passed over the wire making sure the wire was easily mobile throughout its passage and sheath was noted passed down to the level of the right side of the heart.  The inner portion of the sheath as well as the wire was then removed and a finger was used to occlude the hub.  At this point the HemoSplit catheter was then passed through the sheath down to the level of the atriocaval junction and the external sheath was then torn away making sure to maintain the catheter beneath the level of the skin with continuous forward pressure.   Once the entirety of the external sheath has been removed a fluoroscopy shot was obtained showing evidence of an adequate course of the tunneled dialysis catheter and adequate placement at the level of the atriocaval junction.  The right neck incision was then closed with a 4-0 Monocryl subcuticular suture being careful not to stick the line.  A Biopatch was placed at the level the right chest wall incision and the catheter was secured with 3-0 nylon suture.  Both ports  aspirated and flushed quite easily with no evidence of kinking or obstruction.  The appropriate amount of high-dose heparin was used to prep the line itself and end caps were placed.  Finally dressings in the form of 4 x 4 and Tegaderm over the right chest wall incision and Dermabond at the level of the right neck incision were placed and the procedure was completed.  Please note that the patient tolerated the procedure well with no complications or difficulties and was taken back to the postanesthesia care unit in stable condition.      Mikey Perez MD     Date: 10/24/2022  Time: 13:35 EDT

## 2022-10-24 NOTE — ANESTHESIA PREPROCEDURE EVALUATION
Anesthesia Evaluation     Patient summary reviewed and Nursing notes reviewed   no history of anesthetic complications:  NPO Solid Status: > 8 hours  NPO Liquid Status: > 2 hours           Airway   Mallampati: I  TM distance: >3 FB  Neck ROM: full  No difficulty expected  Dental - normal exam     Pulmonary - normal exam    breath sounds clear to auscultation  (+) a smoker Former,   Cardiovascular - normal exam  Exercise tolerance: good (4-7 METS)    ECG reviewed  Patient on routine beta blocker and Beta blocker given within 24 hours of surgery  Rhythm: regular  Rate: normal    (+) hypertension 2 medications or greater, CAD, CHF Diastolic >=55%,     ROS comment: Sinus rhythm  Consider left ventricular hypertrophy    borderline concentric hypertrophy.  Left ventricular ejection fraction appears to be 56 - 60%.  Left ventricular diastolic function was normal.  No significant valvular disease.  Borderline left atrial enlargement.      Neuro/Psych- negative ROS  GI/Hepatic/Renal/Endo    (+)  GERD,  renal disease (last HD Friday; dialysis for a year) ESRD and dialysis,     Musculoskeletal (-) negative ROS    Abdominal    Substance History - negative use     OB/GYN negative ob/gyn ROS         Other - negative ROS       ROS/Med Hx Other: PAT Nursing Notes unavailable.                   Anesthesia Plan    ASA 4     general     (Patient understands anesthesia not responsible for dental damage.)  intravenous induction     Anesthetic plan, risks, benefits, and alternatives have been provided, discussed and informed consent has been obtained with: patient.  Pre-procedure education provided  Use of blood products discussed with patient  Consented to blood products.   Plan discussed with CRNA.        CODE STATUS:

## 2022-10-24 NOTE — ANESTHESIA POSTPROCEDURE EVALUATION
Patient: Radha Hutchison    Procedure Summary     Date: 10/24/22 Room / Location: [facility] OR 02 / [facility] OR    Anesthesia Start: 1222 Anesthesia Stop: 1335    Procedure: HEMODIALYSIS CATHETER INSERTION exchange over guidewire. Diagnosis:       End stage renal failure on dialysis (HCC)      (End stage renal failure on dialysis (HCC) [N18.6, Z99.2])    Surgeons: Mikey Perez MD Provider: Reyes, Mirabelle, DO    Anesthesia Type: general ASA Status: 4          Anesthesia Type: general    Vitals  Vitals Value Taken Time   /93 10/24/22 1454   Temp 36.7 °C (98 °F) 10/24/22 1450   Pulse 56 10/24/22 1456   Resp 20 10/24/22 1450   SpO2 97 % 10/24/22 1456   Vitals shown include unvalidated device data.        Post Anesthesia Care and Evaluation    Patient location during evaluation: bedside  Patient participation: complete - patient participated  Level of consciousness: awake  Pain management: adequate    Airway patency: patent  Anesthetic complications: No anesthetic complications  PONV Status: none  Cardiovascular status: acceptable and stable  Respiratory status: acceptable  Hydration status: acceptable    Comments: An Anesthesiologist personally participated in the most demanding procedures (including induction and emergence if applicable) in the anesthesia plan, monitored the course of anesthesia administration at frequent intervals and remained physically present and available for immediate diagnosis and treatment of emergencies.

## 2022-10-24 NOTE — DISCHARGE INSTRUCTIONS
No pushing pulling greater than 10-15 lbs until cleared.  May shower and gently wash over access and dab dry.  Call for any issues of severe pain, swelling or numbness to extremity.  May DC patient to home this afternoon.  Patient may shower and wash gently over incision with soap and water.  Patient may dab the incision dry.  If dressing in place this can be removed after 24 to 48 hours and a Band-Aid placed over the opening if necessary.  Call for any significant pain to the incision, severe redness or swelling or purulent drainage.  No driving while patient is on narcotic medications.  May call the office at [phone] for any questions or concerns.

## 2022-11-04 ENCOUNTER — OFFICE VISIT (OUTPATIENT)
Dept: SURGERY | Facility: CLINIC | Age: 52
End: 2022-11-04

## 2022-11-04 VITALS — RESPIRATION RATE: 18 BRPM | WEIGHT: 182 LBS | BODY MASS INDEX: 26.96 KG/M2 | HEIGHT: 69 IN

## 2022-11-04 DIAGNOSIS — N18.6 END STAGE RENAL FAILURE ON DIALYSIS: Primary | ICD-10-CM

## 2022-11-04 DIAGNOSIS — Z99.2 END STAGE RENAL FAILURE ON DIALYSIS: Primary | ICD-10-CM

## 2022-11-04 PROCEDURE — 99212 OFFICE O/P EST SF 10 MIN: CPT | Performed by: SURGERY

## 2022-11-04 NOTE — PROGRESS NOTES
Chief Complaint  PD Cath Removal and Post-op    Subjective          Radha Hutchison presents to [facility] GENERAL SURGERY  History of Present Illness    Radha Hutchison is a 52 y.o. male  who presents today for a postoperative visit.     Patient is here for a follow-up after a removal of a peritoneal dialysis catheter.  He is doing well and had no complaints today.    Past History:  Medical History: has a past medical history of CHF (congestive heart failure) ([facility]) (May 2021), Chronic kidney disease (2021), Coronary artery disease (May 2021), ESRD needing dialysis ([facility]), History of COVID-19 (09/2020), Hypertension (Sept. 1990), and Peritoneal dialysis catheter in place ([facility]).   Surgical History: has a past surgical history that includes Appendectomy (1994); Peritoneal Dialysis Catheter; Peritoneal Dialysis Catheter (N/A, 7/11/2022); Hemodialysis Catheter (Right, 9/2/2022); Peritoneal Dialysis Catheter Removal (N/A, 10/20/2022); and Hemodialysis Catheter (N/A, 10/24/2022).   Family History: family history includes Cancer in his mother; Heart disease in his maternal grandfather; Hypertension in his mother.   Social History: reports that he quit smoking about 8 months ago. His smoking use included cigarettes. He started smoking about 35 years ago. He has a 20.00 pack-year smoking history. He has never used smokeless tobacco. He reports that he does not drink alcohol and does not use drugs.  Allergies: Coconut and Morphine       Current Outpatient Medications:   •  aspirin 81 MG EC tablet, Take 1 tablet by mouth Daily., Disp: , Rfl:   •  carvedilol (COREG) 25 MG tablet, Take 25 mg by mouth 2 (Two) Times a Day., Disp: , Rfl:   •  cloNIDine (CATAPRES) 0.2 MG tablet, Take 0.2 mg by mouth 3 (Three) Times a Day., Disp: , Rfl:   •  eplerenone (INSPRA) 25 MG tablet, Take 25 mg by mouth Daily., Disp: , Rfl:   •  furosemide (LASIX) 40 MG tablet, Take 40 mg by mouth 2 (Two) Times a Day., Disp: , Rfl:   •   "gentamicin (GARAMYCIN) 0.1 % cream, Apply 1 application topically to the appropriate area as directed Daily. Pd catheter, Disp: , Rfl:   •  hydrALAZINE (APRESOLINE) 100 MG tablet, Take 100 mg by mouth 3 (Three) Times a Day., Disp: , Rfl:   •  HYDROcodone-acetaminophen (Norco) 5-325 MG per tablet, Take 1 tablet by mouth Every 6 (Six) Hours As Needed for Severe Pain or Moderate Pain for up to 12 doses., Disp: 12 tablet, Rfl: 0  •  NIFEdipine XL (PROCARDIA XL) 90 MG 24 hr tablet, Take 90 mg by mouth 2 (Two) Times a Day. as directed, Disp: , Rfl:   •  ondansetron (Zofran) 4 MG tablet, Take 1 tablet by mouth Every 8 (Eight) Hours As Needed for Nausea or Vomiting., Disp: 30 tablet, Rfl: 1  •  polyethylene glycol (MIRALAX) 17 g packet, Take 17 g by mouth 2 (Two) Times a Day As Needed (constipation)., Disp: 60 each, Rfl: 2  •  sennosides-docusate (PERICOLACE) 8.6-50 MG per tablet, Take 2 tablets by mouth Daily As Needed for Constipation., Disp: 30 tablet, Rfl: 1  •  vitamin D (ERGOCALCIFEROL) 1.25 MG (36152 UT) capsule capsule, Take 50,000 Units by mouth 1 (One) Time Per Week. SUNDAYS, Disp: , Rfl:        Physical Exam  He appears well.  His incision looks good and I removed his sutures today.  Objective     Vital Signs:   Resp 18   Ht 175.3 cm (69.02\")   Wt 82.6 kg (182 lb)   BMI 26.86 kg/m²              Assessment and Plan    Diagnoses and all orders for this visit:    1. End stage renal failure on dialysis (HCC) (Primary)    I will see him back on an as-needed basis.      "

## 2022-11-07 NOTE — H&P
[facility]   HISTORY AND PHYSICAL    Patient Name: Radha Hutchison  : 1970  MRN: [id number]  Primary Care Physician:  Provider, No Known  Date of admission: (Not on file)    Subjective   Subjective     Chief Complaint: Need for permanent access for hemodialysis    HPI:    Radha Hutchison is a 52 y.o. male in need for permanent access for hemodialysis.    Review of Systems    Non contributory except for the History of Present Illness    Personal History     Past Medical History:   Diagnosis Date   • CHF (congestive heart failure) (HCC) May 2021    no recent issues   • Coronary artery disease May 2021    follows w/[facility], no c/o cp or soa, workup 2022, instructed to come back in a year   • End stage renal failure on dialysis (HCC)     currently using right side  TDC, dialysis MWF   • History of COVID-19 2020   • Hypertension 1990       Past Surgical History:   Procedure Laterality Date   • APPENDECTOMY     • INSERTION HEMODIALYSIS CATHETER Right 2022    Procedure: HEMODIALYSIS CATHETER INSERTION;  Surgeon: Mikey Perez MD;  Location: [facility];  Service: Vascular;  Laterality: Right;   • INSERTION HEMODIALYSIS CATHETER N/A 10/24/2022    Procedure: HEMODIALYSIS CATHETER INSERTION exchange over guidewire.;  Surgeon: Mikey Perez MD;  Location: [facility] OR;  Service: Vascular;  Laterality: N/A;   • INSERTION PERITONEAL DIALYSIS CATHETER     • INSERTION PERITONEAL DIALYSIS CATHETER N/A 2022    Procedure: DIAGNOSTIC LAPAROSCOPY,  peritoneal dialysis pexy;  Surgeon: Danie Morocho MD;  Location: [facility] OR;  Service: General;  Laterality: N/A;   • REMOVAL PERITONEAL DIALYSIS CATHETER N/A 10/20/2022    Procedure: REMOVAL PERITONEAL DIALYSIS CATHETER;  Surgeon: Matthew Arshad MD;  Location: [facility];  Service: General;  Laterality: N/A;       Family History: family history includes Cancer in his mother; Heart disease in his maternal grandfather;  Hypertension in his mother. Otherwise pertinent FHx was reviewed and not pertinent to current issue.    Social History:  reports that he quit smoking about 8 months ago. His smoking use included cigarettes. He started smoking about 35 years ago. He has a 20.00 pack-year smoking history. He has never used smokeless tobacco. He reports that he does not drink alcohol and does not use drugs.    Home Medications:  No current facility-administered medications on file prior to encounter.     Current Outpatient Medications on File Prior to Encounter   Medication Sig   • aspirin 81 MG EC tablet Take 1 tablet by mouth Daily.   • carvedilol (COREG) 25 MG tablet Take 25 mg by mouth 2 (Two) Times a Day.   • cloNIDine (CATAPRES) 0.2 MG tablet Take 0.2 mg by mouth 3 (Three) Times a Day.   • eplerenone (INSPRA) 25 MG tablet Take 25 mg by mouth Daily.   • furosemide (LASIX) 40 MG tablet Take 40 mg by mouth 2 (Two) Times a Day.   • hydrALAZINE (APRESOLINE) 100 MG tablet Take 100 mg by mouth 3 (Three) Times a Day.   • NIFEdipine XL (PROCARDIA XL) 90 MG 24 hr tablet Take 90 mg by mouth 2 (Two) Times a Day. as directed   • ondansetron (Zofran) 4 MG tablet Take 1 tablet by mouth Every 8 (Eight) Hours As Needed for Nausea or Vomiting.   • polyethylene glycol (MIRALAX) 17 g packet Take 17 g by mouth 2 (Two) Times a Day As Needed (constipation).   • sennosides-docusate (PERICOLACE) 8.6-50 MG per tablet Take 2 tablets by mouth Daily As Needed for Constipation.   • vitamin D (ERGOCALCIFEROL) 1.25 MG (74993 UT) capsule capsule Take 50,000 Units by mouth 1 (One) Time Per Week. SUNDAYS   • [DISCONTINUED] gentamicin (GARAMYCIN) 0.1 % cream Apply 1 application topically to the appropriate area as directed Daily. Pd catheter          Allergies:  Allergies   Allergen Reactions   • Coconut Anaphylaxis   • Morphine Anaphylaxis and Swelling       Objective   Objective     Vitals:        Physical Exam   General: Alert, no acute distress.  Neck:  Supple  Heart: Regular rate  Lungs: Clear  Abdomen: Benign  Extremities: Symmetric  Pulses: +2 left radial, +2 left brachial    Diagnostic studies:   Vein mapping ultrasound dated 10/3/2022 demonstrates satisfactory left upper arm cephalic and bilateral upper arm basilic veins.    Assessment & Plan   Assessment / Plan     Active Hospital Problems:  Active Hospital Problems    Diagnosis    • **End stage renal failure on dialysis (HCC)        There are no diagnoses linked to this encounter.    Assessment/plan:   Mr. Hutchison is in need for permanent access for hemodialysis.  The plan is for creation of a left brachiocephalic arteriovenous fistula.  I have discussed with him in detail the mechanics of the procedure, the indications, benefits, risks, alternatives, as well as potential complications to include but not limited to infection, bleeding, reoperation, failure of the fistula to develop.  He appears to understand and desires to proceed.      Electronically signed by Livan Choi MD, 11/07/22, 5:04 PM EST.

## 2022-11-08 ENCOUNTER — ANESTHESIA (OUTPATIENT)
Dept: PERIOP | Facility: HOSPITAL | Age: 52
End: 2022-11-08

## 2022-11-08 ENCOUNTER — HOSPITAL ENCOUNTER (OUTPATIENT)
Facility: HOSPITAL | Age: 52
Setting detail: HOSPITAL OUTPATIENT SURGERY
Discharge: HOME | End: 2022-11-08
Attending: SURGERY | Admitting: SURGERY

## 2022-11-08 ENCOUNTER — ANESTHESIA EVENT (OUTPATIENT)
Dept: PERIOP | Facility: HOSPITAL | Age: 52
End: 2022-11-08

## 2022-11-08 VITALS
OXYGEN SATURATION: 96 % | WEIGHT: 179.9 LBS | SYSTOLIC BLOOD PRESSURE: 145 MMHG | RESPIRATION RATE: 16 BRPM | HEART RATE: 69 BPM | DIASTOLIC BLOOD PRESSURE: 99 MMHG | HEIGHT: 69 IN | BODY MASS INDEX: 26.64 KG/M2 | TEMPERATURE: 98.2 F

## 2022-11-08 DIAGNOSIS — N18.6 ESRD ON DIALYSIS: ICD-10-CM

## 2022-11-08 DIAGNOSIS — Z99.2 END STAGE RENAL FAILURE ON DIALYSIS: Primary | ICD-10-CM

## 2022-11-08 DIAGNOSIS — N18.6 END STAGE RENAL FAILURE ON DIALYSIS: Primary | ICD-10-CM

## 2022-11-08 DIAGNOSIS — Z99.2 ESRD ON DIALYSIS: ICD-10-CM

## 2022-11-08 LAB
ANION GAP SERPL CALCULATED.3IONS-SCNC: 10.2 MMOL/L (ref 5–15)
BASOPHILS # BLD AUTO: 0.08 10*3/MM3 (ref 0–0.2)
BASOPHILS NFR BLD AUTO: 0.9 % (ref 0–1.5)
BUN SERPL-MCNC: 38 MG/DL (ref 6–20)
BUN/CREAT SERPL: 13.9 (ref 7–25)
CALCIUM SPEC-SCNC: 9.8 MG/DL (ref 8.6–10.5)
CHLORIDE SERPL-SCNC: 98 MMOL/L (ref 98–107)
CO2 SERPL-SCNC: 27.8 MMOL/L (ref 22–29)
CREAT SERPL-MCNC: 2.74 MG/DL (ref 0.76–1.27)
DEPRECATED RDW RBC AUTO: 42.5 FL (ref 37–54)
EGFRCR SERPLBLD CKD-EPI 2021: 27.4 ML/MIN/1.73
EOSINOPHIL # BLD AUTO: 0.53 10*3/MM3 (ref 0–0.4)
EOSINOPHIL NFR BLD AUTO: 5.7 % (ref 0.3–6.2)
ERYTHROCYTE [DISTWIDTH] IN BLOOD BY AUTOMATED COUNT: 13.7 % (ref 12.3–15.4)
GLUCOSE SERPL-MCNC: 107 MG/DL (ref 65–99)
HCT VFR BLD AUTO: 40.5 % (ref 37.5–51)
HGB BLD-MCNC: 13.7 G/DL (ref 13–17.7)
IMM GRANULOCYTES # BLD AUTO: 0.04 10*3/MM3 (ref 0–0.05)
IMM GRANULOCYTES NFR BLD AUTO: 0.4 % (ref 0–0.5)
LYMPHOCYTES # BLD AUTO: 1.76 10*3/MM3 (ref 0.7–3.1)
LYMPHOCYTES NFR BLD AUTO: 19 % (ref 19.6–45.3)
MCH RBC QN AUTO: 28.7 PG (ref 26.6–33)
MCHC RBC AUTO-ENTMCNC: 33.8 G/DL (ref 31.5–35.7)
MCV RBC AUTO: 84.7 FL (ref 79–97)
MONOCYTES # BLD AUTO: 0.97 10*3/MM3 (ref 0.1–0.9)
MONOCYTES NFR BLD AUTO: 10.5 % (ref 5–12)
NEUTROPHILS NFR BLD AUTO: 5.89 10*3/MM3 (ref 1.7–7)
NEUTROPHILS NFR BLD AUTO: 63.5 % (ref 42.7–76)
NRBC BLD AUTO-RTO: 0 /100 WBC (ref 0–0.2)
PLATELET # BLD AUTO: 237 10*3/MM3 (ref 140–450)
PMV BLD AUTO: 9.8 FL (ref 6–12)
POTASSIUM SERPL-SCNC: 4.8 MMOL/L (ref 3.5–5.2)
RBC # BLD AUTO: 4.78 10*6/MM3 (ref 4.14–5.8)
SODIUM SERPL-SCNC: 136 MMOL/L (ref 136–145)
WBC NRBC COR # BLD: 9.27 10*3/MM3 (ref 3.4–10.8)

## 2022-11-08 PROCEDURE — 36821 AV FUSION DIRECT ANY SITE: CPT | Performed by: SURGERY

## 2022-11-08 PROCEDURE — 25010000002 HEPARIN (PORCINE) PER 1000 UNITS: Performed by: NURSE ANESTHETIST, CERTIFIED REGISTERED

## 2022-11-08 PROCEDURE — 80048 BASIC METABOLIC PNL TOTAL CA: CPT | Performed by: SURGERY

## 2022-11-08 PROCEDURE — 25010000002 DEXAMETHASONE PER 1 MG: Performed by: NURSE ANESTHETIST, CERTIFIED REGISTERED

## 2022-11-08 PROCEDURE — 25010000002 HEPARIN (PORCINE) PER 1000 UNITS: Performed by: SURGERY

## 2022-11-08 PROCEDURE — 0 LIDOCAINE 1 % SOLUTION 10 ML VIAL: Performed by: SURGERY

## 2022-11-08 PROCEDURE — 25010000002 CEFAZOLIN IN DEXTROSE 2-4 GM/100ML-% SOLUTION: Performed by: SURGERY

## 2022-11-08 PROCEDURE — 85025 COMPLETE CBC W/AUTO DIFF WBC: CPT | Performed by: SURGERY

## 2022-11-08 PROCEDURE — 25010000002 FENTANYL CITRATE (PF) 50 MCG/ML SOLUTION: Performed by: NURSE ANESTHETIST, CERTIFIED REGISTERED

## 2022-11-08 PROCEDURE — 25010000002 MIDAZOLAM PER 1 MG: Performed by: ANESTHESIOLOGY

## 2022-11-08 PROCEDURE — 25010000002 PROPOFOL 10 MG/ML EMULSION: Performed by: NURSE ANESTHETIST, CERTIFIED REGISTERED

## 2022-11-08 DEVICE — LIGACLIP MCA MULTIPLE CLIP APPLIERS, 20 MEDIUM CLIPS
Type: IMPLANTABLE DEVICE | Site: ARM | Status: FUNCTIONAL
Brand: LIGACLIP

## 2022-11-08 DEVICE — LIGACLIP MCA MULTIPLE CLIP APPLIERS, 20 SMALL CLIPS
Type: IMPLANTABLE DEVICE | Site: ARM | Status: FUNCTIONAL
Brand: LIGACLIP

## 2022-11-08 RX ORDER — DEXAMETHASONE SODIUM PHOSPHATE 4 MG/ML
INJECTION, SOLUTION INTRA-ARTICULAR; INTRALESIONAL; INTRAMUSCULAR; INTRAVENOUS; SOFT TISSUE AS NEEDED
Status: DISCONTINUED | OUTPATIENT
Start: 2022-11-08 | End: 2022-11-08 | Stop reason: SURG

## 2022-11-08 RX ORDER — LIDOCAINE HYDROCHLORIDE 20 MG/ML
INJECTION, SOLUTION EPIDURAL; INFILTRATION; INTRACAUDAL; PERINEURAL AS NEEDED
Status: DISCONTINUED | OUTPATIENT
Start: 2022-11-08 | End: 2022-11-08 | Stop reason: SURG

## 2022-11-08 RX ORDER — CEFAZOLIN SODIUM 2 G/100ML
2 INJECTION, SOLUTION INTRAVENOUS ONCE
Status: COMPLETED | OUTPATIENT
Start: 2022-11-08 | End: 2022-11-08

## 2022-11-08 RX ORDER — OXYCODONE HYDROCHLORIDE 5 MG/1
5 TABLET ORAL
Status: DISCONTINUED | OUTPATIENT
Start: 2022-11-08 | End: 2022-11-08 | Stop reason: HOSPADM

## 2022-11-08 RX ORDER — HEPARIN SODIUM 10000 [USP'U]/ML
INJECTION, SOLUTION INTRAVENOUS; SUBCUTANEOUS AS NEEDED
Status: DISCONTINUED | OUTPATIENT
Start: 2022-11-08 | End: 2022-11-08 | Stop reason: HOSPADM

## 2022-11-08 RX ORDER — ONDANSETRON 2 MG/ML
4 INJECTION INTRAMUSCULAR; INTRAVENOUS ONCE AS NEEDED
Status: DISCONTINUED | OUTPATIENT
Start: 2022-11-08 | End: 2022-11-08 | Stop reason: HOSPADM

## 2022-11-08 RX ORDER — MAGNESIUM HYDROXIDE 1200 MG/15ML
LIQUID ORAL AS NEEDED
Status: DISCONTINUED | OUTPATIENT
Start: 2022-11-08 | End: 2022-11-08 | Stop reason: HOSPADM

## 2022-11-08 RX ORDER — HEPARIN SODIUM 1000 [USP'U]/ML
INJECTION, SOLUTION INTRAVENOUS; SUBCUTANEOUS AS NEEDED
Status: DISCONTINUED | OUTPATIENT
Start: 2022-11-08 | End: 2022-11-08 | Stop reason: SURG

## 2022-11-08 RX ORDER — GLYCOPYRROLATE 0.2 MG/ML
0.2 INJECTION INTRAMUSCULAR; INTRAVENOUS
Status: COMPLETED | OUTPATIENT
Start: 2022-11-08 | End: 2022-11-08

## 2022-11-08 RX ORDER — PROPOFOL 10 MG/ML
VIAL (ML) INTRAVENOUS AS NEEDED
Status: DISCONTINUED | OUTPATIENT
Start: 2022-11-08 | End: 2022-11-08 | Stop reason: SURG

## 2022-11-08 RX ORDER — ROCURONIUM BROMIDE 10 MG/ML
INJECTION, SOLUTION INTRAVENOUS AS NEEDED
Status: DISCONTINUED | OUTPATIENT
Start: 2022-11-08 | End: 2022-11-08 | Stop reason: SURG

## 2022-11-08 RX ORDER — PROMETHAZINE HYDROCHLORIDE 25 MG/1
25 SUPPOSITORY RECTAL ONCE AS NEEDED
Status: DISCONTINUED | OUTPATIENT
Start: 2022-11-08 | End: 2022-11-08 | Stop reason: HOSPADM

## 2022-11-08 RX ORDER — FENTANYL CITRATE 50 UG/ML
INJECTION, SOLUTION INTRAMUSCULAR; INTRAVENOUS AS NEEDED
Status: DISCONTINUED | OUTPATIENT
Start: 2022-11-08 | End: 2022-11-08 | Stop reason: SURG

## 2022-11-08 RX ORDER — SODIUM CHLORIDE 9 MG/ML
9 INJECTION, SOLUTION INTRAVENOUS CONTINUOUS PRN
Status: DISCONTINUED | OUTPATIENT
Start: 2022-11-08 | End: 2022-11-08 | Stop reason: HOSPADM

## 2022-11-08 RX ORDER — PROMETHAZINE HYDROCHLORIDE 12.5 MG/1
25 TABLET ORAL ONCE AS NEEDED
Status: DISCONTINUED | OUTPATIENT
Start: 2022-11-08 | End: 2022-11-08 | Stop reason: HOSPADM

## 2022-11-08 RX ORDER — MIDAZOLAM HYDROCHLORIDE 1 MG/ML
2 INJECTION INTRAMUSCULAR; INTRAVENOUS ONCE
Status: COMPLETED | OUTPATIENT
Start: 2022-11-08 | End: 2022-11-08

## 2022-11-08 RX ORDER — MEPERIDINE HYDROCHLORIDE 25 MG/ML
12.5 INJECTION INTRAMUSCULAR; INTRAVENOUS; SUBCUTANEOUS
Status: DISCONTINUED | OUTPATIENT
Start: 2022-11-08 | End: 2022-11-08 | Stop reason: HOSPADM

## 2022-11-08 RX ORDER — OXYCODONE HYDROCHLORIDE AND ACETAMINOPHEN 5; 325 MG/1; MG/1
1 TABLET ORAL EVERY 6 HOURS PRN
Qty: 20 TABLET | Refills: 0 | Status: SHIPPED | OUTPATIENT
Start: 2022-11-08

## 2022-11-08 RX ADMIN — PROPOFOL 200 MG: 10 INJECTION, EMULSION INTRAVENOUS at 13:31

## 2022-11-08 RX ADMIN — HEPARIN SODIUM 3000 UNITS: 1000 INJECTION INTRAVENOUS; SUBCUTANEOUS at 14:03

## 2022-11-08 RX ADMIN — FENTANYL CITRATE 50 MCG: 50 INJECTION, SOLUTION INTRAMUSCULAR; INTRAVENOUS at 13:33

## 2022-11-08 RX ADMIN — GLYCOPYRROLATE 0.2 MG: 0.2 INJECTION INTRAMUSCULAR; INTRAVENOUS at 12:59

## 2022-11-08 RX ADMIN — ROCURONIUM BROMIDE 50 MG: 10 INJECTION INTRAVENOUS at 13:31

## 2022-11-08 RX ADMIN — FENTANYL CITRATE 50 MCG: 50 INJECTION, SOLUTION INTRAMUSCULAR; INTRAVENOUS at 13:31

## 2022-11-08 RX ADMIN — SODIUM CHLORIDE 9 ML/HR: 9 INJECTION, SOLUTION INTRAVENOUS at 12:29

## 2022-11-08 RX ADMIN — CEFAZOLIN SODIUM 2 G: 2 INJECTION, SOLUTION INTRAVENOUS at 13:30

## 2022-11-08 RX ADMIN — DEXAMETHASONE SODIUM PHOSPHATE 4 MG: 4 INJECTION, SOLUTION INTRA-ARTICULAR; INTRALESIONAL; INTRAMUSCULAR; INTRAVENOUS; SOFT TISSUE at 13:45

## 2022-11-08 RX ADMIN — MIDAZOLAM 2 MG: 1 INJECTION INTRAMUSCULAR; INTRAVENOUS at 12:59

## 2022-11-08 RX ADMIN — LIDOCAINE HYDROCHLORIDE 50 MG: 20 INJECTION, SOLUTION EPIDURAL; INFILTRATION; INTRACAUDAL; PERINEURAL at 13:38

## 2022-11-08 RX ADMIN — LIDOCAINE HYDROCHLORIDE 50 MG: 20 INJECTION, SOLUTION EPIDURAL; INFILTRATION; INTRACAUDAL; PERINEURAL at 13:31

## 2022-11-08 RX ADMIN — SUGAMMADEX 200 MG: 100 INJECTION, SOLUTION INTRAVENOUS at 14:35

## 2022-11-08 NOTE — ANESTHESIA POSTPROCEDURE EVALUATION
Patient: Radha Hutchison    Procedure Summary     Date: 11/08/22 Room / Location: [facility] OR 01 / [facility] MAIN OR    Anesthesia Start: 1326 Anesthesia Stop: 1449    Procedure: CREATION OF ARTERIOVENOUS FISTULA LEFT ARM (Left) Diagnosis:       ESRD on dialysis (HCC)      (ESRD on dialysis (HCC) [N18.6, Z99.2])    Surgeons: Livan Choi MD Provider: Jeremy Casiano MD    Anesthesia Type: general ASA Status: 4          Anesthesia Type: general    Vitals  Vitals Value Taken Time   /105 11/08/22 1457   Temp 36.4 °C (97.6 °F) 11/08/22 1445   Pulse 71 11/08/22 1500   Resp 16 11/08/22 1455   SpO2 95 % 11/08/22 1500   Vitals shown include unvalidated device data.        Post Anesthesia Care and Evaluation    Patient location during evaluation: bedside  Patient participation: complete - patient participated  Level of consciousness: awake  Pain management: adequate    Airway patency: patent  Anesthetic complications: No anesthetic complications  PONV Status: none  Cardiovascular status: acceptable and stable  Respiratory status: acceptable  Hydration status: acceptable    Comments: An Anesthesiologist personally participated in the most demanding procedures (including induction and emergence if applicable) in the anesthesia plan, monitored the course of anesthesia administration at frequent intervals and remained physically present and available for immediate diagnosis and treatment of emergencies.

## 2022-11-08 NOTE — DISCHARGE INSTRUCTIONS
DISCHARGE INSTRUCTIONS  AV FISTULA PLACEMENT      For your surgery you had:  General anesthesia (you may have a sore throat for the first 24 hours)  You may experience dizziness, drowsiness, or light-headedness for several hours following surgery/procedure.  Do not stay alone today or tonight.  Limit your activity for 24 hours.  Resume your diet slowly.  Follow whatever special dietary instructions you may have been given by your doctor.  You should not drive or operate machinery, drink alcohol, or sign legally binding documents for 24 hours or while you are taking pain medication.    NOTIFY YOUR DOCTOR IF YOU EXPERIENCE ANY OF THE FOLLOWING:  Temperature greater than 101 degrees Fahrenheit  Shaking Chills  Redness or excessive drainage from incision  Nausea, vomiting and/or pain that is not controlled by prescribed medications  Increase in bleeding or bleeding that is excessive  Unable to urinate in 6 hours after surgery  If unable to reach your doctor, please go to the closest Emergency Room  Special Instructions:  Keep left arm elevated.    Limit excessive bending of left arm.    No tight clothing to left arm.    No needle sticks or blood pressures to left arm.    Notify MD of excessive bleeding at site.    Limit sleeping on left arm.        Last dose of pain medication was given at:   May take pain pill at anytime if needed.

## 2022-11-08 NOTE — OP NOTE
ARTERIOVENOUS FISTULA FORMATION  Procedure Report    Patient Name:  Radha Hutchison  YOB: 1970    Date of Surgery:  11/8/2022     Indications: Need for permanent access for hemodialysis    Pre-op Diagnosis:   ESRD on dialysis (HCC) [N18.6, Z99.2]       Post-Op Diagnosis Codes:     * ESRD on dialysis (HCC) [N18.6, Z99.2]    Procedure(s):  CREATION OF ARTERIOVENOUS FISTULA LEFT ARM    Staff:  Surgeon(s):  Livan Choi MD    Assistant: Daxa Lei RN    Anesthesia: General    Estimated Blood Loss: 30 mL    Implants:    Implant Name Type Inv. Item Serial No.  Lot No. LRB No. Used Action   CLIPAPPLR M/ ENDO LIGACLIP 20CLP 11IN MD - [id number] Implant CLIPAPPLR M/ ENDO LIGACLIP 20CLP 11IN MD  [employer]  [employer] J [id number] Left 1 Implanted   CLIPAPPLR M/ ENDO LIGACLIP 9 3/8IN SM - [id number] Implant CLIPAPPLR M/ ENDO LIGACLIP 9 3/8IN SM  [employer]  DIV [employer] J [employer] J [id number] Left 1 Implanted       Specimen: None       Findings: Palpable thrill upon completion of the procedure.  Doppler examination reveals a pulsatile signal with high amplitude continuous diastolic flow.    Complications: None    Description of Procedure: The patient was brought into the operating room and was placed in the supine position.  He was then given intravenous sedation by anesthesia and positioned with the left arm extended on an armboard.  He was then prepped and draped in the usual aseptic fashion.  A stockinette was applied to the hand and forearm and was secured in place using Coban.  A timeout was taken to confirm patient, procedure and laterality.  Local anesthesia was then administered at the projected site of incision.  An incision was then made in a transverse fashion over the distal left upper arm approximately 1 fingerbreadth above the antecubital crease.  The subcutaneous tissue was traversed using electrocautery.  Blunt and sharp dissection were then used to dissect the cephalic  vein.  This was dissected approximately 2 to 3 cm distal and proximal to the incision.  The vein was marked with a marker to avoid twisting.  The distal end was then clipped and the vein transected.  The vein was then flushed with heparinized saline and probed with a #3 dilator with no resistance.  It was controlled with a bulldog.  Blunt and sharp dissection were then used to dissected the brachial artery.  A segment of approximately 2 cm was freed.  Proximal and distal control was obtained using Vesseloops.  The patient was given systemic anticoagulation in the form of heparin 3000 units IV.  3 minutes were allowed for the heparin to circulate.  The brachial artery was then clamped distally and proximally.  A longitudinal arteriotomy was made using an 11 blade and extended using Alarcon scissors.  The total length of the arteriotomy was 6 mm.  The end of the vein was then fashioned to meet the dimensions of the arteriotomy.  An anastomosis was then created using 6-0 Prolene in a running fashion.  Just prior to completion of the anastomosis all vessels were bled.  The anastomosis was completed.  Doppler examination revealed the above-mentioned findings.  The wound was inspected for hemostasis and hemostasis assured.  The wound was then approximated using 3-0 Vicryl in a running fashion for approximation of the dermis and subcutaneous tissue.  Dermabond was then applied to the wound.  The patient tolerated the procedure well.         Assistant: Daxa Lei RN  was responsible for performing the following activities: First assist and their skilled assistance was necessary for the success of this case.    Livan Choi MD     Date: 11/8/2022  Time: 14:54 EST

## 2022-11-21 ENCOUNTER — TELEPHONE (OUTPATIENT)
Dept: VASCULAR SURGERY | Facility: HOSPITAL | Age: 52
End: 2022-11-21

## 2022-11-21 NOTE — TELEPHONE ENCOUNTER
Caller: Radha Hutchison    Relationship to patient: Self    Best call back number: [phone]    Type of visit: POST-OP    Requested date: ANYDAY BUT IF ON MON, WED, FRI PT HAS DIALYSIS 11:15-3:30    If rescheduling, when is the original appointment: 11.23.22    Additional notes:  PT WAS NOT FEELING WELL AND COULD NOT MAKE APPT FOR 11.23.22

## 2022-12-16 ENCOUNTER — OFFICE VISIT (OUTPATIENT)
Dept: VASCULAR SURGERY | Facility: HOSPITAL | Age: 52
End: 2022-12-16

## 2022-12-16 VITALS
OXYGEN SATURATION: 98 % | RESPIRATION RATE: 18 BRPM | DIASTOLIC BLOOD PRESSURE: 80 MMHG | HEART RATE: 76 BPM | HEIGHT: 69 IN | TEMPERATURE: 98 F | SYSTOLIC BLOOD PRESSURE: 120 MMHG | WEIGHT: 167 LBS | BODY MASS INDEX: 24.73 KG/M2

## 2022-12-16 DIAGNOSIS — N18.6 ESRD ON DIALYSIS: Primary | ICD-10-CM

## 2022-12-16 DIAGNOSIS — Z99.2 ESRD ON DIALYSIS: Primary | ICD-10-CM

## 2022-12-16 PROCEDURE — 99024 POSTOP FOLLOW-UP VISIT: CPT | Performed by: SURGERY

## 2022-12-16 PROCEDURE — G0463 HOSPITAL OUTPT CLINIC VISIT: HCPCS | Performed by: SURGERY

## 2022-12-16 NOTE — PROGRESS NOTES
[facility]   Follow up Office    Patient Name: Radha Hutchison  : 1970  MRN: [id number]  Primary Care Physician:  Provider, No Known      Subjective   Subjective     HPI:    Radha Hutchison is a 52 y.o. male here for follow-up after creation of left arm fistula 2022.  Doing well.  No complaints.      Objective     Vitals:   Temp:  [98 °F (36.7 °C)] 98 °F (36.7 °C)  Heart Rate:  [76] 76  Resp:  [18] 18  BP: (120)/(80) 120/80    Physical Exam      General: Alert, no acute distress  Wound: Healing well, no signs of infection.  AV fistula: Excellent bruit and thrill.  Pulses: +2 left radial pulse.    Assessment & Plan   Assessment / Plan     Diagnoses and all orders for this visit:    1. ESRD on dialysis (HCC) (Primary)       Assessment/Plan:   Satisfactory progress following creation of left arm fistula.  The fistula appears just about ready to be used if necessary, however, at this time I would recommend that we wait another month before begin using it.  Follow-up with me in 1 month.        Electronically signed by Livan Choi MD, 22, 11:03 AM EST.

## 2023-01-23 ENCOUNTER — OFFICE VISIT (OUTPATIENT)
Dept: VASCULAR SURGERY | Facility: HOSPITAL | Age: 53
End: 2023-01-23
Payer: MEDICARE

## 2023-01-23 VITALS
TEMPERATURE: 97.8 F | HEART RATE: 76 BPM | DIASTOLIC BLOOD PRESSURE: 70 MMHG | SYSTOLIC BLOOD PRESSURE: 122 MMHG | OXYGEN SATURATION: 98 % | RESPIRATION RATE: 18 BRPM

## 2023-01-23 DIAGNOSIS — Z99.2 ESRD ON DIALYSIS: Primary | ICD-10-CM

## 2023-01-23 DIAGNOSIS — N18.6 ESRD ON DIALYSIS: Primary | ICD-10-CM

## 2023-01-23 PROCEDURE — 99024 POSTOP FOLLOW-UP VISIT: CPT | Performed by: SURGERY

## 2023-01-23 PROCEDURE — G0463 HOSPITAL OUTPT CLINIC VISIT: HCPCS | Performed by: SURGERY

## 2023-01-23 NOTE — PROGRESS NOTES
[facility]   Follow up Office    Patient Name: Radha Hutchison  : 1970  MRN: [id number]  Primary Care Physician:  Provider, No Known      Subjective   Subjective     HPI:    Radha Hutchison is a 52 y.o. male here for follow-up after creation of left arm fistula 2022.  Doing well.  No complaints.      Objective     Vitals:   Temp:  [97.8 °F (36.6 °C)] 97.8 °F (36.6 °C)  Heart Rate:  [76] 76  Resp:  [18] 18  BP: (122)/(70) 122/70    Physical Exam      General: Alert, no acute distress  Left arm fistula: Excellent bruit and thrill.    Assessment & Plan   Assessment / Plan     Diagnoses and all orders for this visit:    1. ESRD on dialysis (HCC) (Primary)       Assessment/Plan:   Mr. Hutchison's left arm fistula appears ready for use.  He already has Emla.  He is to notify the dialysis unit that the fistula can be accessed.  Call us as needed to remove the catheter if the fistula successfully used at least a couple of weeks.        Electronically signed by Livan Choi MD, 23, 10:18 AM EST.

## 2023-02-13 ENCOUNTER — PREP FOR SURGERY (OUTPATIENT)
Dept: OTHER | Facility: HOSPITAL | Age: 53
End: 2023-02-13
Payer: MEDICARE

## 2023-02-13 DIAGNOSIS — Z99.2 ESRD ON DIALYSIS: Primary | ICD-10-CM

## 2023-02-13 DIAGNOSIS — N18.6 ESRD ON DIALYSIS: Primary | ICD-10-CM

## 2023-02-16 PROCEDURE — S0260 H&P FOR SURGERY: HCPCS | Performed by: SURGERY

## 2023-02-16 NOTE — H&P
[facility]   HISTORY AND PHYSICAL    Patient Name: Radha Hutchison  : 1970  MRN: [id number]  Primary Care Physician:  Provider, No Known  Date of admission: (Not on file)    Subjective   Subjective     Chief Complaint: Completed use of tunneled catheter for hemodialysis    HPI:    Radha Hutchison is a 52 y.o. male undergoing hemodialysis currently with a functioning fistula.  At this time no longer need for a tunneled catheter.    Review of Systems    Non contributory except for the History of Present Illness    Personal History     Past Medical History:   Diagnosis Date   • CHF (congestive heart failure) (HCC) May 2021    no recent issues   • Coronary artery disease May 2021    follows w/[facility], no c/o cp or soa, workup 2022, instructed to come back in a year   • End stage renal failure on dialysis (HCC)     currently using right side  TDC, dialysis MWF   • History of COVID-19 2020   • Hypertension 1990       Past Surgical History:   Procedure Laterality Date   • APPENDECTOMY     • ARTERIOVENOUS FISTULA/SHUNT SURGERY Left 2022    Procedure: CREATION OF ARTERIOVENOUS FISTULA LEFT ARM;  Surgeon: Livan Choi MD;  Location: [facility];  Service: Vascular;  Laterality: Left;   • INSERTION HEMODIALYSIS CATHETER Right 2022    Procedure: HEMODIALYSIS CATHETER INSERTION;  Surgeon: Mikey Perez MD;  Location: [facility] MAIN OR;  Service: Vascular;  Laterality: Right;   • INSERTION HEMODIALYSIS CATHETER N/A 10/24/2022    Procedure: HEMODIALYSIS CATHETER INSERTION exchange over guidewire.;  Surgeon: Mikey Perez MD;  Location: [facility] MAIN OR;  Service: Vascular;  Laterality: N/A;   • INSERTION PERITONEAL DIALYSIS CATHETER     • INSERTION PERITONEAL DIALYSIS CATHETER N/A 2022    Procedure: DIAGNOSTIC LAPAROSCOPY,  peritoneal dialysis pexy;  Surgeon: Danie Morocho MD;  Location: [facility] MAIN OR;  Service: General;  Laterality: N/A;   • REMOVAL PERITONEAL DIALYSIS  CATHETER N/A 10/20/2022    Procedure: REMOVAL PERITONEAL DIALYSIS CATHETER;  Surgeon: Matthew Arshad MD;  [facility];  Service: General;  Laterality: N/A;       Family History: family history includes Cancer in his mother; Heart disease in his maternal grandfather; Hypertension in his mother. Otherwise pertinent FHx was reviewed and not pertinent to current issue.    Social History:  reports that he quit smoking about a year ago. His smoking use included cigarettes. He started smoking about 35 years ago. He has a 20.00 pack-year smoking history. He has never used smokeless tobacco. He reports that he does not drink alcohol and does not use drugs.    Home Medications:  No current facility-administered medications on file prior to encounter.     Current Outpatient Medications on File Prior to Encounter   Medication Sig   • aspirin 81 MG EC tablet Take 1 tablet by mouth Daily.   • carvedilol (COREG) 25 MG tablet Take 25 mg by mouth 2 (Two) Times a Day.   • cloNIDine (CATAPRES) 0.2 MG tablet Take 0.2 mg by mouth 3 (Three) Times a Day.   • eplerenone (INSPRA) 25 MG tablet Take 25 mg by mouth Daily.   • furosemide (LASIX) 40 MG tablet Take 40 mg by mouth 2 (Two) Times a Day.   • hydrALAZINE (APRESOLINE) 100 MG tablet Take 100 mg by mouth 3 (Three) Times a Day.   • NIFEdipine XL (PROCARDIA XL) 90 MG 24 hr tablet Take 90 mg by mouth 2 (Two) Times a Day. as directed   • ondansetron (Zofran) 4 MG tablet Take 1 tablet by mouth Every 8 (Eight) Hours As Needed for Nausea or Vomiting.   • oxyCODONE-acetaminophen (Percocet) 5-325 MG per tablet Take 1 tablet by mouth Every 6 (Six) Hours As Needed for Severe Pain.   • polyethylene glycol (MIRALAX) 17 g packet Take 17 g by mouth 2 (Two) Times a Day As Needed (constipation).   • sennosides-docusate (PERICOLACE) 8.6-50 MG per tablet Take 2 tablets by mouth Daily As Needed for Constipation.   • vitamin D (ERGOCALCIFEROL) 1.25 MG (05269 UT) capsule capsule Take 1 capsule  by mouth 1 (One) Time Per Week. MONDAYS          Allergies:  Allergies   Allergen Reactions   • Coconut Anaphylaxis   • Morphine Anaphylaxis and Swelling       Objective   Objective     Vitals:        Physical Exam   General: Alert, no acute distress.  Neck: Supple  Heart: Regular rate  Lungs: Clear  Abdomen: Benign  Extremities: Symmetric  Left arm fistula: Satisfactory bruit and thrill.    Assessment & Plan   Assessment / Plan     Active Hospital Problems:  Active Hospital Problems    Diagnosis    • **End stage renal failure on dialysis (HCC)        Diagnoses and all orders for this visit:    1. ESRD on dialysis (HCC)  -     Invasive peripheral vascular study; Standing  -     Invasive peripheral vascular study        Assessment/plan:   Mr. Hutchison is no longer in need for a tunneled catheter as he has a functioning fistula.  Plan removal of existing tunneled catheter for hemodialysis.  I have discussed with the patient in detail the mechanics of the procedure, the indications, benefits, risks, alternatives as well as potential complications to include but not limited to infection, bleeding, inability to remove the catheter.  Patient appears to understand and desires to proceed.      Electronically signed by Livan Choi MD, 02/16/23, 1:49 PM EST.

## 2023-02-17 ENCOUNTER — HOSPITAL ENCOUNTER (OUTPATIENT)
Facility: HOSPITAL | Age: 53
Setting detail: HOSPITAL OUTPATIENT SURGERY
Discharge: HOME | End: 2023-02-17
Attending: SURGERY | Admitting: SURGERY
Payer: MEDICARE

## 2023-02-17 VITALS
SYSTOLIC BLOOD PRESSURE: 151 MMHG | TEMPERATURE: 98.3 F | HEART RATE: 73 BPM | DIASTOLIC BLOOD PRESSURE: 99 MMHG | RESPIRATION RATE: 18 BRPM | OXYGEN SATURATION: 96 %

## 2023-02-17 DIAGNOSIS — Z99.2 ESRD ON DIALYSIS: ICD-10-CM

## 2023-02-17 DIAGNOSIS — N18.6 ESRD ON DIALYSIS: ICD-10-CM

## 2023-02-17 PROCEDURE — 25010000002 FENTANYL CITRATE (PF) 50 MCG/ML SOLUTION: Performed by: SURGERY

## 2023-02-17 PROCEDURE — 36589 REMOVAL TUNNELED CV CATH: CPT | Performed by: SURGERY

## 2023-02-17 PROCEDURE — 99152 MOD SED SAME PHYS/QHP 5/>YRS: CPT | Performed by: SURGERY

## 2023-02-17 PROCEDURE — 25010000002 MIDAZOLAM PER 1 MG: Performed by: SURGERY

## 2023-02-17 RX ORDER — MIDAZOLAM HYDROCHLORIDE 1 MG/ML
INJECTION INTRAMUSCULAR; INTRAVENOUS
Status: DISCONTINUED | OUTPATIENT
Start: 2023-02-17 | End: 2023-02-17 | Stop reason: HOSPADM

## 2023-02-17 RX ORDER — FENTANYL CITRATE 50 UG/ML
INJECTION, SOLUTION INTRAMUSCULAR; INTRAVENOUS
Status: DISCONTINUED | OUTPATIENT
Start: 2023-02-17 | End: 2023-02-17 | Stop reason: HOSPADM

## 2023-02-17 RX ORDER — LIDOCAINE HYDROCHLORIDE 20 MG/ML
INJECTION, SOLUTION INFILTRATION; PERINEURAL
Status: DISCONTINUED | OUTPATIENT
Start: 2023-02-17 | End: 2023-02-17 | Stop reason: HOSPADM

## 2023-02-17 NOTE — DISCHARGE INSTRUCTIONS
May wash area/shower tomorrow.  May remove dressing after showering, then apply antibiotic ointment, cover with a Band-Aid.  Repeat daily until healed.  Follow-up in the office as needed.  Resume home diet.  Resume home medications.

## 2023-10-30 ENCOUNTER — TELEPHONE (OUTPATIENT)
Dept: FAMILY MEDICINE CLINIC | Facility: CLINIC | Age: 53
End: 2023-10-30

## 2023-10-30 NOTE — TELEPHONE ENCOUNTER
[facility] FULL       Patient cancelled their appointment scheduled for 10/30/23 with Reanna Pitt. Would patient like to reschedule?       HUB TO READ AND RELAY

## 2024-04-19 ENCOUNTER — OFFICE VISIT (OUTPATIENT)
Dept: VASCULAR SURGERY | Facility: HOSPITAL | Age: 54
End: 2024-04-19
Payer: MEDICARE

## 2024-04-19 VITALS
HEART RATE: 70 BPM | SYSTOLIC BLOOD PRESSURE: 142 MMHG | TEMPERATURE: 97.6 F | RESPIRATION RATE: 18 BRPM | OXYGEN SATURATION: 97 % | DIASTOLIC BLOOD PRESSURE: 80 MMHG

## 2024-04-19 DIAGNOSIS — T82.590A DIALYSIS AV FISTULA MALFUNCTION, INITIAL ENCOUNTER: ICD-10-CM

## 2024-04-19 DIAGNOSIS — N18.6 ESRD ON DIALYSIS: Primary | ICD-10-CM

## 2024-04-19 DIAGNOSIS — Z99.2 ESRD ON DIALYSIS: Primary | ICD-10-CM

## 2024-04-19 PROCEDURE — G0463 HOSPITAL OUTPT CLINIC VISIT: HCPCS | Performed by: SURGERY

## 2024-04-19 NOTE — PROGRESS NOTES
[facility]   Follow up Office    Patient Name: Radha Hutchison  : 1970  MRN: [id number]  Primary Care Physician:  Provider, No Known      Subjective   Subjective     HPI:    Radha Hutchison is a 53 y.o. male here to check on his left arm fistula.  His left arm fistula was created 2022.  Most recently he has been having pain which is essentially constant under the left collarbone.  Also sometimes a little bit of pain and a little bubble in the proximal segment of the fistula.      Objective     Vitals:   Temp:  [97.6 °F (36.4 °C)] 97.6 °F (36.4 °C)  Heart Rate:  [70] 70  Resp:  [18] 18  BP: (142)/(80) 142/80    Physical Exam      General: Alert, no acute distress.  HEENT: PERRLA  Abdomen: Benign  Extremities: Symmetric.  Left arm fistula: Hyper pulsatile.  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  Neuro: No gross deficits    Assessment & Plan   Assessment / Plan     Diagnoses and all orders for this visit:    1. ESRD on dialysis (Primary)  -     Case Request; Standing  -     Case Request    2. Dialysis AV fistula malfunction, initial encounter  -     Case Request; Standing  -     Case Request    Other orders  -     Follow Anesthesia Guidelines / Protocol; Future  -     Follow Anesthesia Guidelines / Protocol; Standing  -     Verify NPO Status; Standing  -     Obtain Informed Consent; Standing  -     CBC & Differential; Standing  -     Basic Metabolic Panel; Standing  -     Insert Peripheral IV; Standing  -     Saline Lock & Maintain IV Access; Standing  -     VTE Prophylaxis Not Indicated: Other: Patient Currently Anticoagulated / Receiving Prophylaxis; Standing       Assessment/Plan:   Mr. Morris has a functioning left arm fistula which is causing pain and based on his physical examination likely due to outflow stenosis.  I am recommending that we proceed to a left arm fistulogram with possible angioplasty or stenting.  I have discussed with the patient in  detail the mechanics of the procedure, the indications, benefits, risks, alternatives as well as potential complications to include but not limited to infection, bleeding, inability to improve the fistula, vascular injury requiring surgical repair.  He appears to understand and desires to proceed.        Electronically signed by Livan Choi MD, 04/19/24, 1:53 PM EDT.

## 2024-04-25 PROCEDURE — S0260 H&P FOR SURGERY: HCPCS | Performed by: SURGERY

## 2024-04-25 NOTE — H&P
[facility]   HISTORY AND PHYSICAL    Patient Name: Radha Hutchison  : 1970  MRN: [id number]  Primary Care Physician:  Provider, No Known  Date of admission: (Not on file)    Subjective   Subjective     Chief Complaint: Malfunctioning left arm fistula    HPI:    Radha Hutchison is a 53 y.o. male with constant pain in his left arm along the left arm fistula.  At this time for fistulogram with possible endovascular intervention.    Review of Systems    Non contributory except for the History of Present Illness    Personal History     Past Medical History:   Diagnosis Date    CHF (congestive heart failure) May 2021    no recent issues    Coronary artery disease May 2021    follows w/[facility], no c/o cp or soa, workup 2022, instructed to come back in a year    End stage renal failure on dialysis     currently using right side  TDC, dialysis MWF    History of COVID-19 2020    Hypertension 1990       Past Surgical History:   Procedure Laterality Date    APPENDECTOMY      ARTERIOVENOUS FISTULA/SHUNT SURGERY Left 2022    Procedure: CREATION OF ARTERIOVENOUS FISTULA LEFT ARM;  Surgeon: Livan Choi MD;  Location: [facility] MAIN OR;  Service: Vascular;  Laterality: Left;    INSERTION HEMODIALYSIS CATHETER Right 2022    Procedure: HEMODIALYSIS CATHETER INSERTION;  Surgeon: Mikey Perez MD;  Location: [facility] MAIN OR;  Service: Vascular;  Laterality: Right;    INSERTION HEMODIALYSIS CATHETER N/A 10/24/2022    Procedure: HEMODIALYSIS CATHETER INSERTION exchange over guidewire.;  Surgeon: Mikey Perez MD;  Location: [facility] MAIN OR;  Service: Vascular;  Laterality: N/A;    INSERTION PERITONEAL DIALYSIS CATHETER      INSERTION PERITONEAL DIALYSIS CATHETER N/A 2022    Procedure: DIAGNOSTIC LAPAROSCOPY,  peritoneal dialysis pexy;  Surgeon: Danie Morocho MD;  Location: [facility] MAIN OR;  Service: General;  Laterality: N/A;    REMOVAL PERITONEAL DIALYSIS CATHETER N/A 10/20/2022     Procedure: REMOVAL PERITONEAL DIALYSIS CATHETER;  Surgeon: Matthew Arshad MD;  Location: [facility];  Service: General;  Laterality: N/A;       Family History: family history includes Cancer in his mother; Heart disease in his maternal grandfather; Hypertension in his mother. Otherwise pertinent FHx was reviewed and not pertinent to current issue.    Social History:  reports that he quit smoking about 2 years ago. His smoking use included cigarettes. He started smoking about 37 years ago. He has a 34.9 pack-year smoking history. He has never used smokeless tobacco. He reports that he does not drink alcohol and does not use drugs.    Home Medications:  No current facility-administered medications on file prior to encounter.     Current Outpatient Medications on File Prior to Encounter   Medication Sig    aspirin 81 MG EC tablet Take 1 tablet by mouth Daily.    carvedilol (COREG) 25 MG tablet Take 1 tablet by mouth 2 (Two) Times a Day.    cloNIDine (CATAPRES) 0.2 MG tablet Take 1 tablet by mouth 3 (Three) Times a Day.    eplerenone (INSPRA) 25 MG tablet Take 1 tablet by mouth Daily.    furosemide (LASIX) 40 MG tablet Take 1 tablet by mouth 2 (Two) Times a Day.    hydrALAZINE (APRESOLINE) 100 MG tablet Take 1 tablet by mouth 3 (Three) Times a Day.    NIFEdipine XL (PROCARDIA XL) 90 MG 24 hr tablet Take 1 tablet by mouth 2 (Two) Times a Day. as directed    oxyCODONE-acetaminophen (Percocet) 5-325 MG per tablet Take 1 tablet by mouth Every 6 (Six) Hours As Needed for Severe Pain.    polyethylene glycol (MIRALAX) 17 g packet Take 17 g by mouth 2 (Two) Times a Day As Needed (constipation).    traMADol (ULTRAM) 50 MG tablet Take 1 tablet by mouth Every 12 (Twelve) Hours As Needed for Moderate Pain.    vitamin D (ERGOCALCIFEROL) 1.25 MG (94514 UT) capsule capsule Take 1 capsule by mouth 1 (One) Time Per Week. MONDAYS          Allergies:  Allergies   Allergen Reactions    Coconut Anaphylaxis    Morphine Anaphylaxis and  Swelling       Objective   Objective     Vitals:        Physical Exam    General: Awake, alert, NAD   Eyes:  TK   Neck: Supple   Lungs: Clear   Heart: RRR   Abdomen: benign   Musculoskeletal:  normal motor tone, symmetric   Skin: warm, normal turgor   Neuro: strength 5/5 all extremities   Left arm fistula: Hyper pulsatile, enlarged.        Assessment & Plan   Assessment / Plan     Active Hospital Problems:  Active Hospital Problems    Diagnosis     ESRD on dialysis     Dialysis AV fistula malfunction, initial encounter        Diagnoses and all orders for this visit:    1. ESRD on dialysis  -     Cardiac Catheterization/Vascular Study; Standing  -     Cardiac Catheterization/Vascular Study    2. Dialysis AV fistula malfunction, initial encounter  -     Cardiac Catheterization/Vascular Study; Standing  -     Cardiac Catheterization/Vascular Study        Assessment/plan:   Mr. Morris has a functioning left arm fistula which is causing pain and based on his physical examination likely due to outflow stenosis. I am recommending that we proceed to a left arm fistulogram with possible angioplasty or stenting. I have discussed with the patient in detail the mechanics of the procedure, the indications, benefits, risks, alternatives as well as potential complications to include but not limited to infection, bleeding, inability to improve the fistula, vascular injury requiring surgical repair. He appears to understand and desires to proceed.       Electronically signed by Livan Choi MD, 04/25/24, 3:08 PM EDT.

## 2024-04-26 ENCOUNTER — HOSPITAL ENCOUNTER (OUTPATIENT)
Facility: HOSPITAL | Age: 54
Setting detail: HOSPITAL OUTPATIENT SURGERY
Discharge: HOME | End: 2024-04-26
Attending: SURGERY | Admitting: SURGERY
Payer: MEDICARE

## 2024-04-26 VITALS
OXYGEN SATURATION: 96 % | TEMPERATURE: 98.5 F | SYSTOLIC BLOOD PRESSURE: 146 MMHG | WEIGHT: 169.75 LBS | DIASTOLIC BLOOD PRESSURE: 90 MMHG | RESPIRATION RATE: 16 BRPM | BODY MASS INDEX: 25.14 KG/M2 | HEART RATE: 72 BPM | HEIGHT: 69 IN

## 2024-04-26 DIAGNOSIS — T82.590A DIALYSIS AV FISTULA MALFUNCTION, INITIAL ENCOUNTER: ICD-10-CM

## 2024-04-26 DIAGNOSIS — N18.6 ESRD ON DIALYSIS: ICD-10-CM

## 2024-04-26 DIAGNOSIS — Z99.2 ESRD ON DIALYSIS: ICD-10-CM

## 2024-04-26 LAB
ANION GAP SERPL CALCULATED.3IONS-SCNC: 10.8 MMOL/L (ref 5–15)
BASOPHILS # BLD AUTO: 0.09 10*3/MM3 (ref 0–0.2)
BASOPHILS NFR BLD AUTO: 1.3 % (ref 0–1.5)
BUN SERPL-MCNC: 28 MG/DL (ref 6–20)
BUN/CREAT SERPL: 11.2 (ref 7–25)
CALCIUM SPEC-SCNC: 9.8 MG/DL (ref 8.6–10.5)
CHLORIDE SERPL-SCNC: 102 MMOL/L (ref 98–107)
CO2 SERPL-SCNC: 24.2 MMOL/L (ref 22–29)
CREAT SERPL-MCNC: 2.49 MG/DL (ref 0.76–1.27)
DEPRECATED RDW RBC AUTO: 46.5 FL (ref 37–54)
EGFRCR SERPLBLD CKD-EPI 2021: 30.5 ML/MIN/1.73
EOSINOPHIL # BLD AUTO: 0.58 10*3/MM3 (ref 0–0.4)
EOSINOPHIL NFR BLD AUTO: 8.3 % (ref 0.3–6.2)
ERYTHROCYTE [DISTWIDTH] IN BLOOD BY AUTOMATED COUNT: 15.5 % (ref 12.3–15.4)
GLUCOSE SERPL-MCNC: 106 MG/DL (ref 65–99)
HCT VFR BLD AUTO: 44.1 % (ref 37.5–51)
HGB BLD-MCNC: 14.6 G/DL (ref 13–17.7)
IMM GRANULOCYTES # BLD AUTO: 0.02 10*3/MM3 (ref 0–0.05)
IMM GRANULOCYTES NFR BLD AUTO: 0.3 % (ref 0–0.5)
LYMPHOCYTES # BLD AUTO: 2.09 10*3/MM3 (ref 0.7–3.1)
LYMPHOCYTES NFR BLD AUTO: 29.9 % (ref 19.6–45.3)
MCH RBC QN AUTO: 27.3 PG (ref 26.6–33)
MCHC RBC AUTO-ENTMCNC: 33.1 G/DL (ref 31.5–35.7)
MCV RBC AUTO: 82.6 FL (ref 79–97)
MONOCYTES # BLD AUTO: 0.73 10*3/MM3 (ref 0.1–0.9)
MONOCYTES NFR BLD AUTO: 10.5 % (ref 5–12)
NEUTROPHILS NFR BLD AUTO: 3.47 10*3/MM3 (ref 1.7–7)
NEUTROPHILS NFR BLD AUTO: 49.7 % (ref 42.7–76)
NRBC BLD AUTO-RTO: 0 /100 WBC (ref 0–0.2)
PLATELET # BLD AUTO: 291 10*3/MM3 (ref 140–450)
PMV BLD AUTO: 9.8 FL (ref 6–12)
POTASSIUM SERPL-SCNC: 4.8 MMOL/L (ref 3.5–5.2)
RBC # BLD AUTO: 5.34 10*6/MM3 (ref 4.14–5.8)
SODIUM SERPL-SCNC: 137 MMOL/L (ref 136–145)
WBC NRBC COR # BLD AUTO: 6.98 10*3/MM3 (ref 3.4–10.8)

## 2024-04-26 PROCEDURE — C1894 INTRO/SHEATH, NON-LASER: HCPCS | Performed by: SURGERY

## 2024-04-26 PROCEDURE — 25010000002 HEPARIN (PORCINE) PER 1000 UNITS: Performed by: SURGERY

## 2024-04-26 PROCEDURE — C1769 GUIDE WIRE: HCPCS | Performed by: SURGERY

## 2024-04-26 PROCEDURE — 85025 COMPLETE CBC W/AUTO DIFF WBC: CPT | Performed by: SURGERY

## 2024-04-26 PROCEDURE — 25510000001 IOPAMIDOL PER 1 ML: Performed by: SURGERY

## 2024-04-26 PROCEDURE — 76937 US GUIDE VASCULAR ACCESS: CPT | Performed by: SURGERY

## 2024-04-26 PROCEDURE — C1725 CATH, TRANSLUMIN NON-LASER: HCPCS | Performed by: SURGERY

## 2024-04-26 PROCEDURE — 99152 MOD SED SAME PHYS/QHP 5/>YRS: CPT | Performed by: SURGERY

## 2024-04-26 PROCEDURE — 80048 BASIC METABOLIC PNL TOTAL CA: CPT | Performed by: SURGERY

## 2024-04-26 PROCEDURE — 36902 INTRO CATH DIALYSIS CIRCUIT: CPT | Performed by: SURGERY

## 2024-04-26 PROCEDURE — 99153 MOD SED SAME PHYS/QHP EA: CPT | Performed by: SURGERY

## 2024-04-26 PROCEDURE — 25010000002 FENTANYL CITRATE (PF) 50 MCG/ML SOLUTION: Performed by: SURGERY

## 2024-04-26 PROCEDURE — 25010000002 MIDAZOLAM PER 1MG: Performed by: SURGERY

## 2024-04-26 RX ORDER — FENTANYL CITRATE 50 UG/ML
INJECTION, SOLUTION INTRAMUSCULAR; INTRAVENOUS
Status: DISCONTINUED | OUTPATIENT
Start: 2024-04-26 | End: 2024-04-26 | Stop reason: HOSPADM

## 2024-04-26 RX ORDER — HEPARIN SODIUM 1000 [USP'U]/ML
INJECTION, SOLUTION INTRAVENOUS; SUBCUTANEOUS
Status: DISCONTINUED | OUTPATIENT
Start: 2024-04-26 | End: 2024-04-26 | Stop reason: HOSPADM

## 2024-04-26 RX ORDER — LIDOCAINE HYDROCHLORIDE 20 MG/ML
INJECTION, SOLUTION INFILTRATION; PERINEURAL
Status: DISCONTINUED | OUTPATIENT
Start: 2024-04-26 | End: 2024-04-26 | Stop reason: HOSPADM

## 2024-04-26 RX ORDER — MIDAZOLAM HYDROCHLORIDE 2 MG/2ML
INJECTION, SOLUTION INTRAMUSCULAR; INTRAVENOUS
Status: DISCONTINUED | OUTPATIENT
Start: 2024-04-26 | End: 2024-04-26 | Stop reason: HOSPADM

## 2024-04-26 NOTE — Clinical Note
Manual pressure applied to vessel. Manual pressure was held by Zen. Manual pressure was held for 10 min. Hemostasis achieved successfully.

## 2024-04-26 NOTE — PROGRESS NOTES
Fistulogram performed.  Findings consistent with moderate stenosis at 2 locations of the cephalic arch.  These were angioplastied to 8 mm with limited improvement, and 10 mm with complete resolution of the waist on the balloon with modest radiographic improvement, excellent clinical improvement.  A 12 mm balloon was also used but the decision was made not to reach profile due to patient pain.  For details find full report under chart review, cardiology tab.

## 2024-04-26 NOTE — Clinical Note
A 4 fr sheath was successfully inserted using micropuncture technique with ultrasound guidance Sheath insertion not delayed.

## 2024-04-26 NOTE — DISCHARGE INSTRUCTIONS
May use left arm fistula for hemodialysis  May remove dressing and wash area tomorrow.  Dialysis unit to remove stitches in 3 days.  If unable to have stitches removed by the dialysis unit, call the office to arrange.  Resume home diet.  Resume home medications.  Follow-up with me if persistent left arm pain.

## 2024-05-24 ENCOUNTER — HOSPITAL ENCOUNTER (INPATIENT)
Facility: HOSPITAL | Age: 54
LOS: 1 days | Discharge: HOME | DRG: 252 | End: 2024-05-25
Attending: EMERGENCY MEDICINE | Admitting: INTERNAL MEDICINE
Payer: MEDICARE

## 2024-05-24 ENCOUNTER — APPOINTMENT (OUTPATIENT)
Facility: HOSPITAL | Age: 54
DRG: 252 | End: 2024-05-24
Payer: MEDICARE

## 2024-05-24 DIAGNOSIS — T82.868A THROMBOSIS OF DIALYSIS VASCULAR ACCESS, INITIAL ENCOUNTER: ICD-10-CM

## 2024-05-24 DIAGNOSIS — T82.590D MALFUNCTION OF ARTERIOVENOUS DIALYSIS FISTULA, SUBSEQUENT ENCOUNTER: Primary | ICD-10-CM

## 2024-05-24 PROBLEM — T82.898A INADEQUATE FLOW OF HEMODIALYSIS AV FISTULA: Status: ACTIVE | Noted: 2024-05-24

## 2024-05-24 PROBLEM — T82.590A DIALYSIS AV FISTULA MALFUNCTION: Status: ACTIVE | Noted: 2024-05-24

## 2024-05-24 LAB
ALBUMIN SERPL-MCNC: 4.5 G/DL (ref 3.5–5.2)
ALBUMIN/GLOB SERPL: 1.4 G/DL
ALP SERPL-CCNC: 131 U/L (ref 39–117)
ALT SERPL W P-5'-P-CCNC: 12 U/L (ref 1–41)
ANION GAP SERPL CALCULATED.3IONS-SCNC: 9.8 MMOL/L (ref 5–15)
AST SERPL-CCNC: 13 U/L (ref 1–40)
BASOPHILS # BLD AUTO: 0.08 10*3/MM3 (ref 0–0.2)
BASOPHILS NFR BLD AUTO: 1 % (ref 0–1.5)
BH CV VAS DIALYSIS ARTERIAL ANASTOMOSIS DIAMETER: 0.82 CM
BH CV VAS DIALYSIS ARTERIAL ANASTOMOSIS EDV: 124 CM/SEC
BH CV VAS DIALYSIS ARTERIAL ANASTOMOSIS PSV: 229 CM/SEC
BH CV VAS DIALYSIS CONDUIT DIST DEPTH: 0.29 CM
BH CV VAS DIALYSIS CONDUIT DIST DIAMETER: 1.69 CM
BH CV VAS DIALYSIS CONDUIT DIST EDV: 89 CM/SEC
BH CV VAS DIALYSIS CONDUIT DIST FLOW VOL: 8789 ML/MIN
BH CV VAS DIALYSIS CONDUIT DIST PSV: 117 CM/SEC
BH CV VAS DIALYSIS CONDUIT MID DEPTH: 0.23 CM
BH CV VAS DIALYSIS CONDUIT MID DIAMETER: 1.83 CM
BH CV VAS DIALYSIS CONDUIT MID EDV: 24 CM/SEC
BH CV VAS DIALYSIS CONDUIT MID PSV: 74 CM/SEC
BH CV VAS DIALYSIS CONDUIT PROX DEPTH: 0.24 CM
BH CV VAS DIALYSIS CONDUIT PROX DIAMETER: 1.65 CM
BH CV VAS DIALYSIS CONDUIT PROX EDV: 141 CM/SEC
BH CV VAS DIALYSIS CONDUIT PROX PSV: 237 CM/SEC
BH CV VAS DIALYSIS CONDUIT PROX/MID FLOW VOL: 5 ML/MIN
BH CV VAS DIALYSIS PRE-INFLOW BRACHIAL DIAMETER: 1.07 CM
BH CV VAS DIALYSIS PRE-INFLOW BRACHIAL FLOW VOL: 29 ML/MIN
BH CV VAS DIALYSIS PRE-INFLOW BRACHIAL PSV: 157 CM/SEC
BH CV VAS DIALYSIS VENOUS OUTFLOW SUBCLAVIAN DIAMETER: 0.55 CM
BH CV VAS DIALYSIS VENOUS OUTFLOW SUBCLAVIAN EDV: 94 CM/SEC
BH CV VAS DIALYSIS VENOUS OUTFLOW SUBCLAVIAN PSV: 142 CM/SEC
BILIRUB SERPL-MCNC: 0.5 MG/DL (ref 0–1.2)
BUN SERPL-MCNC: 29 MG/DL (ref 6–20)
BUN/CREAT SERPL: 12.5 (ref 7–25)
CALCIUM SPEC-SCNC: 9.7 MG/DL (ref 8.6–10.5)
CHLORIDE SERPL-SCNC: 100 MMOL/L (ref 98–107)
CO2 SERPL-SCNC: 24.2 MMOL/L (ref 22–29)
CREAT SERPL-MCNC: 2.32 MG/DL (ref 0.76–1.27)
DEPRECATED RDW RBC AUTO: 49 FL (ref 37–54)
EGFRCR SERPLBLD CKD-EPI 2021: 33.2 ML/MIN/1.73
EOSINOPHIL # BLD AUTO: 0.36 10*3/MM3 (ref 0–0.4)
EOSINOPHIL NFR BLD AUTO: 4.6 % (ref 0.3–6.2)
ERYTHROCYTE [DISTWIDTH] IN BLOOD BY AUTOMATED COUNT: 16 % (ref 12.3–15.4)
GLOBULIN UR ELPH-MCNC: 3.3 GM/DL
GLUCOSE SERPL-MCNC: 94 MG/DL (ref 65–99)
HCT VFR BLD AUTO: 46.1 % (ref 37.5–51)
HGB BLD-MCNC: 15.1 G/DL (ref 13–17.7)
HOLD SPECIMEN: NORMAL
IMM GRANULOCYTES # BLD AUTO: 0.02 10*3/MM3 (ref 0–0.05)
IMM GRANULOCYTES NFR BLD AUTO: 0.3 % (ref 0–0.5)
LYMPHOCYTES # BLD AUTO: 1.26 10*3/MM3 (ref 0.7–3.1)
LYMPHOCYTES NFR BLD AUTO: 16.1 % (ref 19.6–45.3)
MCH RBC QN AUTO: 27.6 PG (ref 26.6–33)
MCHC RBC AUTO-ENTMCNC: 32.8 G/DL (ref 31.5–35.7)
MCV RBC AUTO: 84.3 FL (ref 79–97)
MONOCYTES # BLD AUTO: 0.67 10*3/MM3 (ref 0.1–0.9)
MONOCYTES NFR BLD AUTO: 8.5 % (ref 5–12)
NEUTROPHILS NFR BLD AUTO: 5.45 10*3/MM3 (ref 1.7–7)
NEUTROPHILS NFR BLD AUTO: 69.5 % (ref 42.7–76)
NRBC BLD AUTO-RTO: 0 /100 WBC (ref 0–0.2)
PLATELET # BLD AUTO: 264 10*3/MM3 (ref 140–450)
PMV BLD AUTO: 10.9 FL (ref 6–12)
POTASSIUM SERPL-SCNC: 4.8 MMOL/L (ref 3.5–5.2)
PROT SERPL-MCNC: 7.8 G/DL (ref 6–8.5)
RBC # BLD AUTO: 5.47 10*6/MM3 (ref 4.14–5.8)
SODIUM SERPL-SCNC: 134 MMOL/L (ref 136–145)
WBC NRBC COR # BLD AUTO: 7.84 10*3/MM3 (ref 3.4–10.8)
WHOLE BLOOD HOLD COAG: NORMAL

## 2024-05-24 PROCEDURE — C1725 CATH, TRANSLUMIN NON-LASER: HCPCS | Performed by: SURGERY

## 2024-05-24 PROCEDURE — 99222 1ST HOSP IP/OBS MODERATE 55: CPT | Performed by: SURGERY

## 2024-05-24 PROCEDURE — 03723Z1 DILATION OF INNOMINATE ARTERY USING DRUG-COATED BALLOON, PERCUTANEOUS APPROACH: ICD-10-PCS | Performed by: SURGERY

## 2024-05-24 PROCEDURE — 25010000002 HEPARIN (PORCINE) PER 1000 UNITS: Performed by: SURGERY

## 2024-05-24 PROCEDURE — 93990 DOPPLER FLOW TESTING: CPT | Performed by: SURGERY

## 2024-05-24 PROCEDURE — C1894 INTRO/SHEATH, NON-LASER: HCPCS | Performed by: SURGERY

## 2024-05-24 PROCEDURE — 99152 MOD SED SAME PHYS/QHP 5/>YRS: CPT | Performed by: SURGERY

## 2024-05-24 PROCEDURE — 36415 COLL VENOUS BLD VENIPUNCTURE: CPT

## 2024-05-24 PROCEDURE — C1887 CATHETER, GUIDING: HCPCS | Performed by: SURGERY

## 2024-05-24 PROCEDURE — 80053 COMPREHEN METABOLIC PANEL: CPT | Performed by: EMERGENCY MEDICINE

## 2024-05-24 PROCEDURE — 99285 EMERGENCY DEPT VISIT HI MDM: CPT

## 2024-05-24 PROCEDURE — 76937 US GUIDE VASCULAR ACCESS: CPT | Performed by: SURGERY

## 2024-05-24 PROCEDURE — 99222 1ST HOSP IP/OBS MODERATE 55: CPT | Performed by: INTERNAL MEDICINE

## 2024-05-24 PROCEDURE — 25010000002 MIDAZOLAM PER 1MG: Performed by: SURGERY

## 2024-05-24 PROCEDURE — 93990 DOPPLER FLOW TESTING: CPT

## 2024-05-24 PROCEDURE — C1769 GUIDE WIRE: HCPCS | Performed by: SURGERY

## 2024-05-24 PROCEDURE — 25010000002 FENTANYL CITRATE (PF) 100 MCG/2ML SOLUTION: Performed by: SURGERY

## 2024-05-24 PROCEDURE — 0 IODIXANOL PER 1 ML: Performed by: SURGERY

## 2024-05-24 PROCEDURE — 99153 MOD SED SAME PHYS/QHP EA: CPT | Performed by: SURGERY

## 2024-05-24 PROCEDURE — 85025 COMPLETE CBC W/AUTO DIFF WBC: CPT | Performed by: EMERGENCY MEDICINE

## 2024-05-24 PROCEDURE — 25010000002 CEFAZOLIN PER 500 MG: Performed by: SURGERY

## 2024-05-24 PROCEDURE — C2623 CATH, TRANSLUMIN, DRUG-COAT: HCPCS | Performed by: SURGERY

## 2024-05-24 PROCEDURE — 36902 INTRO CATH DIALYSIS CIRCUIT: CPT | Performed by: SURGERY

## 2024-05-24 RX ORDER — SODIUM CHLORIDE 0.9 % (FLUSH) 0.9 %
10 SYRINGE (ML) INJECTION EVERY 12 HOURS SCHEDULED
Status: DISCONTINUED | OUTPATIENT
Start: 2024-05-24 | End: 2024-05-25 | Stop reason: HOSPADM

## 2024-05-24 RX ORDER — IODIXANOL 320 MG/ML
INJECTION, SOLUTION INTRAVASCULAR
Status: DISCONTINUED | OUTPATIENT
Start: 2024-05-24 | End: 2024-05-24 | Stop reason: HOSPADM

## 2024-05-24 RX ORDER — FENTANYL CITRATE 50 UG/ML
INJECTION, SOLUTION INTRAMUSCULAR; INTRAVENOUS
Status: DISCONTINUED | OUTPATIENT
Start: 2024-05-24 | End: 2024-05-24 | Stop reason: HOSPADM

## 2024-05-24 RX ORDER — LIDOCAINE HYDROCHLORIDE 20 MG/ML
INJECTION, SOLUTION INFILTRATION; PERINEURAL
Status: DISCONTINUED | OUTPATIENT
Start: 2024-05-24 | End: 2024-05-24 | Stop reason: HOSPADM

## 2024-05-24 RX ORDER — HEPARIN SODIUM 1000 [USP'U]/ML
INJECTION, SOLUTION INTRAVENOUS; SUBCUTANEOUS
Status: DISCONTINUED | OUTPATIENT
Start: 2024-05-24 | End: 2024-05-24 | Stop reason: HOSPADM

## 2024-05-24 RX ORDER — MIDAZOLAM HYDROCHLORIDE 2 MG/2ML
INJECTION, SOLUTION INTRAMUSCULAR; INTRAVENOUS
Status: DISCONTINUED | OUTPATIENT
Start: 2024-05-24 | End: 2024-05-24 | Stop reason: HOSPADM

## 2024-05-24 RX ORDER — HYDRALAZINE HYDROCHLORIDE 50 MG/1
100 TABLET, FILM COATED ORAL 3 TIMES DAILY
Status: DISCONTINUED | OUTPATIENT
Start: 2024-05-24 | End: 2024-05-25 | Stop reason: HOSPADM

## 2024-05-24 RX ORDER — FUROSEMIDE 40 MG/1
40 TABLET ORAL 2 TIMES DAILY
Status: DISCONTINUED | OUTPATIENT
Start: 2024-05-24 | End: 2024-05-25 | Stop reason: HOSPADM

## 2024-05-24 RX ORDER — SODIUM CHLORIDE 9 MG/ML
40 INJECTION, SOLUTION INTRAVENOUS AS NEEDED
Status: DISCONTINUED | OUTPATIENT
Start: 2024-05-24 | End: 2024-05-25 | Stop reason: HOSPADM

## 2024-05-24 RX ORDER — SODIUM CHLORIDE 0.9 % (FLUSH) 0.9 %
10 SYRINGE (ML) INJECTION AS NEEDED
Status: DISCONTINUED | OUTPATIENT
Start: 2024-05-24 | End: 2024-05-25 | Stop reason: HOSPADM

## 2024-05-24 RX ORDER — NIFEDIPINE 90 MG/1
90 TABLET, FILM COATED, EXTENDED RELEASE ORAL 2 TIMES DAILY
COMMUNITY
Start: 2024-03-29

## 2024-05-24 RX ORDER — CARVEDILOL 25 MG/1
25 TABLET ORAL 2 TIMES DAILY
Status: DISCONTINUED | OUTPATIENT
Start: 2024-05-24 | End: 2024-05-25 | Stop reason: HOSPADM

## 2024-05-24 RX ORDER — CLONIDINE HYDROCHLORIDE 0.2 MG/1
0.2 TABLET ORAL 3 TIMES DAILY
Status: DISCONTINUED | OUTPATIENT
Start: 2024-05-24 | End: 2024-05-25 | Stop reason: HOSPADM

## 2024-05-24 RX ADMIN — HYDRALAZINE HYDROCHLORIDE 100 MG: 50 TABLET ORAL at 19:40

## 2024-05-24 RX ADMIN — CLONIDINE HYDROCHLORIDE 0.2 MG: 0.2 TABLET ORAL at 21:36

## 2024-05-24 RX ADMIN — FUROSEMIDE 40 MG: 40 TABLET ORAL at 21:36

## 2024-05-24 RX ADMIN — CLONIDINE HYDROCHLORIDE 0.2 MG: 0.2 TABLET ORAL at 19:39

## 2024-05-24 RX ADMIN — HYDRALAZINE HYDROCHLORIDE 100 MG: 50 TABLET ORAL at 21:36

## 2024-05-24 RX ADMIN — CARVEDILOL 25 MG: 25 TABLET, FILM COATED ORAL at 21:36

## 2024-05-24 RX ADMIN — Medication 10 ML: at 21:37

## 2024-05-24 NOTE — H&P
[facility]  Date: 2024   Patient Name: Radha Hutchison  : 1970  MRN: [id number]  Primary Care Physician:  Jack, No Known  Date of admission: 2024    Subjective   Subjective     Chief Complaint: Malfunctioning fistula    HPI:    Radha Hutchison is a 53 y.o. male with a PMH of CHF, CAD, end-stage renal disease, who presented to the ED today,  for issues with his left arm dialysis catheter. Patient states his symptoms started on  when he began experiencing pain during his dialysis treatment. Patient states his pain starts at the clavicle and radiates into his chest. He states he has not had a good treatment since . He states today they tried and could not get the pressures they needed. He denies fever, chills, N/V, abdominal pain, SOA. Sodium 134, BUN 29, Cr 2.32, GFR 33.2, /98.    Dr. Perez consulted and has spoken to the patient. Dr. Matthews, his nephrologist has been notified.         Personal History     Past Medical History:  Past Medical History:   Diagnosis Date    CHF (congestive heart failure) May 2021    no recent issues    Coronary artery disease May 2021    follows w/[facility], no c/o cp or soa, workup 2022, instructed to come back in a year    End stage renal failure on dialysis     currently using right side  TDC, dialysis T, T, S    History of COVID-19 2020    Hypertension Sept.     Myocardial infarction        Past Surgical History:  Past Surgical History:   Procedure Laterality Date    APPENDECTOMY      ARTERIOVENOUS FISTULA/SHUNT SURGERY Left 2022    Procedure: CREATION OF ARTERIOVENOUS FISTULA LEFT ARM;  Surgeon: Livan Choi MD;  Location: [facility] MAIN OR;  Service: Vascular;  Laterality: Left;    INSERTION HEMODIALYSIS CATHETER Right 2022    Procedure: HEMODIALYSIS CATHETER INSERTION;  Surgeon: Mikey Perez MD;  Location: [facility] MAIN OR;  Service: Vascular;  Laterality: Right;    INSERTION  HEMODIALYSIS CATHETER N/A 10/24/2022    Procedure: HEMODIALYSIS CATHETER INSERTION exchange over guidewire.;  Surgeon: Mikey Perez MD;  Location: [facility] MAIN OR;  Service: Vascular;  Laterality: N/A;    INSERTION PERITONEAL DIALYSIS CATHETER      INSERTION PERITONEAL DIALYSIS CATHETER N/A 2022    Procedure: DIAGNOSTIC LAPAROSCOPY,  peritoneal dialysis pexy;  Surgeon: Danie Morocho MD;  Location[facility] MAIN OR;  Service: General;  Laterality: N/A;    REMOVAL PERITONEAL DIALYSIS CATHETER N/A 10/20/2022    Procedure: REMOVAL PERITONEAL DIALYSIS CATHETER;  Surgeon: Matthew Arshad MD;  Location: [facility] OR [facility];  Service: General;  Laterality: N/A;    SHUNT O GRAM Left 2024    Procedure: Left arm fistulogram, possible angioplasty or stenting;  Surgeon: Livan Choi MD;  Location: [facility] CATH INVASIVE LOCATION;  Service: Peripheral Vascular;  Laterality: Left;       Family History:   Family History   Problem Relation Age of Onset    Cancer Mother     Hypertension Mother     Heart disease Maternal Grandfather     Malig Hyperthermia Neg Hx        Social History:   Social History     Socioeconomic History    Marital status: Single   Tobacco Use    Smoking status: Every Day     Current packs/day: 0.00     Average packs/day: 1 pack/day for 34.9 years (34.9 ttl pk-yrs)     Types: Cigarettes     Start date: 3/15/1987     Last attempt to quit: 2022     Years since quittin.2    Smokeless tobacco: Never    Tobacco comments:     Last smoked this am 0600    Vaping Use    Vaping status: Never Used   Substance and Sexual Activity    Alcohol use: Never    Drug use: Never    Sexual activity: Defer       Home Medications:  NIFEdipine CC, aspirin, carvedilol, cloNIDine, eplerenone, furosemide, and hydrALAZINE    Allergies:  Allergies   Allergen Reactions    Coconut Anaphylaxis    Morphine Anaphylaxis and Swelling       Review of Systems   All systems were reviewed and negative except for: those  stated in HPI.    Objective   Objective     Vitals:   Temp:  [97.9 °F (36.6 °C)] 97.9 °F (36.6 °C)  Heart Rate:  [66-71] 66  Resp:  [16] 16  BP: (145-152)/(85-98) 149/98    Physical Exam    Constitutional: Awake, alert, no acute distress   Eyes: Pupils equal, sclerae anicteric, no conjunctival injection   HENT: NCAT, mucous membranes moist   Neck: Supple, no thyromegaly, no lymphadenopathy, trachea midline   Respiratory: Clear to auscultation bilaterally, nonlabored respirations    Cardiovascular: RRR, no murmurs, rubs, or gallops, palpable pedal pulses bilaterally   Gastrointestinal: Positive bowel sounds, soft, nontender, nondistended   Musculoskeletal: No bilateral ankle edema, no clubbing or cyanosis to extremities   Psychiatric: Appropriate affect, cooperative   Neurologic: Oriented x 3, strength symmetric in all extremities, Cranial Nerves grossly intact to confrontation, speech clear   Skin: No rashes   Left ark fistula: Hyper pulsatile and enlarged.     Result Review    Result Review:  I have personally reviewed the results from the time of this admission to 5/24/2024 14:30 EDT and agree with these findings:  []  Laboratory  []  Microbiology  []  Radiology  []  EKG/Telemetry   []  Cardiology/Vascular   []  Pathology  []  Old records  []  Other:      Assessment & Plan   Assessment / Plan     Assessment:  Malfunctioning left arm fistula  Hypertension  CHF  CAD  End-stage renal disease      Plan:  Place for observation  Consulted vascular surgery  For HTN, will continue carvedilol, clonidine, nifedipine.  For CAD, will continue aspirin  For CHF, will continue furosemide, eplerenone, and hydralazine      DVT prophylaxis:  Mechanical DVT prophylaxis orders are signed and held.          CODE STATUS:         Admission Status:  I believe this patient meets observation status.    Electronically signed by Flaco Steel MD, 05/24/24, 2:11 PM EDT.

## 2024-05-24 NOTE — H&P
[facility] HISTORY AND PHYSICAL  Date: 2024   Patient Name: Radha Hutchison  : 1970  MRN: [id number]  Primary Care Physician:  Jack, Erma Known  Date of admission: 2024    Subjective   Subjective     Chief Complaint: Malfunctioning fistula    HPI:    Radha Hutchison is a 53 y.o. male with ESRD unable to get dialysis today due to malfunctioning fistula.  He has had pain when HD is attempted.  His last full treatment of dialysis was on 2024.  He has no other complaints.      Personal History     Past Medical History:  Past Medical History:   Diagnosis Date    CHF (congestive heart failure) May 2021    no recent issues    Coronary artery disease May 2021    follows w/[facility], no c/o cp or soa, workup 2022, instructed to come back in a year    End stage renal failure on dialysis     currently using right side  TDC, dialysis T, T, S    History of COVID-19 2020    Hypertension Sept.     Myocardial infarction        Past Surgical History:  Past Surgical History:   Procedure Laterality Date    APPENDECTOMY      ARTERIOVENOUS FISTULA/SHUNT SURGERY Left 2022    Procedure: CREATION OF ARTERIOVENOUS FISTULA LEFT ARM;  Surgeon: Livan Choi MD;  Location: [facility] OR;  Service: Vascular;  Laterality: Left;    INSERTION HEMODIALYSIS CATHETER Right 2022    Procedure: HEMODIALYSIS CATHETER INSERTION;  Surgeon: Mikey Perez MD;  Location: [facility] OR;  Service: Vascular;  Laterality: Right;    INSERTION HEMODIALYSIS CATHETER N/A 10/24/2022    Procedure: HEMODIALYSIS CATHETER INSERTION exchange over guidewire.;  Surgeon: Mikey Perez MD;  Location: [facility] OR;  Service: Vascular;  Laterality: N/A;    INSERTION PERITONEAL DIALYSIS CATHETER      INSERTION PERITONEAL DIALYSIS CATHETER N/A 2022    Procedure: DIAGNOSTIC LAPAROSCOPY,  peritoneal dialysis pexy;  Surgeon: Danie Morocho MD;  Location: [facility] OR;  Service: General;   Laterality: N/A;    REMOVAL PERITONEAL DIALYSIS CATHETER N/A 10/20/2022    Procedure: REMOVAL PERITONEAL DIALYSIS CATHETER;  Surgeon: Matthew Arshad MD;  [place] [facility] [place] [facility]  Service: General;  Laterality: N/A;    SHUNT O GRAM Left 2024    Procedure: Left arm fistulogram, possible angioplasty or stenting;  Surgeon: Livan Choi MD;  Location: [facility] CATH INVASIVE LOCATION;  Service: Peripheral Vascular;  Laterality: Left;       Family History:   Family History   Problem Relation Age of Onset    Cancer Mother     Hypertension Mother     Heart disease Maternal Grandfather     Malig Hyperthermia Neg Hx        Social History:   Social History     Socioeconomic History    Marital status: Single   Tobacco Use    Smoking status: Every Day     Current packs/day: 0.00     Average packs/day: 1 pack/day for 34.9 years (34.9 ttl pk-yrs)     Types: Cigarettes     Start date: 3/15/1987     Last attempt to quit: 2022     Years since quittin.2    Smokeless tobacco: Never    Tobacco comments:     Last smoked this am 0600    Vaping Use    Vaping status: Never Used   Substance and Sexual Activity    Alcohol use: Never    Drug use: Never    Sexual activity: Defer       Home Medications:  NIFEdipine CC, aspirin, carvedilol, cloNIDine, eplerenone, furosemide, and hydrALAZINE    Allergies:  Allergies   Allergen Reactions    Coconut Anaphylaxis    Morphine Anaphylaxis and Swelling         Objective   Objective     Vitals:   Temp:  [97.9 °F (36.6 °C)] 97.9 °F (36.6 °C)  Heart Rate:  [66-71] 66  Resp:  [16] 16  BP: (145-152)/(85-98) 149/98    Physical Exam    Constitutional: Awake, alert, no acute distress   Eyes: Pupils equal, sclerae anicteric, no conjunctival injection   HENT: NCAT, mucous membranes moist   Neck: Supple, no thyromegaly, no lymphadenopathy, trachea midline   Respiratory: Clear to auscultation bilaterally, nonlabored respirations    Cardiovascular: RRR, no murmurs, rubs, or gallops, palpable  pedal pulses bilaterally   Gastrointestinal: Positive bowel sounds, soft, nontender, nondistended   Musculoskeletal: LUE fistula    Result Review    Result Review:  I have personally reviewed the results from the time of this admission to 5/24/2024 14:11 EDT and agree with these findings:  [x]  Laboratory  CBC          5/14/2024    00:00 5/23/2024    00:00 5/24/2024    10:56   CBC   WBC   7.84    RBC   5.47    Hemoglobin 13.9        41.7     13.1        39.3     15.1    Hematocrit   46.1    MCV   84.3    MCH   27.6    MCHC   32.8    RDW   16.0    Platelets   264       Details          This result is from an external source.             CMP          4/26/2024    06:33 5/2/2024    00:00 5/24/2024    10:56   CMP   Glucose 106   94    BUN 28   29    Creatinine 2.49   2.32    EGFR 30.5   33.2    Sodium 137   134    Potassium 4.8   4.8    Chloride 102   100    Calcium 9.8  9.7     9.7    Total Protein   7.8    Albumin   4.5    Globulin   3.3    Total Bilirubin   0.5    Alkaline Phosphatase   131    AST (SGOT)   13    ALT (SGPT)   12    Albumin/Globulin Ratio   1.4    BUN/Creatinine Ratio 11.2   12.5    Anion Gap 10.8   9.8       Details          This result is from an external source.               []  Microbiology  []  Radiology  []  EKG/Telemetry   []  Cardiology/Vascular   []  Pathology  []  Old records  []  Other:      Assessment & Plan   Assessment / Plan     Assessment:   Hypertension  CAD, stable  CHF, stable  ESRD  Malfunctioning dialysis fistula    Plan:  Will admit to medicine  Continue home meds for hypertension  Continue home meds for CAD  Vascular surgery consulted to evaluate fistula  Nephrology for ESRD      DVT prophylaxis:  Mechanical DVT prophylaxis orders are signed and held.          CODE STATUS:         Admission Status:  I believe this patient meets inpatient status.    Electronically signed by Flaco Steel MD, 05/24/24, 2:11 PM EDT.

## 2024-05-24 NOTE — Clinical Note
A 7 fr sheath was successfully inserted using micropuncture technique with ultrasound guidance into the left brachial artery. Sheath insertion not delayed.

## 2024-05-24 NOTE — CONSULTS
Consults   [facility]   Vascular surgery consult    Patient Name: Radha Hutchison  : 1970  MRN: [id number]  Primary Care Physician:  Jack, Erma Known  Date of admission: 2024    Subjective   Subjective     Chief Complaint: Malfunctioning left arm fistula    HPI:    Radha Hutchison is a 53 y.o. male with constant pain in his left arm along the left arm fistula.  At this time for fistulogram with possible endovascular intervention.  He has not aneurysmal left brachiocephalic AV fistula created last year by Dr. Choi.  He underwent left upper extremity fistulogram with angioplasty of a stenosis at the cephalic arch on 2024 with up to an 12 mm balloon but this was unable to be taken up to profile due to the patient's discomfort.  He states that he has not undergone a complete dialysis run since the  due to high pressures mostly on the arterial return side.  He last ate or drank half of cup of coffee at 530 this morning.  He takes his blood pressure medications in the evening and took them last night.  He states he has lots of pain at the level of the shoulder during dialysis.  His nephrologist is Dr. Matthews.    Review of Systems    Non contributory except for the History of Present Illness    Personal History     Past Medical History:   Diagnosis Date    CHF (congestive heart failure) May 2021    no recent issues    Coronary artery disease May 2021    follows w/[facility], no c/o cp or soa, workup 2022, instructed to come back in a year    End stage renal failure on dialysis     currently using right side  TDC, dialysis T, T, S    History of COVID-19 2020    Hypertension Sept.     Myocardial infarction        Past Surgical History:   Procedure Laterality Date    APPENDECTOMY      ARTERIOVENOUS FISTULA/SHUNT SURGERY Left 2022    Procedure: CREATION OF ARTERIOVENOUS FISTULA LEFT ARM;  Surgeon: Livan Choi MD;  Location: [facility];  Service: Vascular;  Laterality: Left;     INSERTION HEMODIALYSIS CATHETER Right 9/2/2022    Procedure: HEMODIALYSIS CATHETER INSERTION;  Surgeon: Mikey Perez MD;  Location: [facility] MAIN OR;  Service: Vascular;  Laterality: Right;    INSERTION HEMODIALYSIS CATHETER N/A 10/24/2022    Procedure: HEMODIALYSIS CATHETER INSERTION exchange over guidewire.;  Surgeon: Mikey Perez MD;  Location: [facility] MAIN OR;  Service: Vascular;  Laterality: N/A;    INSERTION PERITONEAL DIALYSIS CATHETER      INSERTION PERITONEAL DIALYSIS CATHETER N/A 7/11/2022    Procedure: DIAGNOSTIC LAPAROSCOPY,  peritoneal dialysis pexy;  Surgeon: Danie Morocho MD;  [facility] MA[facility];  Service: General;  Laterality: N/A;    REMOVAL PERITONEAL DIALYSIS CATHETER N/A 10/20/2022    Procedure: REMOVAL PERITONEAL DIALYSIS CATHETER;  Surgeon: Matthew Arshad MD;  Location: [facility] OR [facility];  Service: General;  Laterality: N/A;    SHUNT O GRAM Left 4/26/2024    Procedure: Left arm fistulogram, possible angioplasty or stenting;  Surgeon: Livan Choi MD;  Location: [facility] CATH INVASIVE LOCATION;  Service: Peripheral Vascular;  Laterality: Left;       Family History: family history includes Cancer in his mother; Heart disease in his maternal grandfather; Hypertension in his mother. Otherwise pertinent FHx was reviewed and not pertinent to current issue.    Social History:  reports that he has been smoking cigarettes. He started smoking about 37 years ago. He has a 34.9 pack-year smoking history. He has never used smokeless tobacco. He reports that he does not drink alcohol and does not use drugs.    Home Medications:  No current facility-administered medications on file prior to encounter.     Current Outpatient Medications on File Prior to Encounter   Medication Sig    aspirin 81 MG EC tablet Take 1 tablet by mouth Daily.    carvedilol (COREG) 25 MG tablet Take 1 tablet by mouth 2 (Two) Times a Day.    cloNIDine (CATAPRES) 0.2 MG tablet Take 1 tablet by mouth 3 (Three) Times  a Day.    eplerenone (INSPRA) 25 MG tablet Take 1 tablet by mouth Daily.    furosemide (LASIX) 40 MG tablet Take 1 tablet by mouth 2 (Two) Times a Day.    hydrALAZINE (APRESOLINE) 100 MG tablet Take 1 tablet by mouth 3 (Three) Times a Day.    NIFEdipine CC (ADALAT CC) 90 MG 24 hr tablet Take 1 tablet by mouth 2 (Two) Times a Day.    [DISCONTINUED] NIFEdipine XL (PROCARDIA XL) 90 MG 24 hr tablet Take 1 tablet by mouth 2 (Two) Times a Day. as directed          Allergies:  Allergies   Allergen Reactions    Coconut Anaphylaxis    Morphine Anaphylaxis and Swelling       Objective   Objective     Vitals:   Temp:  [97.9 °F (36.6 °C)] 97.9 °F (36.6 °C)  Heart Rate:  [71] 71  Resp:  [16] 16  BP: (152)/(85) 152/85    Physical Exam    General: Awake, alert, NAD   Eyes:  TK   Neck: Supple   Lungs: Clear   Heart: RRR   Abdomen: benign   Musculoskeletal:  normal motor tone, symmetric   Skin: warm, normal turgor   Neuro: strength 5/5 all extremities   Left arm fistula: Hyper pulsatile, enlarged.  He has a good thrill proximally which diminishes at the midportion of the upper arm fistula and then returns near the clavicle and cephalic arch area.        Assessment & Plan   Assessment / Plan     Active Hospital Problems:  Active Hospital Problems    Diagnosis     **Inadequate flow of hemodialysis AV fistula            Assessment/plan:   Mr. Morris has a malfunctioning left arm fistula which is causing pain and based on his physical examination likely due to outflow stenosis. I am recommending that we proceed to a left arm fistulogram with possible angioplasty or stenting. I have discussed with the patient in detail the mechanics of the procedure, the indications, benefits, risks, alternatives as well as potential complications to include but not limited to infection, bleeding, inability to improve the fistula, vascular injury requiring surgical repair. He appears to understand and desires to proceed.   2.  We will see if we can  work the patient in for his fistulogram this afternoon.  However if not he may require it on tomorrow.  3.  Thereafter he will need to have a run of dialysis to during his hospital stay to confirmed it is functioning well prior to discharge and if not a tunneled dialysis catheter could be placed and he can be seen as an outpatient for potential revision down the line.    Thank you for the opportunity see his patient in consultation.

## 2024-05-24 NOTE — Clinical Note
Prepped with: ChloraPrep. The patient was draped in a sterile fashion. Left upper arm fistula prepped

## 2024-05-24 NOTE — CONSULTS
[facility]   Nephrology Consult Note      Patient Name: Radha Hutchison  : 1970  MRN: [id number]  Primary Care Physician:  Provider, No Known  Referring Physician: No ref. provider found  Date of admission: 2024    Subjective   Subjective     Reason for Consult/ Chief Complaint: End-stage renal disease with AV fistula malfunction.    HPI:  Radha Hutchison is a 53 y.o. male with history of end-stage renal disease on dialysis 3 times a week under the care of Dr. Barnett, being admitted for malfunction of AV fistula.  Status post fistulogram and PTA.  He has been having severe pain with dialysis.  Last full treatment was on 2024.  Patient is known to me from before.  He moved to [place] for a while but returned to [place] in March.  Denies shortness of breath and chest pain.  No nausea or vomiting.    Review of Systems   All systems were reviewed and negative except for: What is mentioned above.    Personal History     Past Medical History:   Diagnosis Date    CHF (congestive heart failure) May 2021    no recent issues    Coronary artery disease May 2021    follows w/[facility], no c/o cp or soa, workup 2022, instructed to come back in a year    End stage renal failure on dialysis     currently using right side  TDC, dialysis T, T, S    History of COVID-19 2020    Hypertension Sept.     Myocardial infarction        Past Surgical History:   Procedure Laterality Date    APPENDECTOMY      ARTERIOVENOUS FISTULA/SHUNT SURGERY Left 2022    Procedure: CREATION OF ARTERIOVENOUS FISTULA LEFT ARM;  Surgeon: Livan Choi MD;  Location: [facility] MAIN OR;  Service: Vascular;  Laterality: Left;    INSERTION HEMODIALYSIS CATHETER Right 2022    Procedure: HEMODIALYSIS CATHETER INSERTION;  Surgeon: Mikey Perez MD;  Location: [facility] MAIN OR;  Service: Vascular;  Laterality: Right;    INSERTION HEMODIALYSIS CATHETER N/A 10/24/2022    Procedure: HEMODIALYSIS CATHETER INSERTION exchange over  guidewire.;  Surgeon: Mikey Perez MD;  Location: [facility] MAIN OR;  Service: Vascular;  Laterality: N/A;    INSERTION PERITONEAL DIALYSIS CATHETER      INSERTION PERITONEAL DIALYSIS CATHETER N/A 7/11/2022    Procedure: DIAGNOSTIC LAPAROSCOPY,  peritoneal dialysis pexy;  Surgeon: Danie Morocho MD;  [facility];  Service: General;  Laterality: N/A;    REMOVAL PERITONEAL DIALYSIS CATHETER N/A 10/20/2022    Procedure: REMOVAL PERITONEAL DIALYSIS CATHETER;  Surgeon: Matthew Arshad MD;  Location: [facility] OR [facility];  Service: General;  Laterality: N/A;    SHUNT O GRAM Left 4/26/2024    Procedure: Left arm fistulogram, possible angioplasty or stenting;  Surgeon: Livan Choi MD;  Location: [facility] CATH INVASIVE LOCATION;  Service: Peripheral Vascular;  Laterality: Left;       Family History: family history includes Cancer in his mother; Heart disease in his maternal grandfather; Hypertension in his mother. Otherwise pertinent FHx was reviewed and not pertinent to current issue.    Social History:  reports that he has been smoking cigarettes. He started smoking about 37 years ago. He has a 34.9 pack-year smoking history. He has never used smokeless tobacco. He reports that he does not drink alcohol and does not use drugs.    Home Medications:  NIFEdipine CC, aspirin, carvedilol, cloNIDine, eplerenone, furosemide, and hydrALAZINE    Allergies:  Allergies   Allergen Reactions    Coconut Anaphylaxis    Morphine Anaphylaxis and Swelling       Objective    Objective     Vitals:   Temp:  [97.9 °F (36.6 °C)] 97.9 °F (36.6 °C)  Heart Rate:  [65-71] 65  Resp:  [16] 16  BP: (145-152)/(85-98) 148/98  Flow (L/min):  [2] 2     Physical Exam    Constitutional: Awake, alert, no distress, conversant and pleasant, lying flat on Cath Lab table.   Eyes: sclerae anicteric, no conjunctival injection   HENT: mucous membranes moist   Neck: Supple, no thyromegaly, no lymphadenopathy, trachea midline, No  JVD   Respiratory: Clear to auscultation bilaterally, nonlabored respirations    Cardiovascular: RRR, no murmurs, rubs, or gallops.  Patent left upper extremity AV fistula, well-developed.   Gastrointestinal: Positive bowel sounds, soft, nontender, nondistended   Musculoskeletal: Trace edema, no clubbing or cyanosis   Psychiatric: Appropriate affect, cooperative   Neurologic: Oriented x 3, moving all extremities, Cranial Nerves grossly intact, speech clear   Skin: warm and dry, no rashes     Result Review    Result Reviewed:  I have personally reviewed the results from the time of this admission to 5/24/2024 17:25 EDT and agree with these findings:  [x]  Laboratory  []  Microbiology  [x]  Radiology  []  EKG/Telemetry   []  Cardiology/Vascular   []  Pathology  [x]  Old records  []  Other:  LAB RESULTS:    LAB RESULTS:        Lab 05/24/24  1056   SODIUM 134*   POTASSIUM 4.8   CHLORIDE 100   CO2 24.2   BUN 29*   CREATININE 2.32*   GLUCOSE 94   EGFR 33.2*   ANION GAP 9.8           Most notable findings include: Potassium 4.8.    Assessment & Plan   Assessment / Plan     Brief Patient Summary:  Radha Hutchison is a 53 y.o. male who has end-stage renal disease, malfunctioning left upper extremity AV fistula.    Active Hospital Problems:  Active Hospital Problems    Diagnosis     **Inadequate flow of hemodialysis AV fistula     Dialysis AV fistula malfunction        Assessment and Plan:   - ESRD, dialysis normally TTS schedule last full dialysis 5/16/2024.  Has been having trouble with his AV fistula in the last week or so.  Electrolytes reviewed, volume status adequate.  Plan dialysis tomorrow in the morning.  If no significant issues, patient can be discharged home and follow-up as outpatient otherwise vascular may need TDC placement.  - Left upper extremity AV fistula malfunction, status post fistulogram and angioplasty of arch stenosis.  Appreciate vascular help.  - Hypertension, resume blood pressure medications.  -  History of coronary disease.  Discussed with vascular.  Will follow.      Thank you very much for this consult!    Electronically signed by Jonathon Vallejo MD, 5/24/2024, 17:25 EDT.

## 2024-05-24 NOTE — Clinical Note
Hemostasis started on the left subclavian artery. Purse string suturing and figure 8 suturing was used in achieving hemostasis. Closure device deployed in the vessel. Hemostasis achieved successfully.

## 2024-05-24 NOTE — PAYOR COMM NOTE
"Radha Hutchison (53 y.o. Male)       Date of Birth   1970    Social Security Number       Address   [address] [facility] [place] [address]    Home Phone   [phone]    MRN   [id number]       Presybeterian   None    Marital Status   Single                            Admission Date   5/24/24    Admission Type   Emergency    Admitting Provider       Attending Provider   Everardo Pino MD    Department, Room/Bed   [facility] EMERGENCY ROOM, 07/07       Discharge Date       Discharge Disposition       Discharge Destination                                 Attending Provider: Everardo Pino MD    Allergies: Coconut, Morphine    Isolation: None   Infection: None   Code Status: Not on file    Ht: 175.3 cm (69\")   Wt: 73.5 kg (162 lb)    Admission Cmt: None   Principal Problem: Inadequate flow of hemodialysis AV fistula [T82.898A]                   Active Insurance as of 5/24/2024       Primary Coverage       Payor Plan Insurance Group Employer/Plan Group    ANTHEM MEDICARE REPLACEMENT ANTHEM MEDICARE ADVANTAGE [id number]       Payor Plan Address Payor Plan Phone Number Payor Plan Fax Number Effective Dates    [address] [phone]  1/1/2023 - None Entered    [facility] [address]         Subscriber Name Subscriber Birth Date Member ID       RADHA HUTCHISON 1970 [id number]               Secondary Coverage       Payor Plan Insurance Group Employer/Plan Group    [id number]        Payor Plan Address Payor Plan Phone Number Payor Plan Fax Number Effective Dates    [address]   7/1/2022 - None Entered    [facility] [address]         Subscriber Name Subscriber Birth Date Member ID       RADHA HUTCHISON 1970 [id number]                     Emergency Contacts        (Rel.) Home Phone Work Phone Mobile Phone    Anahi Winslow (Friend) -- -- [phone]    Drannon,Jerinne (Mother) -- -- [phone]    CHAI WINSLOW " (Friend) -- -- [phone]             Infection, Thrombosis, or Other Complication of Intravenous Device RRG Inpatient Care       Indications Met   Last updated by Chandrika Powers on 5/24/2024 4188     Review Status Created By   Primary Completed Chandrika Powers      Criteria Review   Infection, Thrombosis, or Other Complication of Intravenous Device RRG Inpatient Care     Overall Determination: Indications Met     Criteria:  [×] Admission is indicated for  1 or more  of the following :      [×] Catheter-related complication that cannot be managed on outpatient basis, as indicated by  1 or more  of the following :          [×] Other catheter-related complication that cannot be managed on outpatient basis              5/24/2024  1:07 PM                  -- 5/24/2024  1:07 PM by Chandrika Powers --                      Pt. has malfunctioning left arm fistula which is causing pain and based on his physical exam likely d/t outflow stenosis. Recommend that we proceed with fistulogram with possible agioplasty or stenting.     Notes:  -- 5/24/2024  1:07 PM by Chandrika Powers --      Vascular surgery consult: Assessment/plan:       Mr. Morris has a malfunctioning left arm fistula which is causing pain and based on his physical examination likely due to outflow stenosis. I am recommending that we proceed to a left arm fistulogram with possible angioplasty or stenting. I have discussed with the patient in detail the mechanics of the procedure, the indications, benefits, risks, alternatives as well as potential complications to include but not limited to infection, bleeding, inability to improve the fistula, vascular injury requiring surgical repair. He appears to understand and desires to proceed.       2. We will see if we can work the patient in for his fistulogram this afternoon. However if not he may require it on tomorrow.      3. Thereafter he will need to have a run of dialysis to during his hospital stay to  confirmed it is functioning well prior to discharge and if not a tunneled dialysis catheter could be placed and he can be seen as an outpatient for potential revision down the line.            Thank you for the opportunity see his patient in consultation.                                           Debbie VALERIO [id number] Tax ID [id number]  Problem List             Codes Noted - Resolved       Hospital    * (Principal) Inadequate flow of hemodialysis AV fistula ICD-10-CM: T82.898A  ICD-9-CM: 996.73 5/24/2024 - Present       Non-Hospital    ESRD on dialysis ICD-10-CM: N18.6, Z99.2  ICD-9-CM: 585.6, V45.11 4/19/2024 - Present    Dialysis AV fistula malfunction, initial encounter ICD-10-CM: T82.590A  ICD-9-CM: 996.1 4/19/2024 - Present    Chronic kidney disease ICD-10-CM: N18.9  ICD-9-CM: 585.9 10/24/2022 - Present    End stage renal failure on dialysis ICD-10-CM: N18.6, Z99.2  ICD-9-CM: 585.6, V45.11 9/1/2022 - Present    CHF (congestive heart failure) ICD-10-CM: I50.9  ICD-9-CM: 428.0 7/11/2022 - Present    Hypertension ICD-10-CM: I10  ICD-9-CM: 401.9 7/11/2022 - Present    CAD (coronary artery disease) ICD-10-CM: I25.10  ICD-9-CM: 414.00 7/11/2022 - Present     History & Physical    No notes of this type exist for this encounter.          Emergency Department Notes        Everardo Pino MD at 05/24/24 1056          Time: 10:56 AM EDT  Date of encounter:  5/24/2024  Independent Historian/Clinical History and Information was obtained by:   Patient    History is limited by: N/A    Chief Complaint: Vascular access issue      History of Present Illness:  Patient is a 53 y.o. year old male who presents to the emergency department for evaluation of vascular access issue.  Patient has a history of CHF, coronary artery disease, end-stage renal disease who presents with complaints of issues with his dialysis catheter.  He states he has not been able to get a good treatment because of issues with his left upper extremity  fistula.  He states that they tried again today and were not getting the pressures that they needed.  He has pain up near her shoulder when they started.  No other complaints this time.    HPI    Patient Care Team  Primary Care Provider: Provider, No Known    Past Medical History:     Allergies   Allergen Reactions    Coconut Anaphylaxis    Morphine Anaphylaxis and Swelling     Past Medical History:   Diagnosis Date    CHF (congestive heart failure) May 2021    no recent issues    Coronary artery disease May 2021    follows w/[facility], no c/o cp or soa, workup 09/2022, instructed to come back in a year    End stage renal failure on dialysis     currently using right side  TDC, dialysis T, T, S    History of COVID-19 09/2020    Hypertension Sept. 1990    Myocardial infarction      Past Surgical History:   Procedure Laterality Date    APPENDECTOMY  1994    ARTERIOVENOUS FISTULA/SHUNT SURGERY Left 11/8/2022    Procedure: CREATION OF ARTERIOVENOUS FISTULA LEFT ARM;  Surgeon: Livan Choi MD;  Location: [facility] MAIN OR;  Service: Vascular;  Laterality: Left;    INSERTION HEMODIALYSIS CATHETER Right 9/2/2022    Procedure: HEMODIALYSIS CATHETER INSERTION;  Surgeon: Mikey Perez MD;  Location: [facility] MAIN OR;  Service: Vascular;  Laterality: Right;    INSERTION HEMODIALYSIS CATHETER N/A 10/24/2022    Procedure: HEMODIALYSIS CATHETER INSERTION exchange over guidewire.;  Surgeon: Mikey Perez MD;  Location: [facility] MAIN OR;  Service: Vascular;  Laterality: N/A;    INSERTION PERITONEAL DIALYSIS CATHETER      INSERTION PERITONEAL DIALYSIS CATHETER N/A 7/11/2022    Procedure: DIAGNOSTIC LAPAROSCOPY,  peritoneal dialysis pexy;  Surgeon: Danie Morocho MD;  Location: [facility] OR;  Service: General;  Laterality: N/A;    REMOVAL PERITONEAL DIALYSIS CATHETER N/A 10/20/2022    Procedure: REMOVAL PERITONEAL DIALYSIS CATHETER;  Surgeon: Matthew Arshad MD;  Location: [facility];  Service: General;  Laterality:  N/A;    SHUNT O GRAM Left 2024    Procedure: Left arm fistulogram, possible angioplasty or stenting;  Surgeon: Livan Choi MD;  Location: [facility] INVASIVE LOCATION;  Service: Peripheral Vascular;  Laterality: Left;     Family History   Problem Relation Age of Onset    Cancer Mother     Hypertension Mother     Heart disease Maternal Grandfather     Malig Hyperthermia Neg Hx        Home Medications:  Prior to Admission medications    Medication Sig Start Date End Date Taking? Authorizing Provider   aspirin 81 MG EC tablet Take 1 tablet by mouth Daily. 22   Nick Santiago MD   carvedilol (COREG) 25 MG tablet Take 1 tablet by mouth 2 (Two) Times a Day. 3/15/22   Nick Santiago MD   cloNIDine (CATAPRES) 0.2 MG tablet Take 1 tablet by mouth 3 (Three) Times a Day. 22   Nick Santiago MD   eplerenone (INSPRA) 25 MG tablet Take 1 tablet by mouth Daily. 22   Nick Santiago MD   furosemide (LASIX) 40 MG tablet Take 1 tablet by mouth 2 (Two) Times a Day. 22   Nick Santiago MD   hydrALAZINE (APRESOLINE) 100 MG tablet Take 1 tablet by mouth 3 (Three) Times a Day. 22   Nick Santiago MD   NIFEdipine XL (PROCARDIA XL) 90 MG 24 hr tablet Take 1 tablet by mouth 2 (Two) Times a Day. as directed 22   Nick Santiago MD        Social History:   Social History     Tobacco Use    Smoking status: Every Day     Current packs/day: 0.00     Average packs/day: 1 pack/day for 34.9 years (34.9 ttl pk-yrs)     Types: Cigarettes     Start date: 3/15/1987     Last attempt to quit: 2022     Years since quittin.2    Smokeless tobacco: Never    Tobacco comments:     Last smoked this am 0600    Vaping Use    Vaping status: Never Used   Substance Use Topics    Alcohol use: Never    Drug use: Never         Review of Systems:  Review of Systems   Cardiovascular:         Vascular access issue        Physical Exam:  /85 (BP Location: Left arm,  "Patient Position: Sitting)   Pulse 71   Temp 97.9 °F (36.6 °C)   Resp 16   Ht 175.3 cm (69\")   Wt 73.5 kg (162 lb)   SpO2 100%   BMI 23.92 kg/m²     Physical Exam  Vitals and nursing note reviewed.   Constitutional:       Appearance: Normal appearance.   HENT:      Head: Normocephalic and atraumatic.   Eyes:      General: No scleral icterus.  Cardiovascular:      Rate and Rhythm: Normal rate and regular rhythm.      Heart sounds: Normal heart sounds.   Pulmonary:      Effort: Pulmonary effort is normal.      Breath sounds: Normal breath sounds.   Abdominal:      Palpations: Abdomen is soft.      Tenderness: There is no abdominal tenderness.   Musculoskeletal:         General: Normal range of motion.      Cervical back: Normal range of motion.      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  There is a thrill up near the collarbone but no significant thrill over the bicep.   Skin:     Findings: No rash.   Neurological:      General: No focal deficit present.      Mental Status: He is alert.                  Procedures:  Procedures      Medical Decision Making:      Comorbidities that affect care:    Chronic Kidney Disease, Coronary Artery Disease, Hypertension    External Notes reviewed:    Reviewed vascular note from 4/26/2024      The following orders were placed and all results were independently analyzed by me:  Orders Placed This Encounter   Procedures    Comprehensive Metabolic Panel    CBC Auto Differential    Inpatient Vascular Surgery Consult    Inpatient Hospitalist Consult    Inpatient Admission    CBC & Differential    Extra Tubes    Gold Top - SST    Light Blue Top       Medications Given in the Emergency Department:  Medications - No data to display     ED Course:    ED Course as of 05/24/24 1211   Fri May 24, 2024   1106 Spoke with Dr. Perez who recommends ultrasound as well as admission to the hospital [MA]   1204 " Spoke with Dr. Steel who agrees to admit [MA]      ED Course User Index  [MA] Everardo Pino MD       Labs:    Lab Results (last 24 hours)       Procedure Component Value Units Date/Time    Comprehensive Metabolic Panel [[id number]]  (Abnormal) Collected: 05/24/24 1056    Specimen: Blood Updated: 05/24/24 1134     Glucose 94 mg/dL      BUN 29 mg/dL      Creatinine 2.32 mg/dL      Sodium 134 mmol/L      Potassium 4.8 mmol/L      Chloride 100 mmol/L      CO2 24.2 mmol/L      Calcium 9.7 mg/dL      Total Protein 7.8 g/dL      Albumin 4.5 g/dL      ALT (SGPT) 12 U/L      AST (SGOT) 13 U/L      Alkaline Phosphatase 131 U/L      Total Bilirubin 0.5 mg/dL      Globulin 3.3 gm/dL      A/G Ratio 1.4 g/dL      BUN/Creatinine Ratio 12.5     Anion Gap 9.8 mmol/L      eGFR 33.2 mL/min/1.73     Narrative:      GFR Normal >60  Chronic Kidney Disease <60  Kidney Failure <15      CBC & Differential [[id number]]  (Abnormal) Collected: 05/24/24 1056    Specimen: Blood Updated: 05/24/24 1123    Narrative:      The following orders were created for panel order CBC & Differential.  Procedure                               Abnormality         Status                     ---------                               -----------         ------                     CBC Auto Differential[[id number]]        Abnormal            Final result                 Please view results for these tests on the individual orders.    CBC Auto Differential [[id number]]  (Abnormal) Collected: 05/24/24 1056    Specimen: Blood Updated: 05/24/24 1123     WBC 7.84 10*3/mm3      RBC 5.47 10*6/mm3      Hemoglobin 15.1 g/dL      Hematocrit 46.1 %      MCV 84.3 fL      MCH 27.6 pg      MCHC 32.8 g/dL      RDW 16.0 %      RDW-SD 49.0 fl      MPV 10.9 fL      Platelets 264 10*3/mm3      Neutrophil % 69.5 %      Lymphocyte % 16.1 %      Monocyte % 8.5 %      Eosinophil % 4.6 %      Basophil % 1.0 %      Immature Grans % 0.3 %      Neutrophils, Absolute 5.45 10*3/mm3       Lymphocytes, Absolute 1.26 10*3/mm3      Monocytes, Absolute 0.67 10*3/mm3      Eosinophils, Absolute 0.36 10*3/mm3      Basophils, Absolute 0.08 10*3/mm3      Immature Grans, Absolute 0.02 10*3/mm3      nRBC 0.0 /100 WBC              Imaging:    No Radiology Exams Resulted Within Past 24 Hours      Differential Diagnosis and Discussion:    Differential includes but not limited to metabolic abnormality, vascular abnormality, pseudoaneurysm, clogged fistula, dialysis complication    All labs were reviewed and interpreted by me.    MDM     Patient is a 53-year-old gentleman who presents with complaints of left upper extremity complications.  Was not able to complete dialysis this morning.  Was sent here for further eval.  I spoke with vascular surgery who requested an ultrasound also will likely take to the Cath Lab.  Will admit to the hospital further workup manage.          Patient Care Considerations:          Consultants/Shared Management Plan:    Hospitalist: I have discussed the case with Dr. Steel who agrees to accept the patient for admission.  Consultant: I have discussed the case with Dr. Perez who agrees to consult on the patient.    Social Determinants of Health:          Disposition and Care Coordination:    Admit:   Through independent evaluation of the patient's history, physical, and imperical data, the patient meets criteria for inpatient admission to the hospital.        Final diagnoses:   Thrombosis of dialysis vascular access, initial encounter        ED Disposition       ED Disposition   Decision to Admit    Condition   --    Comment   Level of Care: Med/Surg [1]   Diagnosis: Inadequate flow of hemodialysis AV fistula [[id number]]   Certification: I Certify That Inpatient Hospital Services Are Medically Necessary For Greater Than 2 Midnights                 This medical record created using voice recognition software.             Everardo Pino MD  05/24/24 6178      Electronically signed by  Everardo Pino MD at 05/24/24 1255       Vital Signs (last day)       Date/Time Temp Temp src Pulse Resp BP Patient Position SpO2    05/24/24 1300 -- -- 66 -- 149/98 -- 98    05/24/24 1200 -- -- 66 -- 145/97 -- 99    05/24/24 1100 -- -- 66 -- 151/97 -- --    05/24/24 0940 97.9 (36.6) -- 71 16 152/85 Sitting 100          No current facility-administered medications on file as of 5/24/2024.     Orders (last 24 hrs)        Start     Ordered    05/24/24 1206  Inpatient Admission  Once         05/24/24 1210    05/24/24 1158  Duplex Hemodialysis Access CAR  Once         05/24/24 1107    05/24/24 1110  Extra Tubes  Once         05/24/24 1110    05/24/24 1110  Gold Top - SST  PROCEDURE ONCE         05/24/24 1110    05/24/24 1110  Light Blue Top  PROCEDURE ONCE         05/24/24 1110    05/24/24 1108  Inpatient Hospitalist Consult  Once        Specialty:  Hospitalist  Provider:  (Not yet assigned)    05/24/24 1107    05/24/24 1108  Duplex Upper Extremity Art / Grafts - Left CAR  Once,   Status:  Canceled         05/24/24 1107    05/24/24 1059  Inpatient Vascular Surgery Consult  Once        Specialty:  Vascular Surgery  Provider:  Mikey Perez MD    05/24/24 1058    05/24/24 1028  Comprehensive Metabolic Panel  Once         05/24/24 1027    05/24/24 1028  CBC & Differential  Once         05/24/24 1027    05/24/24 1028  CBC Auto Differential  PROCEDURE ONCE         05/24/24 1027    03/29/24 0000  NIFEdipine CC (ADALAT CC) 90 MG 24 hr tablet  2 Times Daily         05/24/24 1108    Signed and Held  carvedilol (COREG) tablet 25 mg  2 Times Daily         Signed and Held    Signed and Held  cloNIDine (CATAPRES) tablet 0.2 mg  3 Times Daily         Signed and Held    Signed and Held  furosemide (LASIX) tablet 40 mg  2 Times Daily         Signed and Held    Signed and Held  hydrALAZINE (APRESOLINE) tablet 100 mg  3 Times Daily         Signed and Held    Signed and Held  Vital Signs  Every 4 Hours       Signed and Held     Signed and Held  Up in Chair  As Needed         Signed and Held    Signed and Held  Intake & Output  Every Shift       Signed and Held    Signed and Held  Weigh Patient  Once         Signed and Held    Signed and Held  Oral Care  2 Times Daily       Signed and Held    Signed and Held  Basic Metabolic Panel  Morning Draw         Signed and Held    Signed and Held  CBC Auto Differential  Morning Draw         Signed and Held    Signed and Held  Insert Peripheral IV  Once         Signed and Held    Signed and Held  Saline Lock & Maintain IV Access  Continuous         Signed and Held    Signed and Held  sodium chloride 0.9 % flush 10 mL  Every 12 Hours Scheduled         Signed and Held    Signed and Held  sodium chloride 0.9 % flush 10 mL  As Needed         Signed and Held    Signed and Held  sodium chloride 0.9 % infusion 40 mL  As Needed         Signed and Held    Signed and Held  Place Sequential Compression Device  Once         Signed and Held    Signed and Held  Maintain Sequential Compression Device  Continuous         Signed and Held    Signed and Held  Bowel Regimen Not Indicated  Once         Signed and Held    Signed and Held  Inpatient Nephrology Consult  Once        Specialty:  Nephrology  Provider:  Jonathon Vallejo MD    Signed and Held    Signed and Held  NPO Diet NPO Type: Strict NPO  Diet Effective Now         Signed and Held                  Physician Progress Notes (last 24 hours)  Notes from 24 1310 through 24 1310   No notes of this type exist for this encounter.          Consult Notes (last 24 hours)        Mikey Perez MD at 24 1257          Consults   [facility]   Vascular surgery consult    Patient Name: Radha Hutchison  : 1970  MRN: [id number]  Primary Care Physician:  Provider, No Known  Date of admission: 2024    Subjective   Subjective     Chief Complaint: Malfunctioning left arm fistula    HPI:    Radha Hutchison is a 53 y.o. male with constant  pain in his left arm along the left arm fistula.  At this time for fistulogram with possible endovascular intervention.  He has not aneurysmal left brachiocephalic AV fistula created last year by Dr. Choi.  He underwent left upper extremity fistulogram with angioplasty of a stenosis at the cephalic arch on 4/26/2024 with up to an 12 mm balloon but this was unable to be taken up to profile due to the patient's discomfort.  He states that he has not undergone a complete dialysis run since the 16th due to high pressures mostly on the arterial return side.  He last ate or drank half of cup of coffee at 530 this morning.  He takes his blood pressure medications in the evening and took them last night.  He states he has lots of pain at the level of the shoulder during dialysis.  His nephrologist is Dr. Matthews.    Review of Systems    Non contributory except for the History of Present Illness    Personal History     Past Medical History:   Diagnosis Date    CHF (congestive heart failure) May 2021    no recent issues    Coronary artery disease May 2021    follows w/[facility], no c/o cp or soa, workup 09/2022, instructed to come back in a year    End stage renal failure on dialysis     currently using right side  TDC, dialysis T, T, S    History of COVID-19 09/2020    Hypertension Sept. 1990    Myocardial infarction        Past Surgical History:   Procedure Laterality Date    APPENDECTOMY  1994    ARTERIOVENOUS FISTULA/SHUNT SURGERY Left 11/8/2022    Procedure: CREATION OF ARTERIOVENOUS FISTULA LEFT ARM;  Surgeon: Livan Choi MD;  Location: [facility] OR;  Service: Vascular;  Laterality: Left;    INSERTION HEMODIALYSIS CATHETER Right 9/2/2022    Procedure: HEMODIALYSIS CATHETER INSERTION;  Surgeon: Mikey Perez MD;  Location: [facility] OR;  Service: Vascular;  Laterality: Right;    INSERTION HEMODIALYSIS CATHETER N/A 10/24/2022    Procedure: HEMODIALYSIS CATHETER INSERTION exchange over guidewire.;  Surgeon: Chris  MD Mikey;  Location: [facility] MAIN OR;  Service: Vascular;  Laterality: N/A;    INSERTION PERITONEAL DIALYSIS CATHETER      INSERTION PERITONEAL DIALYSIS CATHETER N/A 7/11/2022    Procedure: DIAGNOSTIC LAPAROSCOPY,  peritoneal dialysis pexy;  Surgeon: Danie Morocho MD;  Location: [facility] MAIN OR;  Service: General;  Laterality: N/A;    REMOVAL PERITONEAL DIALYSIS CATHETER N/A 10/20/2022    Procedure: REMOVAL PERITONEAL DIALYSIS CATHETER;  Surgeon: Matthew Arshad MD;  Location: [facility] OR [facility];  Service: General;  Laterality: N/A;    SHUNT O GRAM Left 4/26/2024    Procedure: Left arm fistulogram, possible angioplasty or stenting;  Surgeon: Livan Choi MD;  Location: [facility] CATH INVASIVE LOCATION;  Service: Peripheral Vascular;  Laterality: Left;       Family History: family history includes Cancer in his mother; Heart disease in his maternal grandfather; Hypertension in his mother. Otherwise pertinent FHx was reviewed and not pertinent to current issue.    Social History:  reports that he has been smoking cigarettes. He started smoking about 37 years ago. He has a 34.9 pack-year smoking history. He has never used smokeless tobacco. He reports that he does not drink alcohol and does not use drugs.    Home Medications:  No current facility-administered medications on file prior to encounter.     Current Outpatient Medications on File Prior to Encounter   Medication Sig    aspirin 81 MG EC tablet Take 1 tablet by mouth Daily.    carvedilol (COREG) 25 MG tablet Take 1 tablet by mouth 2 (Two) Times a Day.    cloNIDine (CATAPRES) 0.2 MG tablet Take 1 tablet by mouth 3 (Three) Times a Day.    eplerenone (INSPRA) 25 MG tablet Take 1 tablet by mouth Daily.    furosemide (LASIX) 40 MG tablet Take 1 tablet by mouth 2 (Two) Times a Day.    hydrALAZINE (APRESOLINE) 100 MG tablet Take 1 tablet by mouth 3 (Three) Times a Day.    NIFEdipine CC (ADALAT CC) 90 MG 24 hr tablet Take 1 tablet by mouth 2 (Two) Times a Day.     [DISCONTINUED] NIFEdipine XL (PROCARDIA XL) 90 MG 24 hr tablet Take 1 tablet by mouth 2 (Two) Times a Day. as directed          Allergies:  Allergies   Allergen Reactions    Coconut Anaphylaxis    Morphine Anaphylaxis and Swelling       Objective   Objective     Vitals:   Temp:  [97.9 °F (36.6 °C)] 97.9 °F (36.6 °C)  Heart Rate:  [71] 71  Resp:  [16] 16  BP: (152)/(85) 152/85    Physical Exam    General: Awake, alert, NAD   Eyes:  TK   Neck: Supple   Lungs: Clear   Heart: RRR   Abdomen: benign   Musculoskeletal:  normal motor tone, symmetric   Skin: warm, normal turgor   Neuro: strength 5/5 all extremities   Left arm fistula: Hyper pulsatile, enlarged.  He has a good thrill proximally which diminishes at the midportion of the upper arm fistula and then returns near the clavicle and cephalic arch area.        Assessment & Plan   Assessment / Plan     Active Hospital Problems:  Active Hospital Problems    Diagnosis     **Inadequate flow of hemodialysis AV fistula            Assessment/plan:   Mr. Morris has a malfunctioning left arm fistula which is causing pain and based on his physical examination likely due to outflow stenosis. I am recommending that we proceed to a left arm fistulogram with possible angioplasty or stenting. I have discussed with the patient in detail the mechanics of the procedure, the indications, benefits, risks, alternatives as well as potential complications to include but not limited to infection, bleeding, inability to improve the fistula, vascular injury requiring surgical repair. He appears to understand and desires to proceed.   2.  We will see if we can work the patient in for his fistulogram this afternoon.  However if not he may require it on tomorrow.  3.  Thereafter he will need to have a run of dialysis to during his hospital stay to confirmed it is functioning well prior to discharge and if not a tunneled dialysis catheter could be placed and he can be seen as an outpatient  for potential revision down the line.    Thank you for the opportunity see his patient in consultation.    Electronically signed by Mikey Perez MD at 05/24/24 9272

## 2024-05-24 NOTE — POST-PROCEDURE NOTE
Patient tolerated percutaneous angioplasty of his left brachiocephalic AV fistula in the Cath Lab well with no complications or difficulties.  The patient is to undergo dialysis tomorrow morning.  This was discussed with Dr. Vallejo per nephrology directly after the procedure.  If his fistula functions well he can be discharged thereafter.  However if there is any issues with the fistula we can be notified and a tunneled dialysis catheter can be placed for dialysis with subsequent re-evaluation of the fistula.  Further details of the procedure can be seen in the Cath Lab note under the cardiovascular tab in chart review.  He will be made n.p.o. after midnight in case any further intervention or total dialysis catheter is required tomorrow.

## 2024-05-24 NOTE — ED PROVIDER NOTES
Time: 10:56 AM EDT  Date of encounter:  5/24/2024  Independent Historian/Clinical History and Information was obtained by:   Patient    History is limited by: N/A    Chief Complaint: Vascular access issue      History of Present Illness:  Patient is a 53 y.o. year old male who presents to the emergency department for evaluation of vascular access issue.  Patient has a history of CHF, coronary artery disease, end-stage renal disease who presents with complaints of issues with his dialysis catheter.  He states he has not been able to get a good treatment because of issues with his left upper extremity fistula.  He states that they tried again today and were not getting the pressures that they needed.  He has pain up near her shoulder when they started.  No other complaints this time.    HPI    Patient Care Team  Primary Care Provider: Provider, No Known    Past Medical History:     Allergies   Allergen Reactions    Coconut Anaphylaxis    Morphine Anaphylaxis and Swelling     Past Medical History:   Diagnosis Date    CHF (congestive heart failure) May 2021    no recent issues    Coronary artery disease May 2021    follows w/[facility], no c/o cp or soa, workup 09/2022, instructed to come back in a year    End stage renal failure on dialysis     currently using right side  TDC, dialysis T, T, S    History of COVID-19 09/2020    Hypertension Sept. 1990    Myocardial infarction      Past Surgical History:   Procedure Laterality Date    APPENDECTOMY  1994    ARTERIOVENOUS FISTULA/SHUNT SURGERY Left 11/8/2022    Procedure: CREATION OF ARTERIOVENOUS FISTULA LEFT ARM;  Surgeon: Livan Choi MD;  Location: [facility] MAIN OR;  Service: Vascular;  Laterality: Left;    INSERTION HEMODIALYSIS CATHETER Right 9/2/2022    Procedure: HEMODIALYSIS CATHETER INSERTION;  Surgeon: Mikey Perez MD;  Location: [facility] MAIN OR;  Service: Vascular;  Laterality: Right;    INSERTION HEMODIALYSIS CATHETER N/A 10/24/2022    Procedure: HEMODIALYSIS  CATHETER INSERTION exchange over guidewire.;  Surgeon: Mikey Perez MD;  Location: [facility] MAIN OR;  Service: Vascular;  Laterality: N/A;    INSERTION PERITONEAL DIALYSIS CATHETER      INSERTION PERITONEAL DIALYSIS CATHETER N/A 7/11/2022    Procedure: DIAGNOSTIC LAPAROSCOPY,  peritoneal dialysis pexy;  Surgeon: Danie Morocho MD;  Location: [facility] MAIN OR;  Service: General;  Laterality: N/A;    REMOVAL PERITONEAL DIALYSIS CATHETER N/A 10/20/2022    Procedure: REMOVAL PERITONEAL DIALYSIS CATHETER;  Surgeon: Matthew Arshad MD;  Location: [facility] OR OSC;  Service: General;  Laterality: N/A;    SHUNT O GRAM Left 4/26/2024    Procedure: Left arm fistulogram, possible angioplasty or stenting;  Surgeon: Livan Choi MD;  Location: [facility] CATH INVASIVE LOCATION;  Service: Peripheral Vascular;  Laterality: Left;     Family History   Problem Relation Age of Onset    Cancer Mother     Hypertension Mother     Heart disease Maternal Grandfather     Malig Hyperthermia Neg Hx        Home Medications:  Prior to Admission medications    Medication Sig Start Date End Date Taking? Authorizing Provider   aspirin 81 MG EC tablet Take 1 tablet by mouth Daily. 1/27/22   Nick Santiago MD   carvedilol (COREG) 25 MG tablet Take 1 tablet by mouth 2 (Two) Times a Day. 3/15/22   Nick Santiago MD   cloNIDine (CATAPRES) 0.2 MG tablet Take 1 tablet by mouth 3 (Three) Times a Day. 7/5/22   Nick Santiago MD   eplerenone (INSPRA) 25 MG tablet Take 1 tablet by mouth Daily. 5/16/22   Nick Santiago MD   furosemide (LASIX) 40 MG tablet Take 1 tablet by mouth 2 (Two) Times a Day. 1/27/22   Nick Santiago MD   hydrALAZINE (APRESOLINE) 100 MG tablet Take 1 tablet by mouth 3 (Three) Times a Day. 4/7/22   Nick Santiago MD   NIFEdipine XL (PROCARDIA XL) 90 MG 24 hr tablet Take 1 tablet by mouth 2 (Two) Times a Day. as directed 5/16/22   Nick Santiago MD        Social History:   Social  "History     Tobacco Use    Smoking status: Every Day     Current packs/day: 0.00     Average packs/day: 1 pack/day for 34.9 years (34.9 ttl pk-yrs)     Types: Cigarettes     Start date: 3/15/1987     Last attempt to quit: 2022     Years since quittin.2    Smokeless tobacco: Never    Tobacco comments:     Last smoked this am 0600    Vaping Use    Vaping status: Never Used   Substance Use Topics    Alcohol use: Never    Drug use: Never         Review of Systems:  Review of Systems   Cardiovascular:         Vascular access issue        Physical Exam:  /85 (BP Location: Left arm, Patient Position: Sitting)   Pulse 71   Temp 97.9 °F (36.6 °C)   Resp 16   Ht 175.3 cm (69\")   Wt 73.5 kg (162 lb)   SpO2 100%   BMI 23.92 kg/m²     Physical Exam  Vitals and nursing note reviewed.   Constitutional:       Appearance: Normal appearance.   HENT:      Head: Normocephalic and atraumatic.   Eyes:      General: No scleral icterus.  Cardiovascular:      Rate and Rhythm: Normal rate and regular rhythm.      Heart sounds: Normal heart sounds.   Pulmonary:      Effort: Pulmonary effort is normal.      Breath sounds: Normal breath sounds.   Abdominal:      Palpations: Abdomen is soft.      Tenderness: There is no abdominal tenderness.   Musculoskeletal:         General: Normal range of motion.      Cervical back: Normal range of motion.      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  There is a thrill up near the collarbone but no significant thrill over the bicep.   Skin:     Findings: No rash.   Neurological:      General: No focal deficit present.      Mental Status: He is alert.                  Procedures:  Procedures      Medical Decision Making:      Comorbidities that affect care:    Chronic Kidney Disease, Coronary Artery Disease, Hypertension    External Notes reviewed:    Reviewed vascular note from 2024      The " following orders were placed and all results were independently analyzed by me:  Orders Placed This Encounter   Procedures    Comprehensive Metabolic Panel    CBC Auto Differential    Inpatient Vascular Surgery Consult    Inpatient Hospitalist Consult    Inpatient Admission    CBC & Differential    Extra Tubes    Gold Top - SST    Light Blue Top       Medications Given in the Emergency Department:  Medications - No data to display     ED Course:    ED Course as of 05/24/24 1211   Fri May 24, 2024   1106 Spoke with Dr. Perez who recommends ultrasound as well as admission to the hospital [MA]   1204 Spoke with Dr. Steel who agrees to admit [MA]      ED Course User Index  [MA] Everardo Pino MD       Labs:    Lab Results (last 24 hours)       Procedure Component Value Units Date/Time    Comprehensive Metabolic Panel [[id number]]  (Abnormal) Collected: 05/24/24 1056    Specimen: Blood Updated: 05/24/24 1134     Glucose 94 mg/dL      BUN 29 mg/dL      Creatinine 2.32 mg/dL      Sodium 134 mmol/L      Potassium 4.8 mmol/L      Chloride 100 mmol/L      CO2 24.2 mmol/L      Calcium 9.7 mg/dL      Total Protein 7.8 g/dL      Albumin 4.5 g/dL      ALT (SGPT) 12 U/L      AST (SGOT) 13 U/L      Alkaline Phosphatase 131 U/L      Total Bilirubin 0.5 mg/dL      Globulin 3.3 gm/dL      A/G Ratio 1.4 g/dL      BUN/Creatinine Ratio 12.5     Anion Gap 9.8 mmol/L      eGFR 33.2 mL/min/1.73     Narrative:      GFR Normal >60  Chronic Kidney Disease <60  Kidney Failure <15      CBC & Differential [[id number]]  (Abnormal) Collected: 05/24/24 1056    Specimen: Blood Updated: 05/24/24 1123    Narrative:      The following orders were created for panel order CBC & Differential.  Procedure                               Abnormality         Status                     ---------                               -----------         ------                     CBC Auto Differential[[id number]]        Abnormal            Final result                  Please view results for these tests on the individual orders.    CBC Auto Differential [[id number]]  (Abnormal) Collected: 05/24/24 1056    Specimen: Blood Updated: 05/24/24 1123     WBC 7.84 10*3/mm3      RBC 5.47 10*6/mm3      Hemoglobin 15.1 g/dL      Hematocrit 46.1 %      MCV 84.3 fL      MCH 27.6 pg      MCHC 32.8 g/dL      RDW 16.0 %      RDW-SD 49.0 fl      MPV 10.9 fL      Platelets 264 10*3/mm3      Neutrophil % 69.5 %      Lymphocyte % 16.1 %      Monocyte % 8.5 %      Eosinophil % 4.6 %      Basophil % 1.0 %      Immature Grans % 0.3 %      Neutrophils, Absolute 5.45 10*3/mm3      Lymphocytes, Absolute 1.26 10*3/mm3      Monocytes, Absolute 0.67 10*3/mm3      Eosinophils, Absolute 0.36 10*3/mm3      Basophils, Absolute 0.08 10*3/mm3      Immature Grans, Absolute 0.02 10*3/mm3      nRBC 0.0 /100 WBC              Imaging:    No Radiology Exams Resulted Within Past 24 Hours      Differential Diagnosis and Discussion:    Differential includes but not limited to metabolic abnormality, vascular abnormality, pseudoaneurysm, clogged fistula, dialysis complication    All labs were reviewed and interpreted by me.    MDM     Patient is a 53-year-old gentleman who presents with complaints of left upper extremity complications.  Was not able to complete dialysis this morning.  Was sent here for further eval.  I spoke with vascular surgery who requested an ultrasound also will likely take to the Cath Lab.  Will admit to the hospital further workup manage.          Patient Care Considerations:          Consultants/Shared Management Plan:    Hospitalist: I have discussed the case with Dr. Steel who agrees to accept the patient for admission.  Consultant: I have discussed the case with Dr. Perez who agrees to consult on the patient.    Social Determinants of Health:          Disposition and Care Coordination:    Admit:   Through independent evaluation of the patient's history, physical, and imperical data, the patient  meets criteria for inpatient admission to the hospital.        Final diagnoses:   Thrombosis of dialysis vascular access, initial encounter        ED Disposition       ED Disposition   Decision to Admit    Condition   --    Comment   Level of Care: Med/Surg [1]   Diagnosis: Inadequate flow of hemodialysis AV fistula [[id number]]   Certification: I Certify That Inpatient Hospital Services Are Medically Necessary For Greater Than 2 Midnights                 This medical record created using voice recognition software.             Everardo Pino MD  05/24/24 8193

## 2024-05-25 ENCOUNTER — READMISSION MANAGEMENT (OUTPATIENT)
Dept: CALL CENTER | Facility: HOSPITAL | Age: 54
End: 2024-05-25
Payer: MEDICARE

## 2024-05-25 VITALS
HEIGHT: 69 IN | OXYGEN SATURATION: 100 % | HEART RATE: 72 BPM | SYSTOLIC BLOOD PRESSURE: 121 MMHG | DIASTOLIC BLOOD PRESSURE: 85 MMHG | BODY MASS INDEX: 25.47 KG/M2 | RESPIRATION RATE: 16 BRPM | WEIGHT: 171.96 LBS | TEMPERATURE: 97.9 F

## 2024-05-25 LAB
ANION GAP SERPL CALCULATED.3IONS-SCNC: 11.5 MMOL/L (ref 5–15)
BASOPHILS # BLD AUTO: 0.06 10*3/MM3 (ref 0–0.2)
BASOPHILS NFR BLD AUTO: 1 % (ref 0–1.5)
BUN SERPL-MCNC: 41 MG/DL (ref 6–20)
BUN/CREAT SERPL: 16.5 (ref 7–25)
CALCIUM SPEC-SCNC: 8.7 MG/DL (ref 8.6–10.5)
CHLORIDE SERPL-SCNC: 104 MMOL/L (ref 98–107)
CO2 SERPL-SCNC: 22.5 MMOL/L (ref 22–29)
CREAT SERPL-MCNC: 2.49 MG/DL (ref 0.76–1.27)
DEPRECATED RDW RBC AUTO: 47.1 FL (ref 37–54)
EGFRCR SERPLBLD CKD-EPI 2021: 30.5 ML/MIN/1.73
EOSINOPHIL # BLD AUTO: 0.24 10*3/MM3 (ref 0–0.4)
EOSINOPHIL NFR BLD AUTO: 3.9 % (ref 0.3–6.2)
ERYTHROCYTE [DISTWIDTH] IN BLOOD BY AUTOMATED COUNT: 15.6 % (ref 12.3–15.4)
GLUCOSE SERPL-MCNC: 99 MG/DL (ref 65–99)
HBV SURFACE AG SERPL QL IA: NORMAL
HCT VFR BLD AUTO: 39.2 % (ref 37.5–51)
HGB BLD-MCNC: 12.8 G/DL (ref 13–17.7)
IMM GRANULOCYTES # BLD AUTO: 0.02 10*3/MM3 (ref 0–0.05)
IMM GRANULOCYTES NFR BLD AUTO: 0.3 % (ref 0–0.5)
LYMPHOCYTES # BLD AUTO: 1.21 10*3/MM3 (ref 0.7–3.1)
LYMPHOCYTES NFR BLD AUTO: 19.6 % (ref 19.6–45.3)
MCH RBC QN AUTO: 27 PG (ref 26.6–33)
MCHC RBC AUTO-ENTMCNC: 32.7 G/DL (ref 31.5–35.7)
MCV RBC AUTO: 82.7 FL (ref 79–97)
MONOCYTES # BLD AUTO: 0.73 10*3/MM3 (ref 0.1–0.9)
MONOCYTES NFR BLD AUTO: 11.8 % (ref 5–12)
NEUTROPHILS NFR BLD AUTO: 3.91 10*3/MM3 (ref 1.7–7)
NEUTROPHILS NFR BLD AUTO: 63.4 % (ref 42.7–76)
NRBC BLD AUTO-RTO: 0 /100 WBC (ref 0–0.2)
PLATELET # BLD AUTO: 207 10*3/MM3 (ref 140–450)
PMV BLD AUTO: 10.9 FL (ref 6–12)
POTASSIUM SERPL-SCNC: 4.5 MMOL/L (ref 3.5–5.2)
RBC # BLD AUTO: 4.74 10*6/MM3 (ref 4.14–5.8)
SODIUM SERPL-SCNC: 138 MMOL/L (ref 136–145)
WBC NRBC COR # BLD AUTO: 6.17 10*3/MM3 (ref 3.4–10.8)

## 2024-05-25 PROCEDURE — 5A1D70Z PERFORMANCE OF URINARY FILTRATION, INTERMITTENT, LESS THAN 6 HOURS PER DAY: ICD-10-PCS | Performed by: INTERNAL MEDICINE

## 2024-05-25 PROCEDURE — 36415 COLL VENOUS BLD VENIPUNCTURE: CPT | Performed by: INTERNAL MEDICINE

## 2024-05-25 PROCEDURE — 80048 BASIC METABOLIC PNL TOTAL CA: CPT | Performed by: INTERNAL MEDICINE

## 2024-05-25 PROCEDURE — 87340 HEPATITIS B SURFACE AG IA: CPT | Performed by: INTERNAL MEDICINE

## 2024-05-25 PROCEDURE — 25010000002 HEPARIN (PORCINE) PER 1000 UNITS: Performed by: INTERNAL MEDICINE

## 2024-05-25 PROCEDURE — 99238 HOSP IP/OBS DSCHRG MGMT 30/<: CPT | Performed by: INTERNAL MEDICINE

## 2024-05-25 PROCEDURE — 85025 COMPLETE CBC W/AUTO DIFF WBC: CPT | Performed by: INTERNAL MEDICINE

## 2024-05-25 RX ORDER — CLOPIDOGREL BISULFATE 75 MG/1
75 TABLET ORAL DAILY
Status: DISCONTINUED | OUTPATIENT
Start: 2024-05-25 | End: 2024-05-25 | Stop reason: HOSPADM

## 2024-05-25 RX ORDER — HEPARIN SODIUM 1000 [USP'U]/ML
2000 INJECTION, SOLUTION INTRAVENOUS; SUBCUTANEOUS ONCE
Status: COMPLETED | OUTPATIENT
Start: 2024-05-25 | End: 2024-05-25

## 2024-05-25 RX ORDER — CLOPIDOGREL BISULFATE 75 MG/1
75 TABLET ORAL DAILY
Qty: 30 TABLET | Refills: 0 | Status: SHIPPED | OUTPATIENT
Start: 2024-05-25 | End: 2024-05-25

## 2024-05-25 RX ORDER — CLOPIDOGREL BISULFATE 75 MG/1
75 TABLET ORAL DAILY
Qty: 30 TABLET | Refills: 11 | Status: SHIPPED | OUTPATIENT
Start: 2024-05-25 | End: 2025-05-25

## 2024-05-25 RX ADMIN — HEPARIN SODIUM 2000 UNITS: 1000 INJECTION INTRAVENOUS; SUBCUTANEOUS at 09:27

## 2024-05-25 RX ADMIN — Medication 1 APPLICATION: at 08:55

## 2024-05-25 NOTE — PROGRESS NOTES
[facility]     Progress Note    Patient Name: Radha Hutchison  : 1970  MRN: [id number]  Primary Care Physician:  Provider, No Known  Date of admission: 2024    Subjective   Subjective     Pt seen after dialysis and doing well.  Tolerated HD with no issues or pain today.    Objective   Objective     Vitals:   Temp:  [97.5 °F (36.4 °C)-99.1 °F (37.3 °C)] 97.9 °F (36.6 °C)  Heart Rate:  [63-74] 72  Resp:  [14-18] 16  BP: (116-174)/() 121/85  Flow (L/min):  [2] 2    Physical Exam   Constitutional: Awake, alert   Neck: Supple   Respiratory: Clear to auscultation bilaterally, nonlabored respirations    Cardiovascular: RRR, no murmurs   Abdomen: benign   Extremities: LUE BCAVF with good thrill throughout   Pulses: B radial pulses palp        Assessment & Plan   Assessment / Plan     Assessment/Plan:    54 yo CM POD # 1 S/p LUE fistulagram with angioplasty of cephalic arch outflow with DCB. DoIng well.  2.  Okay for DC from vascular surgery perspective.  3.  Needs plavix for at least 3 months if not idefinitely after intervention with DCB to avoid recurrent stenosis.  Rx changed for DC.  4.  Will make new appointment for follow up in 3-4 weeks.  Patient was previously scheduled to be seen in office next Tuesday prior to intervention so this will replace that appointment.  Patient aware and will have office call him for new appointment time.    Active Hospital Problems:  Active Hospital Problems    Diagnosis     **Inadequate flow of hemodialysis AV fistula     Dialysis AV fistula malfunction      Electronically signed by Mikey Perez MD, 24, 2:14 PM EDT.

## 2024-05-25 NOTE — NURSING NOTE
Report given to Vineet RODRIGUEZ. All questions addressed at this time.    Pt completed 4 hr dialysis treatment without difficulty. This RN was able to cannulate pt AVF without difficulty. Blood flow through both venous and arterial access sites was without difficulty. Pt  and AP stable throughout treatment. Pt denied pain with access or throughout tx. 3L removed and 96BLP.    Post tx pt access site was held for approx 15 min and pressure dressing applied.    This RN educated pt to watch the site for bleeding and to take the bandage off no later than tomorrow morning to avoid any damage to the fistula site. Pt verbalized understanding.    Pt last set VS: /85 HR 72    Pt awaiting transportation back to unit for discharge. Pt is alert and oriented x3 in NAD at this time.

## 2024-05-25 NOTE — PROGRESS NOTES
LOS: 1 day   Patient Care Team:  Provider, No Known as PCP - Jonathon Greenfield MD as Consulting Physician (Nephrology)    Chief Complaint:  Renal issues    Subjective     Interval History:     Patient Complaints: Chart reviewed.  No new issues overnight  Seen on HD  No issues with stick.  Blood flow 400  UF 3 L  2 K bath  Tolerating TX, no cramps/nausea    carvedilol, 25 mg, Oral, BID  cloNIDine, 0.2 mg, Oral, TID  clopidogrel, 75 mg, Oral, Daily  furosemide, 40 mg, Oral, BID  hydrALAZINE, 100 mg, Oral, TID  sodium chloride, 10 mL, Intravenous, Q12H           Review of Systems:   General : No pain, no chills  HEENT: No headache, no nosebleed, no sore throat, no blurry vision  Chest : no chest pain, no palpitations  Respiratory: No cough, no SOA, no hemoptysis  GI:  No N/V/D, no abdominal pain  Skin : no rashes, no pruritus  Musculoskeletal : no flank pain, no joint effusions  Neuro : No weakness, no dizziness, no focal deficits  Endocrine: no heat/cold intolerance  Genitourinary: no dysuria or hematuria    Objective     Vital Signs  Temp:  [97.5 °F (36.4 °C)-99.1 °F (37.3 °C)] 97.9 °F (36.6 °C)  Heart Rate:  [63-74] 65  Resp:  [14-18] 14  BP: (138-174)/() 153/95    Intake/Output Summary (Last 24 hours) at 5/25/2024 1107  Last data filed at 5/24/2024 2346  Gross per 24 hour   Intake 598 ml   Output --   Net 598 ml           Physical Exam:     General Appearance:    Alert,  in no acute distress   Head:    Normocephalic, without obvious abnormality, atraumatic   Throat:    no thrush, oral mucosa moist   Neck:   No adenopathy, supple, no JVD   Musc:     No CVA tenderness to palpation   Lungs:     Clear to auscultation,respirations unlabored, no wheezes or rhonchi    Heart:    Regular rate and rhythm , normal S1 and S2, no            murmur, no gallop, no rub   Abdomen:     Normal bowel sounds, no masses, soft non-tender   Extremities:    No lower extremity edema, no cyanosis   :     No hematuria   "  Skin:   Dry, No  bruising or rash                  Results Review:    Results from last 7 days   Lab Units 05/25/24  0532 05/24/24  1056   SODIUM mmol/L 138 134*   POTASSIUM mmol/L 4.5 4.8   CHLORIDE mmol/L 104 100   CO2 mmol/L 22.5 24.2   BUN mg/dL 41* 29*   CREATININE mg/dL 2.49* 2.32*   GLUCOSE mg/dL 99 94   CALCIUM mg/dL 8.7 9.7     Results from last 7 days   Lab Units 05/25/24  0532 05/24/24  1056 05/23/24  0000   WBC 10*3/mm3 6.17 7.84  --    HEMOGLOBIN g/dL 12.8* 15.1 13.1*  39.3*   HEMATOCRIT % 39.2 46.1  --    PLATELETS 10*3/mm3 207 264  --      No results found for: \"MG\"  No results found for: \"PHOS\"  No results found for: \"BNP\"  Lab Results   Component Value Date    ALBUMIN 4.5 05/24/2024      Brief Urine Lab Results       None             Cardiac Catheterization/Vascular Study    Result Date: 5/24/2024  The patient is to undergo dialysis tomorrow morning.  This was discussed with Dr. Vallejo per nephrology directly after the procedure.  If his fistula functions well he can be discharged thereafter.  However if there is any issues with the fistula we can be notified and a tunneled dialysis catheter can be placed for dialysis with subsequent re-evaluation of the fistula.     Duplex Hemodialysis Access CAR    Result Date: 5/24/2024    The arterial inflow vessel is patent without significant defect.   Left sided flow volumes are marginal.   Tortuous cephalic vein with diameter up to 1.8 cm.   Dampened outflow signal at the cephalosubclavian junction.   Overall the study is nondiagnostic and would consider fistulogram for clarification of potential significant stenosis.           Assessment & Plan       Inadequate flow of hemodialysis AV fistula    Dialysis AV fistula malfunction       ESRD, dialysis normally TTS schedule last full dialysis 5/16/2024.  Has been having trouble with his AV fistula in the last week or so.  Electrolytes reviewed, volume status adequate.  Seen on HD, no issues  - Left upper " extremity AV fistula malfunction, status post fistulogram and angioplasty of arch stenosis.  Appreciate vascular help.  - Hypertension, resume blood pressure medications.  - History of coronary disease.      Plan:    OK for D/C renal-wise  May need 24 hour urine creatinine clearance at HD unit with creatinine in mid 2's with no HD for several days      Please call if any questions  [phone]

## 2024-05-25 NOTE — DISCHARGE SUMMARY
[facility]         HOSPITALIST  DISCHARGE SUMMARY    Patient Name: Radha Hutchison  : 1970  MRN: [id number]    Date of Admission: 2024  Date of Discharge:  2024  Primary Care Physician: Provider, No Known    Consults       Date and Time Order Name Status Description    2024  3:25 PM Inpatient Nephrology Consult Completed     2024 11:08 AM Inpatient Hospitalist Consult      2024 10:58 AM Inpatient Vascular Surgery Consult              Active and Resolved Hospital Problems:  Active Hospital Problems    Diagnosis POA   • **Inadequate flow of hemodialysis AV fistula [T82.898A] Yes   • Dialysis AV fistula malfunction [T82.590A] Unknown      Resolved Hospital Problems   No resolved problems to display.       Hospital Course     Hospital Course:  Radha Hutchison is a 53 y.o. male with ESRD who presented with malfunctioning AV fistula, came to the ED and underwent fistulogram and angioplasty of arch stenosis. Tolerating dialysis today and is stable for dc home with the addition of Plavix to his regimen.    DISCHARGE Follow Up Recommendations for labs and diagnostics: f/u with nephro      Day of Discharge     Vital Signs:  Temp:  [97.5 °F (36.4 °C)-99.1 °F (37.3 °C)] 97.9 °F (36.6 °C)  Heart Rate:  [63-74] 69  Resp:  [14-18] 14  BP: (132-174)/() 132/92  Flow (L/min):  [2] 2  Physical Exam: nad, well appearing, getting HD      Discharge Details        Discharge Medications        New Medications        Instructions Start Date   clopidogrel 75 MG tablet  Commonly known as: PLAVIX   75 mg, Oral, Daily             Continue These Medications        Instructions Start Date   aspirin 81 MG EC tablet   81 mg, Oral, Daily      carvedilol 25 MG tablet  Commonly known as: COREG   25 mg, Oral, 2 Times Daily      cloNIDine 0.2 MG tablet  Commonly known as: CATAPRES   0.2 mg, Oral, 3 Times Daily      eplerenone 25 MG tablet  Commonly known as: INSPRA   25 mg, Oral, Daily       furosemide 40 MG tablet  Commonly known as: LASIX   40 mg, Oral, 2 Times Daily      hydrALAZINE 100 MG tablet  Commonly known as: APRESOLINE   100 mg, Oral, 3 Times Daily      NIFEdipine CC 90 MG 24 hr tablet  Commonly known as: ADALAT CC   90 mg, Oral, 2 Times Daily               Allergies   Allergen Reactions   • Coconut Anaphylaxis   • Morphine Anaphylaxis and Swelling       Discharge Disposition:  Home or Self Care    Diet:  Hospital:  Diet Order   Procedures   • Diet: Renal; Low Potassium, Low Sodium (2-3g), Low Phosphorus; Fluid Consistency: Thin (IDDSI 0)       Discharge Activity:       CODE STATUS:  Code Status and Medical Interventions:   Ordered at: 05/25/24 1153     Code Status (Patient has no pulse and is not breathing):    CPR (Attempt to Resuscitate)     Medical Interventions (Patient has pulse or is breathing):    Full Support         Future Appointments   Date Time Provider Department Center   5/28/2024  9:00 AM Mikey Perez MD [facility]           Pertinent  and/or Most Recent Results     PROCEDURES:   Fistulogram with angioplasty of arch stenosis    LAB RESULTS:      Lab 05/25/24  0532 05/24/24  1056 05/23/24  0000   WBC 6.17 7.84  --    HEMOGLOBIN 12.8* 15.1 13.1*  39.3*   HEMATOCRIT 39.2 46.1  --    PLATELETS 207 264  --    NEUTROS ABS 3.91 5.45  --    IMMATURE GRANS (ABS) 0.02 0.02  --    LYMPHS ABS 1.21 1.26  --    MONOS ABS 0.73 0.67  --    EOS ABS 0.24 0.36  --    MCV 82.7 84.3  --          Lab 05/25/24  0532 05/24/24  1056   SODIUM 138 134*   POTASSIUM 4.5 4.8   CHLORIDE 104 100   CO2 22.5 24.2   ANION GAP 11.5 9.8   BUN 41* 29*   CREATININE 2.49* 2.32*   EGFR 30.5* 33.2*   GLUCOSE 99 94   CALCIUM 8.7 9.7         Lab 05/24/24  1056   TOTAL PROTEIN 7.8   ALBUMIN 4.5   GLOBULIN 3.3   ALT (SGPT) 12   AST (SGOT) 13   BILIRUBIN 0.5   ALK PHOS 131*                     Brief Urine Lab Results       None          Microbiology Results (last 10 days)       ** No results found for the last  240 hours. **            No radiology results for the last 7 days     Results for orders placed during the hospital encounter of 05/24/24    Duplex Hemodialysis Access CAR    Interpretation Summary  •  The arterial inflow vessel is patent without significant defect.  •  Left sided flow volumes are marginal.  •  Tortuous cephalic vein with diameter up to 1.8 cm.  •  Dampened outflow signal at the cephalosubclavian junction.  •  Overall the study is nondiagnostic and would consider fistulogram for clarification of potential significant stenosis.      Results for orders placed during the hospital encounter of 05/24/24    Duplex Hemodialysis Access CAR    Interpretation Summary  •  The arterial inflow vessel is patent without significant defect.  •  Left sided flow volumes are marginal.  •  Tortuous cephalic vein with diameter up to 1.8 cm.  •  Dampened outflow signal at the cephalosubclavian junction.  •  Overall the study is nondiagnostic and would consider fistulogram for clarification of potential significant stenosis.      Results for orders placed during the hospital encounter of 09/14/22    Adult Transthoracic Echo Complete w/ Color, Spectral and Contrast if necessary per protocol    Interpretation Summary  · Left ventricular wall thickness is consistent with borderline concentric hypertrophy.  · Left ventricular ejection fraction appears to be 56 - 60%.  · Left ventricular diastolic function was normal.  · No significant valvular disease.  · Borderline left atrial enlargement.      Labs Pending at Discharge:  Pending Labs       Order Current Status    Hepatitis B Surface Antigen In process              Time spent on Discharge including face to face service:  <30 minutes    Electronically signed by Flaco Steel MD, 05/25/24, 11:54 AM EDT.

## 2024-05-26 NOTE — OUTREACH NOTE
Prep Survey      Flowsheet Row Responses   [facility] facility patient discharged from? [facility]   Is LACE score < 7 ? No   Eligibility Readm Mgmt   Discharge diagnosis Inadequate flow of hemodialysis AV fistula (   Does the patient have one of the following disease processes/diagnoses(primary or secondary)? Other   Does the patient have Home health ordered? No   Is there a DME ordered? No   Comments regarding appointments s TTS with [facility]   Prep survey completed? Yes            ROSY MENDOZA - Registered Nurse

## 2024-05-28 ENCOUNTER — TRANSCRIBE ORDERS (OUTPATIENT)
Dept: VASCULAR SURGERY | Facility: HOSPITAL | Age: 54
End: 2024-05-28
Payer: MEDICARE

## 2024-05-28 DIAGNOSIS — Z99.2 ESRD ON DIALYSIS: Primary | ICD-10-CM

## 2024-05-28 DIAGNOSIS — N18.6 ESRD ON DIALYSIS: Primary | ICD-10-CM

## 2024-05-29 ENCOUNTER — READMISSION MANAGEMENT (OUTPATIENT)
Dept: CALL CENTER | Facility: HOSPITAL | Age: 54
End: 2024-05-29
Payer: MEDICARE

## 2024-05-29 NOTE — OUTREACH NOTE
Medical Week 1 Survey      Flowsheet Row Responses   [facility] [place] patient discharged from? [facility]   Does the patient have one of the following disease processes/diagnoses(primary or secondary)? Other   Week 1 attempt successful? Yes   Call start time 1432   Call end time 1432   Discharge diagnosis Inadequate flow of hemodialysis AV fistula (   Meds reviewed with patient/caregiver? Yes   Is the patient having any side effects they believe may be caused by any medication additions or changes? No   Does the patient have all medications ordered at discharge? Yes   Is the patient taking all medications as directed (includes completed medication regime)? Yes   Does the patient have a primary care provider?  Yes   Does the patient have an appointment with their PCP within 7 days of discharge? Yes   Has the patient kept scheduled appointments due by today? N/A   Has home health visited the patient within 72 hours of discharge? N/A   Psychosocial issues? No   What is the patient's perception of their health status since discharge? Improving   Week 1 call completed? Yes   Graduated Yes   Call end time 1432            May SIMENTAL - Registered Nurse

## 2024-06-11 ENCOUNTER — TELEPHONE (OUTPATIENT)
Dept: VASCULAR SURGERY | Facility: HOSPITAL | Age: 54
End: 2024-06-11
Payer: MEDICARE

## 2024-06-12 ENCOUNTER — TRANSCRIBE ORDERS (OUTPATIENT)
Dept: VASCULAR SURGERY | Facility: HOSPITAL | Age: 54
End: 2024-06-12
Payer: MEDICARE

## 2024-06-12 DIAGNOSIS — N18.6 END STAGE RENAL FAILURE ON DIALYSIS: Primary | ICD-10-CM

## 2024-06-12 DIAGNOSIS — Z99.2 END STAGE RENAL FAILURE ON DIALYSIS: Primary | ICD-10-CM

## 2024-10-09 ENCOUNTER — TELEPHONE (OUTPATIENT)
Dept: VASCULAR SURGERY | Facility: HOSPITAL | Age: 54
End: 2024-10-09
Payer: MEDICARE

## 2024-10-09 NOTE — TELEPHONE ENCOUNTER
Called to reschedule his testing and office visit. Unable to leave a message. Transfer to the office to reschedule.

## 2024-10-21 ENCOUNTER — OFFICE VISIT (OUTPATIENT)
Dept: VASCULAR SURGERY | Facility: HOSPITAL | Age: 54
End: 2024-10-21
Payer: MEDICARE

## 2024-10-21 VITALS
TEMPERATURE: 98.2 F | SYSTOLIC BLOOD PRESSURE: 180 MMHG | OXYGEN SATURATION: 98 % | HEART RATE: 79 BPM | RESPIRATION RATE: 18 BRPM | DIASTOLIC BLOOD PRESSURE: 110 MMHG

## 2024-10-21 DIAGNOSIS — N18.6 ESRD ON DIALYSIS: Primary | ICD-10-CM

## 2024-10-21 DIAGNOSIS — Z99.2 ESRD ON DIALYSIS: Primary | ICD-10-CM

## 2024-10-21 PROCEDURE — G0463 HOSPITAL OUTPT CLINIC VISIT: HCPCS | Performed by: SURGERY

## 2024-10-21 NOTE — PROGRESS NOTES
[facility]   Follow up Office    Patient Name: Radha Hutchison  : 1970  MRN: [id number]  Primary Care Physician:  Provider, No Known      Subjective   Subjective     HPI:    Radha Hutchison is a 54 y.o. male in end-stage renal disease undergoing hemodialysis via a left upper arm fistula.  More recently there has been prolonged bleeding from the fistula after dialysis and even sometimes bleeding later after he takes the dressing off after 6 hours.  The left arm fistula was created in 2022 and he had fistulograms in April and May of this year.      Objective     Vitals:   Temp:  [98.2 °F (36.8 °C)] 98.2 °F (36.8 °C)  Heart Rate:  [79] 79  Resp:  [18] 18  BP: (180)/(110) 180/110    Physical Exam      General: Alert, no acute distress.  HEENT: PERRLA  Abdomen: Benign  Extremities: Symmetric.  Neuro: No gross deficits    Assessment & Plan   Assessment / Plan     Diagnoses and all orders for this visit:    1. ESRD on dialysis (Primary)  -     Case Request; Standing  -     ceFAZolin 2000 mg IVPB in 100 mL NS (VTB)  -     Case Request    Other orders  -     Follow Anesthesia Guidelines / Protocol; Future  -     Follow Anesthesia Guidelines / Protocol; Standing  -     Verify NPO Status; Standing  -     CBC & Differential; Standing  -     Basic Metabolic Panel; Standing  -     Insert Peripheral IV; Standing  -     Saline Lock & Maintain IV Access; Standing  -     VTE Prophylaxis Not Indicated:; Standing       Assessment/Plan:   Mr. Hutchison's left arm fistula is having prolonged bleeding suggestive of outflow stenosis.  Plan a left fistulogram with possible endovascular intervention.  I have discussed with the patient in detail the mechanics of the procedure, the indications, benefits, risks, alternatives as well as potential complications to include but not limited to infection, bleeding, inability to improve the fistula, vascular injury requiring surgical repair.  He appears to understand and desires to  proceed.        Electronically signed by Livan Choi MD, 10/21/24, 2:44 PM EDT.

## 2024-10-23 ENCOUNTER — LAB (OUTPATIENT)
Dept: LAB | Facility: HOSPITAL | Age: 54
End: 2024-10-23
Payer: MEDICARE

## 2024-10-23 ENCOUNTER — HOSPITAL ENCOUNTER (OUTPATIENT)
Dept: GENERAL RADIOLOGY | Facility: HOSPITAL | Age: 54
Discharge: HOME | End: 2024-10-23
Payer: MEDICARE

## 2024-10-23 ENCOUNTER — OFFICE VISIT (OUTPATIENT)
Dept: FAMILY MEDICINE CLINIC | Facility: CLINIC | Age: 54
End: 2024-10-23
Payer: MEDICARE

## 2024-10-23 VITALS
SYSTOLIC BLOOD PRESSURE: 146 MMHG | TEMPERATURE: 96.9 F | RESPIRATION RATE: 16 BRPM | BODY MASS INDEX: 24.71 KG/M2 | HEART RATE: 86 BPM | WEIGHT: 163 LBS | DIASTOLIC BLOOD PRESSURE: 72 MMHG | OXYGEN SATURATION: 98 % | HEIGHT: 68 IN

## 2024-10-23 DIAGNOSIS — Z76.89 ENCOUNTER TO ESTABLISH CARE: Primary | ICD-10-CM

## 2024-10-23 DIAGNOSIS — Z12.5 SCREENING FOR PROSTATE CANCER: ICD-10-CM

## 2024-10-23 DIAGNOSIS — Z23 NEED FOR VACCINATION: ICD-10-CM

## 2024-10-23 DIAGNOSIS — Z76.89 ENCOUNTER TO ESTABLISH CARE: ICD-10-CM

## 2024-10-23 DIAGNOSIS — M25.562 ACUTE PAIN OF LEFT KNEE: ICD-10-CM

## 2024-10-23 DIAGNOSIS — I10 PRIMARY HYPERTENSION: ICD-10-CM

## 2024-10-23 DIAGNOSIS — Z99.2 ESRD ON DIALYSIS: ICD-10-CM

## 2024-10-23 DIAGNOSIS — Z87.891 PERSONAL HISTORY OF TOBACCO USE, PRESENTING HAZARDS TO HEALTH: ICD-10-CM

## 2024-10-23 DIAGNOSIS — Z11.59 NEED FOR HEPATITIS C SCREENING TEST: ICD-10-CM

## 2024-10-23 DIAGNOSIS — N18.6 ESRD ON DIALYSIS: ICD-10-CM

## 2024-10-23 DIAGNOSIS — I51.7 CARDIAC HYPERTROPHY: ICD-10-CM

## 2024-10-23 DIAGNOSIS — Z12.11 SCREENING FOR COLON CANCER: ICD-10-CM

## 2024-10-23 LAB — HCV AB SER QL: REACTIVE

## 2024-10-23 PROCEDURE — 3078F DIAST BP <80 MM HG: CPT | Performed by: STUDENT IN AN ORGANIZED HEALTH CARE EDUCATION/TRAINING PROGRAM

## 2024-10-23 PROCEDURE — 90715 TDAP VACCINE 7 YRS/> IM: CPT | Performed by: STUDENT IN AN ORGANIZED HEALTH CARE EDUCATION/TRAINING PROGRAM

## 2024-10-23 PROCEDURE — G0103 PSA SCREENING: HCPCS

## 2024-10-23 PROCEDURE — 73562 X-RAY EXAM OF KNEE 3: CPT

## 2024-10-23 PROCEDURE — 1126F AMNT PAIN NOTED NONE PRSNT: CPT | Performed by: STUDENT IN AN ORGANIZED HEALTH CARE EDUCATION/TRAINING PROGRAM

## 2024-10-23 PROCEDURE — 80061 LIPID PANEL: CPT

## 2024-10-23 PROCEDURE — 1159F MED LIST DOCD IN RCRD: CPT | Performed by: STUDENT IN AN ORGANIZED HEALTH CARE EDUCATION/TRAINING PROGRAM

## 2024-10-23 PROCEDURE — 1160F RVW MEDS BY RX/DR IN RCRD: CPT | Performed by: STUDENT IN AN ORGANIZED HEALTH CARE EDUCATION/TRAINING PROGRAM

## 2024-10-23 PROCEDURE — 90471 IMMUNIZATION ADMIN: CPT | Performed by: STUDENT IN AN ORGANIZED HEALTH CARE EDUCATION/TRAINING PROGRAM

## 2024-10-23 PROCEDURE — 86803 HEPATITIS C AB TEST: CPT

## 2024-10-23 PROCEDURE — 36415 COLL VENOUS BLD VENIPUNCTURE: CPT

## 2024-10-23 PROCEDURE — 84443 ASSAY THYROID STIM HORMONE: CPT | Performed by: STUDENT IN AN ORGANIZED HEALTH CARE EDUCATION/TRAINING PROGRAM

## 2024-10-23 PROCEDURE — 99204 OFFICE O/P NEW MOD 45 MIN: CPT | Performed by: STUDENT IN AN ORGANIZED HEALTH CARE EDUCATION/TRAINING PROGRAM

## 2024-10-23 PROCEDURE — 3077F SYST BP >= 140 MM HG: CPT | Performed by: STUDENT IN AN ORGANIZED HEALTH CARE EDUCATION/TRAINING PROGRAM

## 2024-10-23 NOTE — PROGRESS NOTES
"Chief Complaint  Hypertension    Subjective         Radha Hutchison is a 54 y.o. male who presents to [facility]  54 years old comes to the clinic today to establish care for management of comorbidities and medications.    Patient has history of uncontrolled resistant hypertension, currently has end-stage renal disease and on dialysis.  Patient is following up with kidney doctor and vascular for fistula.    Patient has seen cardiologist in the past and was diagnosed with left ventricular hypertrophy.    Hypertension; compliant with current medications, does not report blood pressure significantly elevated recently.  Does not monitor blood pressure at home regularly.    Compliant with nephrologist    Complaining of on and off left knee pain which has been going on for more than 9 months.  Not a candidate for NSAIDs or any steroids.    Patient is current smoker    Denies any other acute complaint, 12+ review of systems are unremarkable otherwise.      Objective   Vital Signs:   Vitals:    10/23/24 1320   BP: 146/72   BP Location: Right arm   Patient Position: Sitting   Cuff Size: Adult   Pulse: 86   Resp: 16   Temp: 96.9 °F (36.1 °C)   TempSrc: Temporal   SpO2: 98%   Weight: 73.9 kg (163 lb)   Height: 172.7 cm (68\")      Body mass index is 24.78 kg/m².   Wt Readings from Last 3 Encounters:   10/23/24 73.9 kg (163 lb)   05/25/24 78 kg (171 lb 15.3 oz)   04/26/24 77 kg (169 lb 12.1 oz)      BP Readings from Last 3 Encounters:   10/23/24 146/72   10/21/24 (!) 180/110   05/25/24 121/85        Patient Care Team:  Sonia Pierce MD as PCP - General (Family Medicine)  Jonathon Vallejo MD as Consulting Physician (Nephrology)     Physical Exam  Vitals reviewed.   Constitutional:       Appearance: Normal appearance. He is well-developed.   HENT:      Head: Normocephalic and atraumatic.      Right Ear: External ear normal.      Left Ear: External ear normal.      Mouth/Throat:      Pharynx: " No oropharyngeal exudate.   Eyes:      Conjunctiva/sclera: Conjunctivae normal.      Pupils: Pupils are equal, round, and reactive to light.   Cardiovascular:      Rate and Rhythm: Normal rate and regular rhythm.      Heart sounds: No murmur heard.     No friction rub. No gallop.   Pulmonary:      Effort: Pulmonary effort is normal.      Breath sounds: Normal breath sounds. No wheezing or rhonchi.   Abdominal:      General: Bowel sounds are normal. There is no distension.      Palpations: Abdomen is soft.      Tenderness: There is no abdominal tenderness.   Skin:     General: Skin is warm and dry.   Neurological:      Mental Status: He is alert and oriented to person, place, and time.      Cranial Nerves: No cranial nerve deficit.   Psychiatric:         Mood and Affect: Mood and affect normal.         Behavior: Behavior normal.         Thought Content: Thought content normal.         Judgment: Judgment normal.            BMI is within normal parameters. No other follow-up for BMI required.              Assessment and Plan   Diagnoses and all orders for this visit:    1. Encounter to establish care (Primary)  -     TSH Rfx On Abnormal To Free T4  -     Lipid panel; Future  -     PSA SCREENING; Future    2. ESRD on dialysis  Comments:  Continue with dialysis/nephrologist  Orders:  -     TSH Rfx On Abnormal To Free T4  -     Lipid panel; Future  -     PSA SCREENING; Future    3. Primary hypertension  Comments:  Chronic, continue with hydralazine/nifedipine/clonidine/Coreg.  2 weeks blood pressures logs requested  Orders:  -     TSH Rfx On Abnormal To Free T4  -     Lipid panel; Future  -     PSA SCREENING; Future    4. Personal history of tobacco use, presenting hazards to health  -     TSH Rfx On Abnormal To Free T4  -     Lipid panel; Future  -      CT Chest Low Dose Cancer Screening WO; Future  -     PSA SCREENING; Future    5. Screening for prostate cancer  -     TSH Rfx On Abnormal To Free T4  -     Lipid panel;  Future  -     PSA SCREENING; Future    6. Screening for colon cancer  -     Ambulatory Referral For Screening Colonoscopy    7. Need for hepatitis C screening test  -     Hepatitis C Antibody; Future    8. Cardiac hypertrophy  Comments:  Last cardiologist evaluation reviewed, echo and stress test reviewed from 2022    9. Acute pain of left knee  Comments:  RICE therapy/conservative management discussed.  Will wait for x-ray and consider orthopedic.  Orders:  -     XR Knee 3 View Left; Future    10. Need for vaccination  Comments:  Tdap vaccine given  Orders:  -     Tdap Vaccine => 6yo IM (BOOSTRIX/ADACEL)          Tobacco Use: High Risk (10/23/2024)    Patient History     Smoking Tobacco Use: Every Day     Smokeless Tobacco Use: Never     Passive Exposure: Current            Follow Up   Return in about 6 months (around 4/23/2025) for Medicare Wellness.  Patient was given instructions and counseling regarding his condition or for health maintenance advice. Please see specific information pulled into the AVS if appropriate.

## 2024-10-24 DIAGNOSIS — Z86.19 HISTORY OF HEPATITIS C: Primary | ICD-10-CM

## 2024-10-24 LAB
CHOLEST SERPL-MCNC: 182 MG/DL (ref 0–200)
HDLC SERPL-MCNC: 37 MG/DL (ref 40–60)
LDLC SERPL CALC-MCNC: 129 MG/DL (ref 0–100)
LDLC/HDLC SERPL: 3.45 {RATIO}
PSA SERPL-MCNC: 0.89 NG/ML (ref 0–4)
TRIGL SERPL-MCNC: 87 MG/DL (ref 0–150)
TSH SERPL DL<=0.05 MIU/L-ACNC: 0.54 UIU/ML (ref 0.27–4.2)
VLDLC SERPL-MCNC: 16 MG/DL (ref 5–40)

## 2024-10-28 ENCOUNTER — TELEPHONE (OUTPATIENT)
Dept: GASTROENTEROLOGY | Facility: CLINIC | Age: 54
End: 2024-10-28
Payer: MEDICARE

## 2024-10-28 NOTE — TELEPHONE ENCOUNTER
Spoke with patient regarding a referral we received from Sonia Pierce for Hep C. Patient scheduled an appointment on 11/15/2024@8:00.

## 2024-10-30 PROCEDURE — S0260 H&P FOR SURGERY: HCPCS | Performed by: SURGERY

## 2024-10-30 NOTE — H&P
[facility]   HISTORY AND PHYSICAL    Patient Name: Radha Hutchison  : 1970  MRN: [id number]  Primary Care Physician:  Sonia Pierce MD  Date of admission: (Not on file)    Subjective   Subjective     Chief Complaint: Malfunctioning left arm fistula    HPI:    Radha Hutchison is a 54 y.o. male with prolonged bleeding after dialysis.    Review of Systems    Non contributory except for the History of Present Illness    Personal History     Past Medical History:   Diagnosis Date    CHF (congestive heart failure) May 2021    no recent issues    Coronary artery disease May 2021    follows w/[facility], no c/o cp or soa, workup 2022, instructed to come back in a year    End stage renal failure on dialysis     currently using right side  TDC, dialysis T, T, S    History of COVID-19 2020    Hypertension 1990    Myocardial infarction        Past Surgical History:   Procedure Laterality Date    APPENDECTOMY      ARTERIOVENOUS FISTULA/SHUNT SURGERY Left 2022    Procedure: CREATION OF ARTERIOVENOUS FISTULA LEFT ARM;  Surgeon: Livan Choi MD;  Location: [facility] OR;  Service: Vascular;  Laterality: Left;    INSERTION HEMODIALYSIS CATHETER Right 2022    Procedure: HEMODIALYSIS CATHETER INSERTION;  Surgeon: Mikey Perez MD;  Location: [facility] OR;  Service: Vascular;  Laterality: Right;    INSERTION HEMODIALYSIS CATHETER N/A 10/24/2022    Procedure: HEMODIALYSIS CATHETER INSERTION exchange over guidewire.;  Surgeon: Mikey Perez MD;  Location: [facility] OR;  Service: Vascular;  Laterality: N/A;    INSERTION PERITONEAL DIALYSIS CATHETER      INSERTION PERITONEAL DIALYSIS CATHETER N/A 2022    Procedure: DIAGNOSTIC LAPAROSCOPY,  peritoneal dialysis pexy;  Surgeon: Danie Morocho MD;  Location: [facility];  Service: General;  Laterality: N/A;    REMOVAL PERITONEAL DIALYSIS CATHETER N/A 10/20/2022    Procedure: REMOVAL PERITONEAL DIALYSIS CATHETER;  Surgeon:  Matthew Arshad MD;  [facility];  Service: General;  Laterality: N/A;    SHUNT O GRAM Left 4/26/2024    Procedure: Left arm fistulogram, possible angioplasty or stenting;  Surgeon: Livan Choi MD;  Location: [facility] CATH INVASIVE LOCATION;  Service: Peripheral Vascular;  Laterality: Left;    SHUNT O GRAM N/A 5/24/2024    Procedure: Left upper extremity fistulagram with possible intervention, possible tunneled dialysis catheter placement;  Surgeon: Mikey Perez MD;  Location: [facility] CATH INVASIVE LOCATION;  Service: Interventional Radiology;  Laterality: N/A;       Family History: family history includes Cancer in his mother; Heart disease in his maternal grandfather; Hypertension in his mother. Otherwise pertinent FHx was reviewed and not pertinent to current issue.    Social History:  reports that he has been smoking cigarettes. He started smoking about 37 years ago. He has a 35.9 pack-year smoking history. He has been exposed to tobacco smoke. He has never used smokeless tobacco. He reports that he does not drink alcohol and does not use drugs.    Home Medications:  No current facility-administered medications on file prior to encounter.     Current Outpatient Medications on File Prior to Encounter   Medication Sig    aspirin 81 MG EC tablet Take 1 tablet by mouth Daily.    carvedilol (COREG) 25 MG tablet Take 1 tablet by mouth 2 (Two) Times a Day.    cloNIDine (CATAPRES) 0.2 MG tablet Take 1 tablet by mouth 3 (Three) Times a Day.    clopidogrel (PLAVIX) 75 MG tablet Take 1 tablet by mouth Daily.    eplerenone (INSPRA) 25 MG tablet Take 1 tablet by mouth Daily.    furosemide (LASIX) 40 MG tablet Take 1 tablet by mouth 2 (Two) Times a Day.    hydrALAZINE (APRESOLINE) 100 MG tablet Take 1 tablet by mouth 3 (Three) Times a Day.    NIFEdipine CC (ADALAT CC) 90 MG 24 hr tablet Take 1 tablet by mouth 2 (Two) Times a Day.          Allergies:  Allergies   Allergen Reactions    Coconut Anaphylaxis     Morphine Anaphylaxis and Swelling       Objective   Objective     Vitals:        Physical Exam    General: Awake, alert, NAD   Eyes:  TK   Neck: Supple   Lungs: Clear   Heart: RRR   Abdomen: benign   Musculoskeletal:  normal motor tone, symmetric   Skin: warm, normal turgor   Neuro: strength 5/5 all extremities       Assessment & Plan   Assessment / Plan     Active Hospital Problems:  Active Hospital Problems    Diagnosis     **ESRD on dialysis        Diagnoses and all orders for this visit:    1. ESRD on dialysis  -     Cardiac Catheterization/Vascular Study; Standing  -     Cardiac Catheterization/Vascular Study        Assessment/plan:   Mr. Hutchison's left arm fistula is having prolonged bleeding suggestive of outflow stenosis.  Plan a left fistulogram with possible endovascular intervention.  I have discussed with the patient in detail the mechanics of the procedure, the indications, benefits, risks, alternatives as well as potential complications to include but not limited to infection, bleeding, inability to improve the fistula, vascular injury requiring surgical repair.  He appears to understand and desires to proceed.       Electronically signed by Livan Choi MD, 10/30/24, 1:24 PM EDT.

## 2024-10-31 ENCOUNTER — HOSPITAL ENCOUNTER (OUTPATIENT)
Facility: HOSPITAL | Age: 54
Setting detail: HOSPITAL OUTPATIENT SURGERY
Discharge: HOME | End: 2024-10-31
Attending: SURGERY | Admitting: SURGERY
Payer: MEDICARE

## 2024-10-31 ENCOUNTER — TELEPHONE (OUTPATIENT)
Dept: GASTROENTEROLOGY | Facility: CLINIC | Age: 54
End: 2024-10-31
Payer: MEDICARE

## 2024-10-31 VITALS
WEIGHT: 167.77 LBS | SYSTOLIC BLOOD PRESSURE: 148 MMHG | DIASTOLIC BLOOD PRESSURE: 98 MMHG | RESPIRATION RATE: 14 BRPM | TEMPERATURE: 98.3 F | OXYGEN SATURATION: 97 % | BODY MASS INDEX: 24.02 KG/M2 | HEIGHT: 70 IN | HEART RATE: 74 BPM

## 2024-10-31 DIAGNOSIS — Z99.2 ESRD ON DIALYSIS: ICD-10-CM

## 2024-10-31 DIAGNOSIS — N18.6 ESRD ON DIALYSIS: ICD-10-CM

## 2024-10-31 LAB
ANION GAP SERPL CALCULATED.3IONS-SCNC: 8.6 MMOL/L (ref 5–15)
BASOPHILS # BLD AUTO: 0.07 10*3/MM3 (ref 0–0.2)
BASOPHILS NFR BLD AUTO: 1 % (ref 0–1.5)
BUN SERPL-MCNC: 37 MG/DL (ref 6–20)
BUN/CREAT SERPL: 13.8 (ref 7–25)
CALCIUM SPEC-SCNC: 9 MG/DL (ref 8.6–10.5)
CHLORIDE SERPL-SCNC: 105 MMOL/L (ref 98–107)
CO2 SERPL-SCNC: 22.4 MMOL/L (ref 22–29)
CREAT SERPL-MCNC: 2.69 MG/DL (ref 0.76–1.27)
DEPRECATED RDW RBC AUTO: 49.6 FL (ref 37–54)
EGFRCR SERPLBLD CKD-EPI 2021: 27.6 ML/MIN/1.73
EOSINOPHIL # BLD AUTO: 0.4 10*3/MM3 (ref 0–0.4)
EOSINOPHIL NFR BLD AUTO: 6 % (ref 0.3–6.2)
ERYTHROCYTE [DISTWIDTH] IN BLOOD BY AUTOMATED COUNT: 16.9 % (ref 12.3–15.4)
GLUCOSE SERPL-MCNC: 99 MG/DL (ref 65–99)
HCT VFR BLD AUTO: 40.7 % (ref 37.5–51)
HGB BLD-MCNC: 13.4 G/DL (ref 13–17.7)
IMM GRANULOCYTES # BLD AUTO: 0.01 10*3/MM3 (ref 0–0.05)
IMM GRANULOCYTES NFR BLD AUTO: 0.1 % (ref 0–0.5)
LYMPHOCYTES # BLD AUTO: 1.38 10*3/MM3 (ref 0.7–3.1)
LYMPHOCYTES NFR BLD AUTO: 20.6 % (ref 19.6–45.3)
MCH RBC QN AUTO: 26.6 PG (ref 26.6–33)
MCHC RBC AUTO-ENTMCNC: 32.9 G/DL (ref 31.5–35.7)
MCV RBC AUTO: 80.9 FL (ref 79–97)
MONOCYTES # BLD AUTO: 0.69 10*3/MM3 (ref 0.1–0.9)
MONOCYTES NFR BLD AUTO: 10.3 % (ref 5–12)
NEUTROPHILS NFR BLD AUTO: 4.16 10*3/MM3 (ref 1.7–7)
NEUTROPHILS NFR BLD AUTO: 62 % (ref 42.7–76)
NRBC BLD AUTO-RTO: 0 /100 WBC (ref 0–0.2)
PLATELET # BLD AUTO: 226 10*3/MM3 (ref 140–450)
PMV BLD AUTO: 10.2 FL (ref 6–12)
POTASSIUM SERPL-SCNC: 4 MMOL/L (ref 3.5–5.2)
RBC # BLD AUTO: 5.03 10*6/MM3 (ref 4.14–5.8)
SODIUM SERPL-SCNC: 136 MMOL/L (ref 136–145)
WBC NRBC COR # BLD AUTO: 6.71 10*3/MM3 (ref 3.4–10.8)

## 2024-10-31 PROCEDURE — 25010000002 MIDAZOLAM PER 1MG: Performed by: SURGERY

## 2024-10-31 PROCEDURE — 85025 COMPLETE CBC W/AUTO DIFF WBC: CPT | Performed by: SURGERY

## 2024-10-31 PROCEDURE — 25010000002 HYDRALAZINE PER 20 MG: Performed by: SURGERY

## 2024-10-31 PROCEDURE — C1725 CATH, TRANSLUMIN NON-LASER: HCPCS | Performed by: SURGERY

## 2024-10-31 PROCEDURE — C1769 GUIDE WIRE: HCPCS | Performed by: SURGERY

## 2024-10-31 PROCEDURE — 25010000002 HEPARIN (PORCINE) PER 1000 UNITS: Performed by: SURGERY

## 2024-10-31 PROCEDURE — 99153 MOD SED SAME PHYS/QHP EA: CPT | Performed by: SURGERY

## 2024-10-31 PROCEDURE — C1894 INTRO/SHEATH, NON-LASER: HCPCS | Performed by: SURGERY

## 2024-10-31 PROCEDURE — 25010000002 CEFAZOLIN PER 500 MG: Performed by: SURGERY

## 2024-10-31 PROCEDURE — 25010000002 FENTANYL CITRATE (PF) 100 MCG/2ML SOLUTION: Performed by: SURGERY

## 2024-10-31 PROCEDURE — 80048 BASIC METABOLIC PNL TOTAL CA: CPT | Performed by: SURGERY

## 2024-10-31 PROCEDURE — C2623 CATH, TRANSLUMIN, DRUG-COAT: HCPCS | Performed by: SURGERY

## 2024-10-31 PROCEDURE — 25510000001 IOPAMIDOL PER 1 ML: Performed by: SURGERY

## 2024-10-31 PROCEDURE — 99152 MOD SED SAME PHYS/QHP 5/>YRS: CPT | Performed by: SURGERY

## 2024-10-31 PROCEDURE — 25010000002 LIDOCAINE 2% SOLUTION: Performed by: SURGERY

## 2024-10-31 RX ORDER — FENTANYL CITRATE 50 UG/ML
INJECTION, SOLUTION INTRAMUSCULAR; INTRAVENOUS
Status: DISCONTINUED | OUTPATIENT
Start: 2024-10-31 | End: 2024-10-31 | Stop reason: HOSPADM

## 2024-10-31 RX ORDER — MIDAZOLAM HYDROCHLORIDE 2 MG/2ML
INJECTION, SOLUTION INTRAMUSCULAR; INTRAVENOUS
Status: DISCONTINUED | OUTPATIENT
Start: 2024-10-31 | End: 2024-10-31 | Stop reason: HOSPADM

## 2024-10-31 RX ORDER — IOPAMIDOL 755 MG/ML
INJECTION, SOLUTION INTRAVASCULAR
Status: DISCONTINUED | OUTPATIENT
Start: 2024-10-31 | End: 2024-10-31 | Stop reason: HOSPADM

## 2024-10-31 RX ORDER — LIDOCAINE HYDROCHLORIDE 20 MG/ML
INJECTION, SOLUTION INFILTRATION; PERINEURAL
Status: DISCONTINUED | OUTPATIENT
Start: 2024-10-31 | End: 2024-10-31 | Stop reason: HOSPADM

## 2024-10-31 RX ORDER — HEPARIN SODIUM 1000 [USP'U]/ML
INJECTION, SOLUTION INTRAVENOUS; SUBCUTANEOUS
Status: DISCONTINUED | OUTPATIENT
Start: 2024-10-31 | End: 2024-10-31 | Stop reason: HOSPADM

## 2024-10-31 RX ORDER — HYDRALAZINE HYDROCHLORIDE 20 MG/ML
INJECTION INTRAMUSCULAR; INTRAVENOUS
Status: DISCONTINUED | OUTPATIENT
Start: 2024-10-31 | End: 2024-10-31 | Stop reason: HOSPADM

## 2024-10-31 NOTE — DISCHARGE INSTRUCTIONS
May use left arm fistula for hemodialysis  May remove dressing and wash area tomorrow.  Dialysis unit to remove stitches in 3 days.  If unable to have stitches removed by the dialysis unit, call the office to arrange.  Resume home diet.  Resume home medications.  Follow up as needed.

## 2024-10-31 NOTE — TELEPHONE ENCOUNTER
10/31/2024        Radha Hutchison  1970  [address] [place] [address]        Dear Radha Hutchison:      Our office recently received a referral for a gastroenterology consultation. We have attempted to reach you three times by phone but have been unsuccessful. At your earliest convenience, please contact the office at [phone] to discuss arranging this appointment.     Sincerely,  [facility]

## 2024-10-31 NOTE — Clinical Note
Prepped: left brachial, left chest and Left Wrist. Prepped with: ChloraPrep. The site was clipped. The patient was draped in a sterile fashion.

## 2024-10-31 NOTE — Clinical Note
Purse string suturing was used in achieving hemostasis. Closure device deployed in the vessel. Hemostasis achieved successfully.

## 2024-10-31 NOTE — PROGRESS NOTES
Fistulogram performed.  Moderate stenosis at the cephalic arch, angioplastied to 9 mm with mild residual stenosis.  For details find full report under chart review, cardiology tab.

## 2024-10-31 NOTE — TELEPHONE ENCOUNTER
Left voice message for patient to return call if he would like a sooner appointment at the complex care clinic with Katie Mayorga. She has an opening on 11/1/2024@8:00 or 10:00

## 2024-11-12 ENCOUNTER — TELEPHONE (OUTPATIENT)
Dept: GASTROENTEROLOGY | Facility: CLINIC | Age: 54
End: 2024-11-12
Payer: MEDICARE

## 2024-11-15 ENCOUNTER — OFFICE VISIT (OUTPATIENT)
Dept: GASTROENTEROLOGY | Facility: HOSPITAL | Age: 54
End: 2024-11-15
Payer: MEDICARE

## 2024-11-15 VITALS
HEIGHT: 70 IN | HEART RATE: 84 BPM | DIASTOLIC BLOOD PRESSURE: 92 MMHG | WEIGHT: 166.2 LBS | BODY MASS INDEX: 23.79 KG/M2 | SYSTOLIC BLOOD PRESSURE: 133 MMHG

## 2024-11-15 DIAGNOSIS — Z86.19 HISTORY OF HEPATITIS C: Primary | ICD-10-CM

## 2024-11-15 PROCEDURE — G0463 HOSPITAL OUTPT CLINIC VISIT: HCPCS | Performed by: NURSE PRACTITIONER

## 2024-11-15 PROCEDURE — 91200 LIVER ELASTOGRAPHY: CPT | Performed by: NURSE PRACTITIONER

## 2024-11-15 NOTE — PROGRESS NOTES
Chief Complaint      Chief Complaint  Hepatitis C    Subjective            History of Present Illness     Radha Hutchison presents to [facility] for evaluation and treatment of chronic HCV.      He reports previous diagnosis and treatment of HCV while living in [place].  Denies any IV drug use or contaminated needle exposure since previous treatment.  States that his viral load was previously not detected.      PMH  - ESRD maintained on hemodialysis.        Past Medical History     Allergies   Allergen Reactions    Coconut Anaphylaxis    Morphine Anaphylaxis and Swelling       Current Outpatient Medications:     aspirin 81 MG EC tablet, Take 1 tablet by mouth Daily., Disp: , Rfl:     carvedilol (COREG) 25 MG tablet, Take 1 tablet by mouth 2 (Two) Times a Day., Disp: , Rfl:     cloNIDine (CATAPRES) 0.2 MG tablet, Take 1 tablet by mouth 3 (Three) Times a Day., Disp: , Rfl:     clopidogrel (PLAVIX) 75 MG tablet, Take 1 tablet by mouth Daily., Disp: 30 tablet, Rfl: 11    eplerenone (INSPRA) 25 MG tablet, Take 1 tablet by mouth Daily., Disp: , Rfl:     furosemide (LASIX) 40 MG tablet, Take 1 tablet by mouth 2 (Two) Times a Day., Disp: , Rfl:     hydrALAZINE (APRESOLINE) 100 MG tablet, Take 1 tablet by mouth 3 (Three) Times a Day., Disp: , Rfl:     NIFEdipine CC (ADALAT CC) 90 MG 24 hr tablet, Take 1 tablet by mouth 2 (Two) Times a Day., Disp: , Rfl:   Past Medical History:   Diagnosis Date    CHF (congestive heart failure) May 2021    no recent issues    Coronary artery disease May 2021    follows w/[facility], no c/o cp or soa, workup 09/2022, instructed to come back in a year    End stage renal failure on dialysis     currently using right side  TDC, dialysis T, T, S    History of COVID-19 09/2020    Hypertension Sept. 1990    Myocardial infarction 2021     Past Surgical History:   Procedure Laterality Date    APPENDECTOMY  1994    ARTERIOVENOUS FISTULA/SHUNT SURGERY Left 11/8/2022     Procedure: CREATION OF ARTERIOVENOUS FISTULA LEFT ARM;  Surgeon: Livan Choi MD;  Location: [facility] MAIN OR;  Service: Vascular;  Laterality: Left;    INSERTION HEMODIALYSIS CATHETER Right 9/2/2022    Procedure: HEMODIALYSIS CATHETER INSERTION;  Surgeon: Mikey Perez MD;  Location: [facility] MAIN OR;  Service: Vascular;  Laterality: Right;    INSERTION HEMODIALYSIS CATHETER N/A 10/24/2022    Procedure: HEMODIALYSIS CATHETER INSERTION exchange over guidewire.;  Surgeon: Mikey Perez MD;  Location: [facility] MAIN OR;  Service: Vascular;  Laterality: N/A;    INSERTION PERITONEAL DIALYSIS CATHETER      INSERTION PERITONEAL DIALYSIS CATHETER N/A 7/11/2022    Procedure: DIAGNOSTIC LAPAROSCOPY,  peritoneal dialysis pexy;  Surgeon: Danie Morocho MD;  Location: [facility] MAIN OR;  Service: General;  Laterality: N/A;    REMOVAL PERITONEAL DIALYSIS CATHETER N/A 10/20/2022    Procedure: REMOVAL PERITONEAL DIALYSIS CATHETER;  Surgeon: Matthew Arshad MD;  Location: [facility] OR OSC;  Service: General;  Laterality: N/A;    SHUNT O GRAM Left 4/26/2024    Procedure: Left arm fistulogram, possible angioplasty or stenting;  Surgeon: Livan Choi MD;  Location: [facility] CATH INVASIVE LOCATION;  Service: Peripheral Vascular;  Laterality: Left;    SHUNT O GRAM N/A 5/24/2024    Procedure: Left upper extremity fistulagram with possible intervention, possible tunneled dialysis catheter placement;  Surgeon: Mikey Perez MD;  Location: [facility] CATH INVASIVE LOCATION;  Service: Interventional Radiology;  Laterality: N/A;    SHUNT O GRAM Left 10/31/2024    Procedure: Left arm fistulogram, possible angioplasty or stenting /  Image the left arm dialysis access and open up any blockages;  Surgeon: Livan Choi MD;  Location: [facility] CATH INVASIVE LOCATION;  Service: Peripheral Vascular;  Laterality: Left;     Social History     Socioeconomic History    Marital status: Single   Tobacco Use    Smoking status: Every Day      Current packs/day: 0.50     Average packs/day: 1 pack/day for 36.8 years (35.9 ttl pk-yrs)     Types: Cigarettes     Start date: 3/15/1987     Last attempt to quit: 2/20/2022     Passive exposure: Current    Smokeless tobacco: Never    Tobacco comments:     Last smoked this am 0600 4/26   Vaping Use    Vaping status: Some Days    Substances: Flavoring    Devices: Pre-filled or refillable cartridge   Substance and Sexual Activity    Alcohol use: Never    Drug use: Never    Sexual activity: Yes     Partners: Female       Objective     Objective     Vitals:    11/15/24 0757   BP: 133/92   Pulse: 84     Body mass index is 23.85 kg/m².  Body surface area is 1.93 meters squared.    Physical Exam    Results       Result Review :     The following data was reviewed by: BRIANNE Walter on 11/15/2024:    Pennie Stiffness Consistent with: F0/F1  CAP Score: S0    CMP:  Lab Results   Component Value Date    BUN 37 (H) 10/31/2024    CREATININE 2.69 (H) 10/31/2024     10/31/2024    K 4.0 10/31/2024     10/31/2024    CALCIUM 9.9 11/05/2024    ALBUMIN 4.5 05/24/2024    BILITOT 0.5 05/24/2024    ALKPHOS 131 (H) 05/24/2024    AST 13 05/24/2024    ALT 12 05/24/2024     CBC w/ diff:   Lab Results   Component Value Date    WBC 6.71 10/31/2024    RBC 5.03 10/31/2024    HGB 14.2 11/05/2024    HGB 42.6 11/05/2024    HCT 40.7 10/31/2024    MCV 80.9 10/31/2024    MCH 26.6 10/31/2024    MCHC 32.9 10/31/2024    RDW 16.9 (H) 10/31/2024     10/31/2024    NEUTRORELPCT 62.0 10/31/2024    AUTOIGPER 0.1 10/31/2024    LYMPHORELPCT 20.6 10/31/2024    MONORELPCT 10.3 10/31/2024    EOSRELPCT 6.0 10/31/2024    BASORELPCT 1.0 10/31/2024     Acute Hepatitis Panel   Lab Results   Component Value Date    HEPBSAG Non-Reactive 05/25/2024    HEPCVIRUSABY Reactive (A) 10/23/2024          Liver Elastography    Performed by: Katie Mayorga APRN  Authorized by: Katie Mayorga APRN  Ordering Provider: Katie Mayorga  APRN    Probe:  M+  Procedure Details:  Procedure: After providing an oral and written explanation of the Fibroscan vibration controlled transient elastography (VTCE) test procedure to the patient. The patient was placed in supine position with right arm in maximum abduction to allow optimal exposure of right lateral abdomen. Patient was briefly assessed, identifying terminus of the xyphoid process and locating an ideal transient elastography testing site, midline and lateral to this point. Patient was instructed to breathe normally and remain stationary during the test process. Pre-measurement data confirmed the transient elastography probe was centered over the liver parenchyma. A series of ten 50 Hz mechanical pulses were applied with controlled application pressure to induce a mechanical shear wave in the liver tissue. For each measurement, the shear wave propagation speed was detected, displayed and converted to its equivalent liver stiffness value in kilopascals. Skin to liver capsules distance and shear wave characteristics were monitored during the entire examination to assure quality data. Median liver stiffness measurement and interquartile range were calculated and displayed in real time. Acquired measurement data was stored and submitted to the provider for review and interpretation. Patient tolerated the procedure well and was discharged without incident.   Clinical Information:     NPO 3 Hours or More: Yes      Actively Drinking: No    Findings:     Median Liver Stiffness Score:  4.2    Interquartile Range (IQR) to Median Ratio:  22    Interpretation:  Taking into account the patient's history and recent liver test, this Liver Stiffness Score is consistent with below scale:    Pennie Stiffness Consistent with:  F0-F1    Current Scan Considered Reliab: Yes      Median Controlled Attenuation Parameter (dB/m):  206    IQR:  17    Liver Fat Interpretation:  Taking into account the patient's history, this CAP  score is consistent with below scale:      CAP SCORE:  Normal/mild liver fat       Assessment and Plan            Assessment:    Diagnoses and all orders for this visit:    1. History of hepatitis C (Primary)  -     Hepatitis C RNA, Quantitative, PCR (graph); Future    Other orders  -     Liver Elastography         Plan:   Labs today to determine if HCV is active.  Previously cured with no recent exposure.  If HCV detected, will schedule f/u and proceed with treatment.      Patient Instructions: Avoid Alcohol, Avoid Illicit Drug Use, Importance of keeping appointments.  Patient Education Provided: Yes    Follow Up     Follow Up   Return if symptoms worsen or fail to improve.  Patient was given instructions and counseling regarding his condition or for health maintenance advice. Please see specific information pulled into the AVS if appropriate.     Katie Mayorga, APRN  11/15/2024

## 2024-12-02 ENCOUNTER — HOSPITAL ENCOUNTER (OUTPATIENT)
Dept: CT IMAGING | Facility: HOSPITAL | Age: 54
Discharge: HOME | End: 2024-12-02
Payer: MEDICARE

## 2024-12-02 ENCOUNTER — HOSPITAL ENCOUNTER (OUTPATIENT)
Dept: ULTRASOUND IMAGING | Facility: HOSPITAL | Age: 54
Discharge: HOME | End: 2024-12-02
Payer: MEDICARE

## 2024-12-02 DIAGNOSIS — Z86.19 HISTORY OF HEPATITIS C: ICD-10-CM

## 2024-12-02 DIAGNOSIS — Z87.891 PERSONAL HISTORY OF TOBACCO USE, PRESENTING HAZARDS TO HEALTH: ICD-10-CM

## 2024-12-02 PROCEDURE — 71271 CT THORAX LUNG CANCER SCR C-: CPT

## 2024-12-02 PROCEDURE — 76705 ECHO EXAM OF ABDOMEN: CPT

## 2024-12-05 ENCOUNTER — TELEPHONE (OUTPATIENT)
Dept: GASTROENTEROLOGY | Facility: CLINIC | Age: 54
End: 2024-12-05
Payer: MEDICARE

## 2025-02-23 ENCOUNTER — HOSPITAL ENCOUNTER (EMERGENCY)
Facility: HOSPITAL | Age: 55
Discharge: HOME | End: 2025-02-23
Attending: EMERGENCY MEDICINE | Admitting: EMERGENCY MEDICINE
Payer: MEDICARE

## 2025-02-23 VITALS
RESPIRATION RATE: 16 BRPM | WEIGHT: 165.34 LBS | DIASTOLIC BLOOD PRESSURE: 111 MMHG | TEMPERATURE: 98.6 F | OXYGEN SATURATION: 100 % | BODY MASS INDEX: 23.67 KG/M2 | HEIGHT: 70 IN | HEART RATE: 94 BPM | SYSTOLIC BLOOD PRESSURE: 184 MMHG

## 2025-02-23 DIAGNOSIS — S61.211A LACERATION OF LEFT INDEX FINGER WITHOUT FOREIGN BODY WITHOUT DAMAGE TO NAIL, INITIAL ENCOUNTER: Primary | ICD-10-CM

## 2025-02-23 PROCEDURE — 25010000002 LIDOCAINE 2% SOLUTION

## 2025-02-23 PROCEDURE — 99283 EMERGENCY DEPT VISIT LOW MDM: CPT

## 2025-02-23 RX ORDER — CEPHALEXIN 500 MG/1
500 CAPSULE ORAL 2 TIMES DAILY
Qty: 10 CAPSULE | Refills: 0 | Status: SHIPPED | OUTPATIENT
Start: 2025-02-23 | End: 2025-02-28

## 2025-02-23 RX ORDER — LIDOCAINE HYDROCHLORIDE 20 MG/ML
10 INJECTION, SOLUTION INFILTRATION; PERINEURAL ONCE
Status: COMPLETED | OUTPATIENT
Start: 2025-02-23 | End: 2025-02-23

## 2025-02-23 RX ADMIN — LIDOCAINE HYDROCHLORIDE 10 ML: 20 INJECTION, SOLUTION INFILTRATION; PERINEURAL at 15:01

## 2025-02-23 NOTE — DISCHARGE INSTRUCTIONS
Please be sure to follow-up with your primary care provider in 7 to 10 days to have your wound reevaluated and sutures removed.  Please take all the antibiotics prescribed today as directed to help prevent infection from forming in your hand.  Please be sure to wash your hands thoroughly 2-3 times a day and then pat them dry.  When working please keep them covered however when you are at home and is okay to remove the splint and dressing so that your wound can breathe.  If it anytime you feel that your wound is becoming infected, if you become unable to use your finger or hand, or if you develop a fever please return to emergency department.  Otherwise follow-up with your primary care provider in 7 to 10 days.

## 2025-02-23 NOTE — ED PROVIDER NOTES
Time: 2:44 PM EST  Date of encounter:  2/23/2025  Room number: 57/57  Independent Historian/Clinical History and Information was obtained by:   Patient    History is limited by: N/A    Chief Complaint: finger laceration       History of Present Illness:  Patient is a 54 y.o. year old male who presents to the emergency department for evaluation of left finger laceration.  Patient was hanging drywall and went to make a cut with his  when he slipped and cut his left index finger.  He is right-hand dominant.  He is up-to-date on his tetanus shot.    HPI    Patient Care Team  Primary Care Provider: Sonia Pierce MD    Past Medical History:     Allergies   Allergen Reactions    Coconut Anaphylaxis    Morphine Anaphylaxis and Swelling     Past Medical History:   Diagnosis Date    CHF (congestive heart failure) May 2021    no recent issues    Coronary artery disease May 2021    follows w/[facility], no c/o cp or soa, workup 09/2022, instructed to come back in a year    End stage renal failure on dialysis     currently using right side  TDC, dialysis T, T, S    History of COVID-19 09/2020    Hypertension Sept. 1990    Myocardial infarction 2021     Past Surgical History:   Procedure Laterality Date    APPENDECTOMY  1994    ARTERIOVENOUS FISTULA/SHUNT SURGERY Left 11/8/2022    Procedure: CREATION OF ARTERIOVENOUS FISTULA LEFT ARM;  Surgeon: Livan Choi MD;  Location: [facility] OR;  Service: Vascular;  Laterality: Left;    INSERTION HEMODIALYSIS CATHETER Right 9/2/2022    Procedure: HEMODIALYSIS CATHETER INSERTION;  Surgeon: Mikey Perez MD;  Location: [facility] MAIN OR;  Service: Vascular;  Laterality: Right;    INSERTION HEMODIALYSIS CATHETER N/A 10/24/2022    Procedure: HEMODIALYSIS CATHETER INSERTION exchange over guidewire.;  Surgeon: Mikey Perez MD;  Location: [facility] MAIN OR;  Service: Vascular;  Laterality: N/A;    INSERTION PERITONEAL DIALYSIS CATHETER      INSERTION PERITONEAL DIALYSIS CATHETER N/A  7/11/2022    Procedure: DIAGNOSTIC LAPAROSCOPY,  peritoneal dialysis pexy;  Surgeon: Danie Morocho MD;  Location: [facility] MAIN OR;  Service: General;  Laterality: N/A;    REMOVAL PERITONEAL DIALYSIS CATHETER N/A 10/20/2022    Procedure: REMOVAL PERITONEAL DIALYSIS CATHETER;  Surgeon: Matthew Arshad MD;  Location: [facility] OR OSC;  Service: General;  Laterality: N/A;    SHUNT O GRAM Left 4/26/2024    Procedure: Left arm fistulogram, possible angioplasty or stenting;  Surgeon: Livan Choi MD;  Location: [facility] CATH INVASIVE LOCATION;  Service: Peripheral Vascular;  Laterality: Left;    SHUNT O GRAM N/A 5/24/2024    Procedure: Left upper extremity fistulagram with possible intervention, possible tunneled dialysis catheter placement;  Surgeon: Mikey Perez MD;  Location: [facility] CATH INVASIVE LOCATION;  Service: Interventional Radiology;  Laterality: N/A;    SHUNT O GRAM Left 10/31/2024    Procedure: Left arm fistulogram, possible angioplasty or stenting /  Image the left arm dialysis access and open up any blockages;  Surgeon: Livan Choi MD;  Location: [facility] CATH INVASIVE LOCATION;  Service: Peripheral Vascular;  Laterality: Left;     Family History   Problem Relation Age of Onset    Cancer Mother     Hypertension Mother     Heart disease Maternal Grandfather     Malig Hyperthermia Neg Hx        Home Medications:  Prior to Admission medications    Medication Sig Start Date End Date Taking? Authorizing Provider   aspirin 81 MG EC tablet Take 1 tablet by mouth Daily. 1/27/22   Nick Santiago MD   carvedilol (COREG) 25 MG tablet Take 1 tablet by mouth 2 (Two) Times a Day. 3/15/22   Nick Santiago MD   cloNIDine (CATAPRES) 0.2 MG tablet Take 1 tablet by mouth 3 (Three) Times a Day. 7/5/22   Nick Santiago MD   clopidogrel (PLAVIX) 75 MG tablet Take 1 tablet by mouth Daily. 5/25/24 5/25/25  Mikey Perez MD   eplerenone (INSPRA) 25 MG tablet Take 1 tablet by mouth Daily.  "5/16/22   Nick Santiago MD   furosemide (LASIX) 40 MG tablet Take 1 tablet by mouth 2 (Two) Times a Day. 1/27/22   Nick Santiago MD   hydrALAZINE (APRESOLINE) 100 MG tablet Take 1 tablet by mouth 3 (Three) Times a Day. 4/7/22   Nick Santiago MD   NIFEdipine CC (ADALAT CC) 90 MG 24 hr tablet Take 1 tablet by mouth 2 (Two) Times a Day. 3/29/24   Nick Santiago MD        Social History:   Social History     Tobacco Use    Smoking status: Every Day     Current packs/day: 0.50     Average packs/day: 1 pack/day for 37.1 years (36.0 ttl pk-yrs)     Types: Cigarettes     Start date: 3/15/1987     Last attempt to quit: 2/20/2022     Passive exposure: Current    Smokeless tobacco: Never    Tobacco comments:     Last smoked this am 0600 4/26   Vaping Use    Vaping status: Some Days    Substances: Flavoring    Devices: Pre-filled or refillable cartridge   Substance Use Topics    Alcohol use: Never    Drug use: Never         Review of Systems:  Review of Systems     I performed a review of systems today, which was all negative, except for the positives found in the HPI above.    Physical Exam:  BP (!) 184/111 (BP Location: Right arm, Patient Position: Sitting)   Pulse 94   Temp 98.6 °F (37 °C) (Oral)   Resp 16   Ht 177.8 cm (70\")   Wt 75 kg (165 lb 5.5 oz)   SpO2 100%   BMI 23.72 kg/m²     Physical Exam   General:  Awake alert no apparent distress    Head: Normocephalic, atraumatic, eyes PERRLA EOMI, nose normal, oropharynx normal    Neck: Supple, no meningismus, no cervical lymphadenopathy or JVD    Heart: Regular rate and rhythm, no murmurs or rubs, 2+ radial pulses    Lungs: Clear to auscultation bilaterally, no wheezing or rhonchi or rales, no respiratory distress    Abdomen: Soft, nontender, nondistended, no signs of peritonitis    Skin: Warm, dry, no rash.  An approximate 1.5 cm laceration to left index finger on the medial side of the PIP joint.    Musculoskeletal: Normal range of " motion, no obvious deformities, no edema noted    Neurologic: Awake, alert, oriented x 3, no motor or sensory deficits appreciated    Psych: No suicidal or homicidal ideations, no psychosis          Procedures:  Laceration Repair    Date/Time: 2/23/2025 4:14 PM    Performed by: Giovani Hardin PA-C  Authorized by: Coy Ricketts DO    Consent:     Consent obtained:  Verbal    Consent given by:  Patient    Risks, benefits, and alternatives were discussed: yes      Risks discussed:  Pain, poor cosmetic result and poor wound healing  Universal protocol:     Patient identity confirmed:  Verbally with patient  Anesthesia:     Anesthesia method:  Nerve block    Block needle gauge:  25 G    Block anesthetic:  Lidocaine 2% w/o epi    Block technique:  Ring block    Block injection procedure:  Anatomic landmarks identified, introduced needle, negative aspiration for blood and incremental injection    Block outcome:  Anesthesia achieved  Laceration details:     Location:  Finger    Finger location:  L index finger    Length (cm):  1.5    Depth (mm):  2  Pre-procedure details:     Preparation:  Patient was prepped and draped in usual sterile fashion  Exploration:     Limited defect created (wound extended): yes      Hemostasis achieved with:  Direct pressure    Wound exploration: wound explored through full range of motion and entire depth of wound visualized      Wound extent: no tendon damage noted      Contaminated: no    Treatment:     Area cleansed with:  Povidone-iodine    Amount of cleaning:  Standard    Irrigation solution:  Sterile water    Irrigation volume:  20    Irrigation method:  Syringe    Visualized foreign bodies/material removed: no      Debridement:  None    Undermining:  None  Skin repair:     Repair method:  Sutures    Suture size:  3-0    Suture material:  Nylon    Suture technique:  Simple interrupted    Number of sutures:  2  Approximation:     Approximation:  Close  Repair type:     Repair  type:  Simple  Post-procedure details:     Dressing:  Non-adherent dressing and splint for protection    Procedure completion:  Tolerated well, no immediate complications  Comments:      Jaylen DELATORRE completed procedure under supervision of Giovani Hardin PA-C        Medical Decision Making:      Comorbidities that affect care:    Chronic Kidney Disease    External Notes reviewed:          The following orders were placed and all results were independently analyzed by me:  Orders Placed This Encounter   Procedures    Laceration Repair    Obtain & Apply The Following- Upper extremity; Finger splint       Medications Given in the Emergency Department:  Medications   lidocaine (XYLOCAINE) 2% injection 10 mL (10 mL Infiltration Given 2/23/25 1501)        ED Course:    ED Course as of 02/23/25 2115   Sun Feb 23, 2025   1538 Ring block started [MD]      ED Course User Index  [MD] Giovani Hardin PA-C       Labs:    Lab Results (last 24 hours)       ** No results found for the last 24 hours. **             Imaging:    No Radiology Exams Resulted Within Past 24 Hours      Differential Diagnosis and Discussion:    Laceration: Laceration was evaluated for arterial injury, ligamentous damage, and other neurovascular injury.        [facility]                 Patient Care Considerations:    I considered completing x-ray of digit however entire depth of wound could be visually assessed and there was no retained foreign body.      Consultants/Shared Management Plan:    None    Social Determinants of Health:    Patient is independent, reliable, and has access to care.       Disposition and Care Coordination:    Discharged: The patient is suitable and stable for discharge with no need for consideration of admission.    I have explained the patient´s condition, diagnoses and treatment plan based on the information available to me at this time. I have answered questions and addressed any concerns. The patient has a good  understanding  of the patient´s diagnosis, condition, and treatment plan as can be expected at this point. The vital signs have been stable. The patient´s condition is stable and appropriate for discharge from the emergency department.      The patient will pursue further outpatient evaluation with the primary care physician or other designated or consulting physician as outlined in the discharge instructions. They are agreeable to this plan of care and follow-up instructions have been explained in detail. The patient has received these instructions in written format and has expressed an understanding of the discharge instructions. The patient is aware that any significant change in condition or worsening of symptoms should prompt an immediate return to this or the closest emergency department or call to 911.    Final diagnoses:   Laceration of left index finger without foreign body without damage to nail, initial encounter        ED Disposition       ED Disposition   Discharge    Condition   Stable    Comment   --               This medical record created using voice recognition software.       Radha Camarena, APRN  02/23/25 4117

## 2025-04-23 ENCOUNTER — OFFICE VISIT (OUTPATIENT)
Dept: FAMILY MEDICINE CLINIC | Facility: CLINIC | Age: 55
End: 2025-04-23
Payer: MEDICARE

## 2025-04-23 VITALS
BODY MASS INDEX: 26.2 KG/M2 | HEIGHT: 70 IN | RESPIRATION RATE: 16 BRPM | TEMPERATURE: 98 F | SYSTOLIC BLOOD PRESSURE: 134 MMHG | HEART RATE: 86 BPM | DIASTOLIC BLOOD PRESSURE: 80 MMHG | WEIGHT: 183 LBS | OXYGEN SATURATION: 98 %

## 2025-04-23 DIAGNOSIS — I10 PRIMARY HYPERTENSION: ICD-10-CM

## 2025-04-23 DIAGNOSIS — Z99.2 ESRD ON DIALYSIS: ICD-10-CM

## 2025-04-23 DIAGNOSIS — R73.01 IMPAIRED FASTING BLOOD SUGAR: ICD-10-CM

## 2025-04-23 DIAGNOSIS — N18.6 ESRD ON DIALYSIS: ICD-10-CM

## 2025-04-23 DIAGNOSIS — Z00.00 MEDICARE ANNUAL WELLNESS VISIT, SUBSEQUENT: Primary | ICD-10-CM

## 2025-04-23 DIAGNOSIS — Z12.11 ENCOUNTER FOR SCREENING FOR MALIGNANT NEOPLASM OF COLON: ICD-10-CM

## 2025-04-23 DIAGNOSIS — F51.01 PRIMARY INSOMNIA: ICD-10-CM

## 2025-04-23 DIAGNOSIS — Z86.19 HISTORY OF HEPATITIS C: ICD-10-CM

## 2025-04-23 RX ORDER — TRAZODONE HYDROCHLORIDE 50 MG/1
50 TABLET ORAL NIGHTLY
Qty: 30 TABLET | Refills: 1 | Status: SHIPPED | OUTPATIENT
Start: 2025-04-23

## 2025-04-23 NOTE — PROGRESS NOTES
Subjective   The ABCs of the Annual Wellness Visit  Medicare Wellness Visit      Radha Hutchison is a 54 y.o. patient who presents for a Medicare Wellness Visit.    The following portions of the patient's history were reviewed and   updated as appropriate: allergies, current medications, past family history, past medical history, past social history, past surgical history, and problem list.    Compared to one year ago, the patient's physical   health is the same.  Compared to one year ago, the patient's mental   health is the same.    Recent Hospitalizations:  This patient has had a [facility] admission record on file within the last 365 days.  Current Medical Providers:  Patient Care Team:  Sonia Pierce MD as PCP - General (Family Medicine)  Jonathon Vallejo MD as Consulting Physician (Nephrology)  Katie Mayorga APRN as Nurse Practitioner (Nurse Practitioner)    Outpatient Medications Prior to Visit   Medication Sig Dispense Refill    aspirin 81 MG EC tablet Take 1 tablet by mouth Daily.      carvedilol (COREG) 25 MG tablet Take 1 tablet by mouth 2 (Two) Times a Day.      cloNIDine (CATAPRES) 0.2 MG tablet Take 1 tablet by mouth 3 (Three) Times a Day.      clopidogrel (PLAVIX) 75 MG tablet Take 1 tablet by mouth Daily. 30 tablet 11    eplerenone (INSPRA) 25 MG tablet Take 1 tablet by mouth Daily.      furosemide (LASIX) 40 MG tablet Take 1 tablet by mouth 2 (Two) Times a Day.      hydrALAZINE (APRESOLINE) 100 MG tablet Take 1 tablet by mouth 3 (Three) Times a Day.      NIFEdipine CC (ADALAT CC) 90 MG 24 hr tablet Take 1 tablet by mouth 2 (Two) Times a Day.       No facility-administered medications prior to visit.     No opioid medication identified on active medication list. I have reviewed chart for other potential  high risk medication/s and harmful drug interactions in the elderly.      Aspirin is on active medication list. Aspirin use is indicated based on review of current medical  "condition/s. Pros and cons of this therapy have been discussed today. Benefits of this medication outweigh potential harm.  Patient has been encouraged to continue taking this medication.  .      Patient Active Problem List   Diagnosis    CHF (congestive heart failure)    Hypertension    CAD (coronary artery disease)    End stage renal failure on dialysis    Chronic kidney disease    ESRD on dialysis    Dialysis AV fistula malfunction, initial encounter    Inadequate flow of hemodialysis AV fistula    Dialysis AV fistula malfunction     Advance Care Planning Advance Directive is not on file.  ACP discussion was held with the patient during this visit. Patient does not have an advance directive, information provided.            Objective   Vitals:    04/23/25 1300   BP: 134/80   Pulse: 86   Resp: 16   Temp: 98 °F (36.7 °C)   TempSrc: Temporal   SpO2: 98%   Weight: 83 kg (183 lb)   Height: 177.8 cm (70\")   PainSc: 0-No pain       Estimated body mass index is 26.26 kg/m² as calculated from the following:    Height as of this encounter: 177.8 cm (70\").    Weight as of this encounter: 83 kg (183 lb).    BMI is >= 25 and <30. (Overweight) The following options were offered after discussion;: weight loss educational material (shared in after visit summary), exercise counseling/recommendations, and nutrition counseling/recommendations           Does the patient have evidence of cognitive impairment? No                                                                                               Health  Risk Assessment    Smoking Status:  Social History     Tobacco Use   Smoking Status Every Day    Current packs/day: 0.50    Average packs/day: 1 pack/day for 37.2 years (36.1 ttl pk-yrs)    Types: Cigarettes    Start date: 3/15/1987    Last attempt to quit: 2/20/2022    Passive exposure: Current   Smokeless Tobacco Never   Tobacco Comments    Last smoked this am 0600 4/26     Alcohol Consumption:  Social History     Substance " and Sexual Activity   Alcohol Use Never       Fall Risk Screen  STEADI Fall Risk Assessment was completed, and patient is at LOW risk for falls.Assessment completed on:2025    Depression Screening   Little interest or pleasure in doing things? Not at all   Feeling down, depressed, or hopeless? Not at all   PHQ-2 Total Score 0      Health Habits and Functional and Cognitive Screenin/23/2025     1:00 PM   Functional & Cognitive Status   Do you have difficulty preparing food and eating? No   Do you have difficulty bathing yourself, getting dressed or grooming yourself? No   Do you have difficulty using the toilet? No   Do you have difficulty moving around from place to place? No   Do you have trouble with steps or getting out of a bed or a chair? No   Current Diet Well Balanced Diet   Dental Exam Not up to date   Eye Exam Not up to date   Exercise (times per week) 0 times per week   Current Exercises Include No Regular Exercise   Do you need help using the phone?  No   Are you deaf or do you have serious difficulty hearing?  No   Do you need help to go to places out of walking distance? No   Do you need help shopping? No   Do you need help preparing meals?  No   Do you need help with housework?  No   Do you need help with laundry? No   Do you need help taking your medications? No   Do you need help managing money? No   Do you ever drive or ride in a car without wearing a seat belt? No   Have you felt unusual stress, anger or loneliness in the last month? No   Who do you live with? Other   If you need help, do you have trouble finding someone available to you? No   Have you been bothered in the last four weeks by sexual problems? No   Do you have difficulty concentrating, remembering or making decisions? No           Age-appropriate Screening Schedule:  Refer to the list below for future screening recommendations based on patient's age, sex and/or medical conditions. Orders for these recommended tests are  listed in the plan section. The patient has been provided with a written plan.    Health Maintenance List  Health Maintenance   Topic Date Due    COLORECTAL CANCER SCREENING  Never done    Hepatitis B (2 of 2 - CpG 2-dose series) 10/19/2022    COVID-19 Vaccine (1 - 2024-25 season) 05/07/2025 (Originally 9/1/2024)    ZOSTER VACCINE (1 of 2) 10/20/2025 (Originally 6/28/2022)    INFLUENZA VACCINE  07/01/2025    LUNG CANCER SCREENING  12/02/2025    ANNUAL WELLNESS VISIT  04/23/2026    TDAP/TD VACCINES (2 - Td or Tdap) 10/23/2034    HEPATITIS C SCREENING  Completed    Pneumococcal Vaccine 50+  Completed                                                                                                                                                CMS Preventative Services Quick Reference  Risk Factors Identified During Encounter  Dental Screening Recommended  Vision Screening Recommended    The above risks/problems have been discussed with the patient.  Pertinent information has been shared with the patient in the After Visit Summary.  An After Visit Summary and PPPS were made available to the patient.    Follow Up:   Next Medicare Wellness visit to be scheduled in 1 year.     Assessment & Plan  History of hepatitis C    Orders:    TSH Rfx On Abnormal To Free T4; Future    CBC & Differential; Future    Comprehensive Metabolic Panel; Future    Hemoglobin A1c; Future    Lipid Panel; Future    Urinalysis With Microscopic - Urine, Clean Catch; Future    ESRD on dialysis      Orders:    TSH Rfx On Abnormal To Free T4; Future    CBC & Differential; Future    Comprehensive Metabolic Panel; Future    Hemoglobin A1c; Future    Lipid Panel; Future    Urinalysis With Microscopic - Urine, Clean Catch; Future    Primary hypertension      Orders:    TSH Rfx On Abnormal To Free T4; Future    CBC & Differential; Future    Comprehensive Metabolic Panel; Future    Hemoglobin A1c; Future    Lipid Panel; Future    Urinalysis With Microscopic -  Urine, Clean Catch; Future    Medicare annual wellness visit, subsequent    Orders:    TSH Rfx On Abnormal To Free T4; Future    CBC & Differential; Future    Comprehensive Metabolic Panel; Future    Hemoglobin A1c; Future    Lipid Panel; Future    Urinalysis With Microscopic - Urine, Clean Catch; Future    Encounter for screening for malignant neoplasm of colon    Orders:    Hemoglobin A1c; Future    Cologuard - Stool, Per Rectum; Future    Primary insomnia    Orders:    Hemoglobin A1c; Future    traZODone (DESYREL) 50 MG tablet; Take 1 tablet by mouth Every Night.    Impaired fasting blood sugar    Orders:    Hemoglobin A1c; Future         Follow Up:   Return in about 6 months (around 10/23/2025) for Next scheduled follow up.

## 2025-04-23 NOTE — ASSESSMENT & PLAN NOTE
Orders:    TSH Rfx On Abnormal To Free T4; Future    CBC & Differential; Future    Comprehensive Metabolic Panel; Future    Hemoglobin A1c; Future    Lipid Panel; Future    Urinalysis With Microscopic - Urine, Clean Catch; Future

## 2025-05-09 ENCOUNTER — TELEPHONE (OUTPATIENT)
Dept: FAMILY MEDICINE CLINIC | Facility: CLINIC | Age: 55
End: 2025-05-09
Payer: MEDICARE

## 2025-05-09 DIAGNOSIS — F51.01 PRIMARY INSOMNIA: Primary | ICD-10-CM

## 2025-05-09 RX ORDER — QUETIAPINE FUMARATE 25 MG/1
25 TABLET, FILM COATED ORAL NIGHTLY
Qty: 30 TABLET | Refills: 1 | Status: SHIPPED | OUTPATIENT
Start: 2025-05-09

## 2025-05-09 NOTE — TELEPHONE ENCOUNTER
Caller: Radha Hutchison    Relationship to patient: Self    Best call back number: [phone]    Patient is needing: PATIENT IS REQUESTING A CALL BACK FROM DR. DOMINGUEZ . HE STATED THAT THE 50 MG NOT WORKING SO HE BEEN TAKING 3 PILLS IN ORDER FOR THE MEDICATION TO WORK. PLEASE CALL AND ADVISE.

## 2025-05-12 ENCOUNTER — OFFICE VISIT (OUTPATIENT)
Age: 55
End: 2025-05-12
Payer: MEDICARE

## 2025-05-12 VITALS
RESPIRATION RATE: 18 BRPM | HEART RATE: 82 BPM | BODY MASS INDEX: 26.2 KG/M2 | SYSTOLIC BLOOD PRESSURE: 160 MMHG | DIASTOLIC BLOOD PRESSURE: 108 MMHG | HEIGHT: 70 IN | TEMPERATURE: 98.1 F | WEIGHT: 183 LBS | OXYGEN SATURATION: 97 %

## 2025-05-12 DIAGNOSIS — T82.590A DIALYSIS AV FISTULA MALFUNCTION, INITIAL ENCOUNTER: Primary | ICD-10-CM

## 2025-05-12 PROCEDURE — 3080F DIAST BP >= 90 MM HG: CPT | Performed by: SURGERY

## 2025-05-12 PROCEDURE — 1160F RVW MEDS BY RX/DR IN RCRD: CPT | Performed by: SURGERY

## 2025-05-12 PROCEDURE — 1159F MED LIST DOCD IN RCRD: CPT | Performed by: SURGERY

## 2025-05-12 PROCEDURE — 99213 OFFICE O/P EST LOW 20 MIN: CPT | Performed by: SURGERY

## 2025-05-12 PROCEDURE — 3077F SYST BP >= 140 MM HG: CPT | Performed by: SURGERY

## 2025-05-12 NOTE — PROGRESS NOTES
[facility]   Follow up Office    Patient Name: Radha Hutchison  : 1970  MRN: [id number]  Primary Care Physician:  Sonia Pierce MD      Subjective   Subjective     HPI:    Radha Hutchison is a 54 y.o. male with a left arm fistula created in .  He was last seen and underwent a fistulogram on 10/31/2024.  He indicates that he was doing very well but most recently is having significant pain during dialysis with throbbing.  He indicates that it becomes unbearable.  He wakes up in the morning and sometimes there are areas of bruising but not necessarily anywhere near he was accessed.      Objective     Vitals:   Temp:  [98.1 °F (36.7 °C)] 98.1 °F (36.7 °C)  Heart Rate:  [82] 82  Resp:  [18] 18  BP: (160)/(108) 160/108    Physical Exam      General: Alert, no acute distress.  HEENT: PERRLA  Abdomen: Benign  Extremities: Symmetric.  Left arm fistula: Diffusely aneurysmal with hyper pulsatility  Neuro: No gross deficits    Assessment & Plan   Assessment / Plan     Diagnoses and all orders for this visit:    1. Dialysis AV fistula malfunction, initial encounter (Primary)  -     Case Request; Standing  -     ceFAZolin (ANCEF) 2,000 mg in sodium chloride 0.9 % 100 mL IVPB  -     Case Request    Other orders  -     Follow Anesthesia Guidelines / Protocol; Future  -     Follow Anesthesia Guidelines / Protocol; Standing  -     Verify NPO Status; Standing  -     CBC & Differential; Standing  -     Basic Metabolic Panel; Standing  -     Insert Peripheral IV; Standing  -     Saline Lock & Maintain IV Access; Standing  -     VTE Prophylaxis Not Indicated:; Standing       Assessment/Plan:   Tracey left arm fistula has evidence of outflow obstruction.  Plan a left arm fistulogram with possible endovascular intervention.  I have discussed with the patient in detail the mechanics of the procedure, the indications, benefits, risks, alternatives as well as potential complications to include but not limited to  infection, bleeding, inability to improve the fistula, vascular injury requiring surgical repair.  He appears to understand and desires to proceed.        Electronically signed by Livan Choi MD, 05/12/25, 9:26 AM EDT.

## 2025-05-20 ENCOUNTER — ANESTHESIA EVENT (OUTPATIENT)
Dept: PERIOP | Facility: HOSPITAL | Age: 55
End: 2025-05-20
Payer: MEDICARE

## 2025-05-20 ENCOUNTER — OFFICE VISIT (OUTPATIENT)
Age: 55
End: 2025-05-20
Payer: MEDICARE

## 2025-05-20 VITALS
HEART RATE: 82 BPM | BODY MASS INDEX: 26.2 KG/M2 | TEMPERATURE: 98.1 F | OXYGEN SATURATION: 97 % | SYSTOLIC BLOOD PRESSURE: 215 MMHG | RESPIRATION RATE: 18 BRPM | HEIGHT: 70 IN | DIASTOLIC BLOOD PRESSURE: 140 MMHG | WEIGHT: 183 LBS

## 2025-05-20 DIAGNOSIS — T82.590S MALFUNCTION OF ARTERIOVENOUS DIALYSIS FISTULA, SEQUELA: Primary | ICD-10-CM

## 2025-05-20 NOTE — H&P (VIEW-ONLY)
"Chief Complaint  Hemodialysis Access and Follow-up (Mr. Hutchison is a 54 year old male that presents to the clinic today with a concern regarding his left arm fistula. Patient gets treatment at [facility] every Monday, Wednesday, & Friday. Patient states when he is connected to the dialysis treatment machine, he has an area near his left subclavian that \" bulges out\". Fistula is positive for thrill and bruit. (See photo). )    Subjective        Radha ROSA Hutchison presents to [facility] VASCULAR SURGERY  History of Present Illness  Patient 54-year-old gentleman with past medical history typical for end-stage renal disease receiving dialysis through a left radiocephalic AV fistula.  He has not been able to tolerate full sessions of dialysis due to severe pain on the aneurysmal portions of the fistula during dialysis.  Mainly around the supraclavicular area.  Patient states he had previous fistulogram's in the past which improved his symptoms for short time and then pain recurs and gets worse.  Denies any chest pain or shortness of breath.  Denies any left upper extremity pain numbness or tingling.    Objective   Vital Signs:  BP (!) 215/140 (BP Location: Right arm, Patient Position: Sitting, Cuff Size: Large Adult) Comment: PHYSICIAN AWARE  Pulse 82   Temp 98.1 °F (36.7 °C) (Oral)   Resp 18   Ht 177.8 cm (70\")   Wt 83 kg (183 lb)   SpO2 97%   BMI 26.26 kg/m²   Estimated body mass index is 26.26 kg/m² as calculated from the following:    Height as of this encounter: 177.8 cm (70\").    Weight as of this encounter: 83 kg (183 lb).            Physical Exam   Result Review :  Awake alert in no acute distress  Chest: S1-S2 present  Clear to auscultation bilaterally.  Left upper extremity: Pulsatile, tense throughout, multiple areas of aneurysm.  Skin intact, no wounds.  Motor sensory grossly intact.  Palpable radial pulse         Assessment and Plan   Diagnoses and all orders for this visit:    1. " Malfunction of arteriovenous dialysis fistula, sequela (Primary)  -     Case Request; Standing  -     Case Request    Plan:  Patient will be scheduled for a left upper extremity AV fistula revision.  On exam there is significant exit cephalic at the axillary region.  We can try to transect the cephalic vein and mobilize it to the brachial or axillary deep venous system.  And this will avoid further enlargement of pressure on the cephalic vein around the chest.  I described the procedure, benefits alternatives and risk including but not omitted to infection, bleeding, fistula thrombosis that could lead to catheter.  Patient states that he prefers to catheterize this morning due to the significant pain he is having through the fistula.  I reviewed the current findings and plan with the patient in detail.  All his questions were answered.  He agrees with current plan.       I spent 45 minutes caring for Radha on this date of service. This time includes time spent by me in the following activities:preparing for the visit, reviewing tests, obtaining and/or reviewing a separately obtained history, and performing a medically appropriate examination and/or evaluation   Follow Up   No follow-ups on file.  Patient was given instructions and counseling regarding his condition or for health maintenance advice. Please see specific information pulled into the AVS if appropriate.

## 2025-05-20 NOTE — PRE-PROCEDURE INSTRUCTIONS
PATIENT INSTRUCTED TO BE:    - NOTHING TO EAT AFTER MIDNIGHT OR CHEW, EXCEPT CAN HAVE SIPS OF WATER WITH MEDICATIONS OR CLEAR LIQUIDS 2 HOURS PRIOR TO SURGERY ARRIVAL TIME , NO MORE THAN 8 OZ. (NOTHING RED)     - TO HOLD ALL VITAMINS, SUPPLEMENTS, NSAIDS FOR ONE WEEK PRIOR TO THEIR SURGICAL PROCEDURE    - DO NOT TAKE ______________________ 7 DAYS PRIOR TO PROCEDURE PER ANESTHESIA RECOMMENDATIONS/INSTRUCTIONS     - INSTRUCTED PT TO USE SURGICAL SOAP 1 TIME THE NIGHT PRIOR TO SURGERY ___________ OR THE AM OF SURGERY _____________   USE THE SOAP FROM NECK TO TOES, AVOID THEIR FACE, HAIR, AND PRIVATE PARTS. IF USE THE SOAP THE NIGHT PRIOR TO SURGERY, CHANGE BED LINENS AND NO PETS IN THE BED.     INSTRUCTED NO LOTIONS, JEWELRY, PIERCINGS,  NAIL POLISH, OR DEODORANT DAY OF SURGERY    - IF DIABETIC, CHECK BLOOD GLUCOSE IF LESS THAN 70 OR HAVING SYMPTOMS CALL THE PREOP AREA FOR INSTRUCTIONS ON AM OF SURGERY ([phone]  -INSTRUCTED TO TAKE THE FOLLOWING MEDICATIONS THE DAY OF SURGERY WITH SIPS OF WATER:     COREG, CLONIDINE, HYDRALAZINE, NIFEDIPINE      - DO NOT BRING ANY MEDICATIONS WITH YOU TO THE HOSPITAL THE DAY OF SURGERY, EXCEPT IF USE INHALERS. BRING INHALERS DAY OF SURGERY       - BRING CPAP OR BIPAP TO THE HOSPITAL ONLY IF YOU ARE SPENDING THE NIGHT    - DO NOT SMOKE OR VAPE 24 HOURS PRIOR TO PROCEDURE PER ANESTHESIA REQUEST     -MAKE SURE YOU HAVE A RIDE HOME OR SOMEONE TO STAY WITH YOU THE DAY OF THE PROCEDURE AFTER YOU GO HOME     - FOLLOW ANY OTHER INSTRUCTIONS GIVEN TO YOU BY YOUR SURGEON'S OFFICE.     - DAY OF SURGERY ____________,[facility] ( [address]), YOU CAN  PARK OR SELF PARK. ENTER THE PAVILION THRU MAIN ENTRANCE, TAKE ELEVATORS TO THE FIRST FLOOR, CHECK IN AT THE DESK FOR REGISTRATION/ SURGERY.  - YOU WILL RECEIVE A PHONE CALL THE DAY PRIOR TO SURGERY BETWEEN 1PM AND 4 PM WITH ARRIVAL TIME, IF YOUR SURGERY IS ON A MONDAY YOU WILL RECEIVE A CALL THE FRIDAY PRIOR  TO SURGERY DATE    - BRING CASH OR CREDIT CARD FOR COPAYMENT OF MEDICATIONS AFTER SURGERY IF YOU USE THE HOSPITAL PHARMACY (MEDS TO BED)    - PREADMISSION TESTING NURSE [phone] IF HAVE ANY QUESTIONS     -PATIENT PROVIDED THE NUMBER FOR PREOP SURGICAL DEPT IF HAD QUESTIONS AFTER HOURS PRIOR TO SURGERY ([phone]   INFORMED PT IF NO ANSWER, LEAVE A MESSAGE AND SOMEONE WILL RETURN THEIR CALL       PATIENT VERBALIZED UNDERSTANDING

## 2025-05-20 NOTE — PROGRESS NOTES
"Chief Complaint  Hemodialysis Access and Follow-up (Mr. Hutchison is a 54 year old male that presents to the clinic today with a concern regarding his left arm fistula. Patient gets treatment at [facility] every Monday, Wednesday, & Friday. Patient states when he is connected to the dialysis treatment machine, he has an area near his left subclavian that \" bulges out\". Fistula is positive for thrill and bruit. (See photo). )    Subjective        Radha ROSA Hutchison presents to [facility] VASCULAR SURGERY  History of Present Illness  Patient 54-year-old gentleman with past medical history typical for end-stage renal disease receiving dialysis through a left radiocephalic AV fistula.  He has not been able to tolerate full sessions of dialysis due to severe pain on the aneurysmal portions of the fistula during dialysis.  Mainly around the supraclavicular area.  Patient states he had previous fistulogram's in the past which improved his symptoms for short time and then pain recurs and gets worse.  Denies any chest pain or shortness of breath.  Denies any left upper extremity pain numbness or tingling.    Objective   Vital Signs:  BP (!) 215/140 (BP Location: Right arm, Patient Position: Sitting, Cuff Size: Large Adult) Comment: PHYSICIAN AWARE  Pulse 82   Temp 98.1 °F (36.7 °C) (Oral)   Resp 18   Ht 177.8 cm (70\")   Wt 83 kg (183 lb)   SpO2 97%   BMI 26.26 kg/m²   Estimated body mass index is 26.26 kg/m² as calculated from the following:    Height as of this encounter: 177.8 cm (70\").    Weight as of this encounter: 83 kg (183 lb).            Physical Exam   Result Review :  Awake alert in no acute distress  Chest: S1-S2 present  Clear to auscultation bilaterally.  Left upper extremity: Pulsatile, tense throughout, multiple areas of aneurysm.  Skin intact, no wounds.  Motor sensory grossly intact.  Palpable radial pulse         Assessment and Plan   Diagnoses and all orders for this visit:    1. " Malfunction of arteriovenous dialysis fistula, sequela (Primary)  -     Case Request; Standing  -     Case Request    Plan:  Patient will be scheduled for a left upper extremity AV fistula revision.  On exam there is significant exit cephalic at the axillary region.  We can try to transect the cephalic vein and mobilize it to the brachial or axillary deep venous system.  And this will avoid further enlargement of pressure on the cephalic vein around the chest.  I described the procedure, benefits alternatives and risk including but not omitted to infection, bleeding, fistula thrombosis that could lead to catheter.  Patient states that he prefers to catheterize this morning due to the significant pain he is having through the fistula.  I reviewed the current findings and plan with the patient in detail.  All his questions were answered.  He agrees with current plan.       I spent 45 minutes caring for Radha on this date of service. This time includes time spent by me in the following activities:preparing for the visit, reviewing tests, obtaining and/or reviewing a separately obtained history, and performing a medically appropriate examination and/or evaluation   Follow Up   No follow-ups on file.  Patient was given instructions and counseling regarding his condition or for health maintenance advice. Please see specific information pulled into the AVS if appropriate.

## 2025-05-21 ENCOUNTER — ANESTHESIA (OUTPATIENT)
Dept: PERIOP | Facility: HOSPITAL | Age: 55
End: 2025-05-21
Payer: MEDICARE

## 2025-05-21 ENCOUNTER — ANCILLARY PROCEDURE (OUTPATIENT)
Dept: PERIOP | Facility: HOSPITAL | Age: 55
End: 2025-05-21
Payer: MEDICARE

## 2025-05-21 ENCOUNTER — HOSPITAL ENCOUNTER (OUTPATIENT)
Facility: HOSPITAL | Age: 55
Setting detail: HOSPITAL OUTPATIENT SURGERY
Discharge: HOME | End: 2025-05-21
Attending: SURGERY | Admitting: SURGERY
Payer: MEDICARE

## 2025-05-21 VITALS
BODY MASS INDEX: 27.92 KG/M2 | WEIGHT: 188.49 LBS | RESPIRATION RATE: 18 BRPM | DIASTOLIC BLOOD PRESSURE: 71 MMHG | OXYGEN SATURATION: 92 % | HEART RATE: 61 BPM | TEMPERATURE: 97 F | SYSTOLIC BLOOD PRESSURE: 121 MMHG | HEIGHT: 69 IN

## 2025-05-21 DIAGNOSIS — T82.590A MALFUNCTION OF ARTERIOVENOUS DIALYSIS FISTULA, INITIAL ENCOUNTER: Primary | ICD-10-CM

## 2025-05-21 LAB
ANION GAP SERPL CALCULATED.3IONS-SCNC: 11.2 MMOL/L (ref 5–15)
BASOPHILS # BLD AUTO: 0.07 10*3/MM3 (ref 0–0.2)
BASOPHILS NFR BLD AUTO: 0.9 % (ref 0–1.5)
BUN SERPL-MCNC: 36 MG/DL (ref 6–20)
BUN/CREAT SERPL: 11.8 (ref 7–25)
CALCIUM SPEC-SCNC: 10.5 MG/DL (ref 8.6–10.5)
CHLORIDE SERPL-SCNC: 102 MMOL/L (ref 98–107)
CO2 SERPL-SCNC: 22.8 MMOL/L (ref 22–29)
CREAT SERPL-MCNC: 3.05 MG/DL (ref 0.76–1.27)
DEPRECATED RDW RBC AUTO: 49 FL (ref 37–54)
EGFRCR SERPLBLD CKD-EPI 2021: 23.8 ML/MIN/1.73
EOSINOPHIL # BLD AUTO: 0.4 10*3/MM3 (ref 0–0.4)
EOSINOPHIL NFR BLD AUTO: 5 % (ref 0.3–6.2)
ERYTHROCYTE [DISTWIDTH] IN BLOOD BY AUTOMATED COUNT: 17 % (ref 12.3–15.4)
GLUCOSE SERPL-MCNC: 140 MG/DL (ref 65–99)
HCT VFR BLD AUTO: 41 % (ref 37.5–51)
HGB BLD-MCNC: 13.1 G/DL (ref 13–17.7)
IMM GRANULOCYTES # BLD AUTO: 0.03 10*3/MM3 (ref 0–0.05)
IMM GRANULOCYTES NFR BLD AUTO: 0.4 % (ref 0–0.5)
LYMPHOCYTES # BLD AUTO: 1.1 10*3/MM3 (ref 0.7–3.1)
LYMPHOCYTES NFR BLD AUTO: 13.8 % (ref 19.6–45.3)
MCH RBC QN AUTO: 25.7 PG (ref 26.6–33)
MCHC RBC AUTO-ENTMCNC: 32 G/DL (ref 31.5–35.7)
MCV RBC AUTO: 80.6 FL (ref 79–97)
MONOCYTES # BLD AUTO: 0.71 10*3/MM3 (ref 0.1–0.9)
MONOCYTES NFR BLD AUTO: 8.9 % (ref 5–12)
NEUTROPHILS NFR BLD AUTO: 5.66 10*3/MM3 (ref 1.7–7)
NEUTROPHILS NFR BLD AUTO: 71 % (ref 42.7–76)
NRBC BLD AUTO-RTO: 0 /100 WBC (ref 0–0.2)
PLATELET # BLD AUTO: 251 10*3/MM3 (ref 140–450)
PMV BLD AUTO: 9.8 FL (ref 6–12)
POTASSIUM SERPL-SCNC: 4.3 MMOL/L (ref 3.5–5.2)
QT INTERVAL: 450 MS
QTC INTERVAL: 432 MS
RBC # BLD AUTO: 5.09 10*6/MM3 (ref 4.14–5.8)
SODIUM SERPL-SCNC: 136 MMOL/L (ref 136–145)
WBC NRBC COR # BLD AUTO: 7.97 10*3/MM3 (ref 3.4–10.8)

## 2025-05-21 PROCEDURE — 25010000002 LIDOCAINE PF 2% 2 % SOLUTION: Performed by: NURSE ANESTHETIST, CERTIFIED REGISTERED

## 2025-05-21 PROCEDURE — 25010000002 FENTANYL CITRATE (PF) 50 MCG/ML SOLUTION: Performed by: NURSE ANESTHETIST, CERTIFIED REGISTERED

## 2025-05-21 PROCEDURE — 25010000002 DEXAMETHASONE PER 1 MG: Performed by: NURSE ANESTHETIST, CERTIFIED REGISTERED

## 2025-05-21 PROCEDURE — 25010000002 HEPARIN (PORCINE) PER 1000 UNITS: Performed by: NURSE ANESTHETIST, CERTIFIED REGISTERED

## 2025-05-21 PROCEDURE — 93005 ELECTROCARDIOGRAM TRACING: CPT | Performed by: SURGERY

## 2025-05-21 PROCEDURE — 25010000002 MIDAZOLAM PER 1MG: Performed by: ANESTHESIOLOGY

## 2025-05-21 PROCEDURE — 85025 COMPLETE CBC W/AUTO DIFF WBC: CPT | Performed by: SURGERY

## 2025-05-21 PROCEDURE — 25010000002 PROPOFOL 10 MG/ML EMULSION: Performed by: NURSE ANESTHETIST, CERTIFIED REGISTERED

## 2025-05-21 PROCEDURE — 80048 BASIC METABOLIC PNL TOTAL CA: CPT | Performed by: SURGERY

## 2025-05-21 PROCEDURE — 25010000002 BUPIVACAINE (PF) 0.5 % SOLUTION 10 ML VIAL: Performed by: SURGERY

## 2025-05-21 PROCEDURE — 25810000003 LACTATED RINGERS PER 1000 ML: Performed by: ANESTHESIOLOGY

## 2025-05-21 PROCEDURE — C1894 INTRO/SHEATH, NON-LASER: HCPCS | Performed by: SURGERY

## 2025-05-21 PROCEDURE — 25010000002 ONDANSETRON PER 1 MG: Performed by: NURSE ANESTHETIST, CERTIFIED REGISTERED

## 2025-05-21 PROCEDURE — 25010000002 SUGAMMADEX 200 MG/2ML SOLUTION: Performed by: NURSE ANESTHETIST, CERTIFIED REGISTERED

## 2025-05-21 PROCEDURE — 25810000003 SODIUM CHLORIDE 0.9 % SOLUTION: Performed by: ANESTHESIOLOGY

## 2025-05-21 PROCEDURE — 25010000002 CEFAZOLIN PER 500 MG: Performed by: SURGERY

## 2025-05-21 PROCEDURE — 25010000002 HEPARIN (PORCINE) PER 1000 UNITS: Performed by: SURGERY

## 2025-05-21 PROCEDURE — 25010000002 LIDOCAINE 1 % SOLUTION 20 ML VIAL: Performed by: SURGERY

## 2025-05-21 PROCEDURE — 25510000002 IOVERSOL 68 % SOLUTION: Performed by: SURGERY

## 2025-05-21 PROCEDURE — C1769 GUIDE WIRE: HCPCS | Performed by: SURGERY

## 2025-05-21 DEVICE — LIGACLIP MCA MULTIPLE CLIP APPLIERS, 20 SMALL CLIPS
Type: IMPLANTABLE DEVICE | Site: ARM | Status: FUNCTIONAL
Brand: LIGACLIP

## 2025-05-21 RX ORDER — ONDANSETRON 2 MG/ML
INJECTION INTRAMUSCULAR; INTRAVENOUS AS NEEDED
Status: DISCONTINUED | OUTPATIENT
Start: 2025-05-21 | End: 2025-05-21 | Stop reason: SURG

## 2025-05-21 RX ORDER — SODIUM CHLORIDE 9 MG/ML
50 INJECTION, SOLUTION INTRAVENOUS ONCE
Status: COMPLETED | OUTPATIENT
Start: 2025-05-21 | End: 2025-05-21

## 2025-05-21 RX ORDER — OXYCODONE HYDROCHLORIDE 5 MG/1
5 TABLET ORAL
Status: DISCONTINUED | OUTPATIENT
Start: 2025-05-21 | End: 2025-05-21 | Stop reason: HOSPADM

## 2025-05-21 RX ORDER — PROMETHAZINE HYDROCHLORIDE 25 MG/1
25 TABLET ORAL ONCE AS NEEDED
Status: DISCONTINUED | OUTPATIENT
Start: 2025-05-21 | End: 2025-05-21 | Stop reason: HOSPADM

## 2025-05-21 RX ORDER — ONDANSETRON 2 MG/ML
4 INJECTION INTRAMUSCULAR; INTRAVENOUS ONCE AS NEEDED
Status: DISCONTINUED | OUTPATIENT
Start: 2025-05-21 | End: 2025-05-21 | Stop reason: HOSPADM

## 2025-05-21 RX ORDER — SODIUM CHLORIDE, SODIUM LACTATE, POTASSIUM CHLORIDE, CALCIUM CHLORIDE 600; 310; 30; 20 MG/100ML; MG/100ML; MG/100ML; MG/100ML
9 INJECTION, SOLUTION INTRAVENOUS CONTINUOUS PRN
Status: DISCONTINUED | OUTPATIENT
Start: 2025-05-21 | End: 2025-05-21 | Stop reason: HOSPADM

## 2025-05-21 RX ORDER — MIDAZOLAM HYDROCHLORIDE 2 MG/2ML
2 INJECTION, SOLUTION INTRAMUSCULAR; INTRAVENOUS ONCE
Status: COMPLETED | OUTPATIENT
Start: 2025-05-21 | End: 2025-05-21

## 2025-05-21 RX ORDER — ACETAMINOPHEN 500 MG
1000 TABLET ORAL ONCE
Status: COMPLETED | OUTPATIENT
Start: 2025-05-21 | End: 2025-05-21

## 2025-05-21 RX ORDER — PROPOFOL 10 MG/ML
VIAL (ML) INTRAVENOUS AS NEEDED
Status: DISCONTINUED | OUTPATIENT
Start: 2025-05-21 | End: 2025-05-21 | Stop reason: SURG

## 2025-05-21 RX ORDER — LIDOCAINE HYDROCHLORIDE 20 MG/ML
INJECTION, SOLUTION EPIDURAL; INFILTRATION; INTRACAUDAL; PERINEURAL AS NEEDED
Status: DISCONTINUED | OUTPATIENT
Start: 2025-05-21 | End: 2025-05-21 | Stop reason: SURG

## 2025-05-21 RX ORDER — HEPARIN SODIUM 5000 [USP'U]/ML
INJECTION, SOLUTION INTRAVENOUS; SUBCUTANEOUS AS NEEDED
Status: DISCONTINUED | OUTPATIENT
Start: 2025-05-21 | End: 2025-05-21 | Stop reason: SURG

## 2025-05-21 RX ORDER — PROMETHAZINE HYDROCHLORIDE 25 MG/1
25 SUPPOSITORY RECTAL ONCE AS NEEDED
Status: DISCONTINUED | OUTPATIENT
Start: 2025-05-21 | End: 2025-05-21 | Stop reason: HOSPADM

## 2025-05-21 RX ORDER — MAGNESIUM HYDROXIDE 1200 MG/15ML
LIQUID ORAL AS NEEDED
Status: DISCONTINUED | OUTPATIENT
Start: 2025-05-21 | End: 2025-05-21 | Stop reason: HOSPADM

## 2025-05-21 RX ORDER — OXYCODONE AND ACETAMINOPHEN 10; 325 MG/1; MG/1
1 TABLET ORAL EVERY 6 HOURS PRN
Qty: 20 TABLET | Refills: 0 | Status: SHIPPED | OUTPATIENT
Start: 2025-05-21

## 2025-05-21 RX ORDER — FENTANYL CITRATE 50 UG/ML
INJECTION, SOLUTION INTRAMUSCULAR; INTRAVENOUS AS NEEDED
Status: DISCONTINUED | OUTPATIENT
Start: 2025-05-21 | End: 2025-05-21 | Stop reason: SURG

## 2025-05-21 RX ORDER — ROCURONIUM BROMIDE 10 MG/ML
INJECTION, SOLUTION INTRAVENOUS AS NEEDED
Status: DISCONTINUED | OUTPATIENT
Start: 2025-05-21 | End: 2025-05-21 | Stop reason: SURG

## 2025-05-21 RX ORDER — FENTANYL CITRATE 50 UG/ML
12.5 INJECTION, SOLUTION INTRAMUSCULAR; INTRAVENOUS
Status: DISCONTINUED | OUTPATIENT
Start: 2025-05-21 | End: 2025-05-21 | Stop reason: HOSPADM

## 2025-05-21 RX ORDER — IPRATROPIUM BROMIDE AND ALBUTEROL SULFATE 2.5; .5 MG/3ML; MG/3ML
3 SOLUTION RESPIRATORY (INHALATION) ONCE AS NEEDED
Status: DISCONTINUED | OUTPATIENT
Start: 2025-05-21 | End: 2025-05-21 | Stop reason: HOSPADM

## 2025-05-21 RX ORDER — DEXAMETHASONE SODIUM PHOSPHATE 4 MG/ML
INJECTION, SOLUTION INTRA-ARTICULAR; INTRALESIONAL; INTRAMUSCULAR; INTRAVENOUS; SOFT TISSUE AS NEEDED
Status: DISCONTINUED | OUTPATIENT
Start: 2025-05-21 | End: 2025-05-21 | Stop reason: SURG

## 2025-05-21 RX ADMIN — SODIUM CHLORIDE, POTASSIUM CHLORIDE, SODIUM LACTATE AND CALCIUM CHLORIDE: 600; 310; 30; 20 INJECTION, SOLUTION INTRAVENOUS at 10:37

## 2025-05-21 RX ADMIN — SUGAMMADEX 200 MG: 100 INJECTION, SOLUTION INTRAVENOUS at 12:32

## 2025-05-21 RX ADMIN — HEPARIN SODIUM 8000 UNITS: 5000 INJECTION INTRAVENOUS; SUBCUTANEOUS at 11:24

## 2025-05-21 RX ADMIN — MIDAZOLAM HYDROCHLORIDE 2 MG: 1 INJECTION, SOLUTION INTRAMUSCULAR; INTRAVENOUS at 10:17

## 2025-05-21 RX ADMIN — ROCURONIUM BROMIDE 80 MG: 10 INJECTION, SOLUTION INTRAVENOUS at 10:38

## 2025-05-21 RX ADMIN — ACETAMINOPHEN 1000 MG: 500 TABLET ORAL at 08:59

## 2025-05-21 RX ADMIN — OXYCODONE 5 MG: 5 TABLET ORAL at 12:49

## 2025-05-21 RX ADMIN — FENTANYL CITRATE 25 MCG: 50 INJECTION, SOLUTION INTRAMUSCULAR; INTRAVENOUS at 11:26

## 2025-05-21 RX ADMIN — PROPOFOL 20 MG: 10 INJECTION, EMULSION INTRAVENOUS at 10:38

## 2025-05-21 RX ADMIN — SODIUM CHLORIDE 50 ML/HR: 9 INJECTION, SOLUTION INTRAVENOUS at 10:30

## 2025-05-21 RX ADMIN — LIDOCAINE HYDROCHLORIDE 20 MG: 20 INJECTION, SOLUTION INTRAVENOUS at 10:38

## 2025-05-21 RX ADMIN — DEXAMETHASONE SODIUM PHOSPHATE 4 MG: 4 INJECTION, SOLUTION INTRAMUSCULAR; INTRAVENOUS at 10:52

## 2025-05-21 RX ADMIN — ONDANSETRON 4 MG: 2 INJECTION INTRAMUSCULAR; INTRAVENOUS at 12:18

## 2025-05-21 RX ADMIN — PROPOFOL 100 MG: 10 INJECTION, EMULSION INTRAVENOUS at 10:37

## 2025-05-21 RX ADMIN — SODIUM CHLORIDE 2 G: 9 INJECTION, SOLUTION INTRAVENOUS at 10:38

## 2025-05-21 NOTE — ANESTHESIA POSTPROCEDURE EVALUATION
Patient: Radha Hutchison    Procedure Summary       Date: 05/21/25 Room / Location: [facility] OR [address] [place] [address] HYBRID OR    Anesthesia Start: 1028 Anesthesia Stop: 1242    Procedures:       FISTULOGRAM (Left)      ARTERIOVENOUS FISTULA REVISION (Left) Diagnosis:       Malfunction of arteriovenous dialysis fistula, sequela      (Malfunction of arteriovenous dialysis fistula, sequela [T82.590S])    Surgeons: Alfred Alejandre MD Provider: Yoanna Mora MD    Anesthesia Type: general ASA Status: 4            Anesthesia Type: general    Vitals  Vitals Value Taken Time   /68 05/21/25 13:30   Temp 36.2 °C (97.2 °F) 05/21/25 13:30   Pulse 57 05/21/25 13:31   Resp 12 05/21/25 13:30   SpO2 91 % 05/21/25 13:31   Vitals shown include unfiled device data.        Post Anesthesia Care and Evaluation    Patient location during evaluation: bedside  Patient participation: complete - patient participated  Level of consciousness: awake  Pain score: 2  Pain management: adequate    Airway patency: patent  Anesthetic complications: No anesthetic complications  PONV Status: none  Cardiovascular status: acceptable and stable  Respiratory status: acceptable  Hydration status: acceptable

## 2025-05-21 NOTE — INTERVAL H&P NOTE
H&P reviewed. The patient was examined and there are no changes to the H&P.       I described procedure, benefits alternatives and risk with the patient.  All his questions were answered.  He agrees with current plan

## 2025-05-21 NOTE — BRIEF OP NOTE
FISTULOGRAM, ARTERIOVENOUS FISTULA FORMATION  Progress Note    Fanyrudi LEE Foster  5/21/2025    Pre-op Diagnosis:   Malfunction of arteriovenous dialysis fistula, sequela [T82.590S]       Post-Op Diagnosis Codes:     * Malfunction of arteriovenous dialysis fistula, sequela [T82.590S]    Procedure(s):      Procedure(s):  FISTULOGRAM  ARTERIOVENOUS FISTULA REVISION    Surgeon(s):  Alfred Alejandre MD    Anesthesia: General    Staff:   Circulator: Keysha Martin RN; Katie Brown RN  Scrub Person: Augusta Lee; Flower Healy  Assistant: Daxa Lei RN CSA  Invasive Technologist: Katrin Davila  Assistant: Daxa Lei RN CSA    Estimated Blood Loss: minimal    Urine Voided: * No values recorded between 5/21/2025 10:28 AM and 5/21/2025 12:30 PM *    Specimens:                None      Drains: * No LDAs found *    Findings:   Fistulogram shows a patent fistula with success change of outflow.  Minimal retrograde flow to the cephalic arch       Complications: None    Assistant: Daxa Lei RN CSA  was responsible for performing the following activities: Retraction, Suction, Irrigation, Suturing, and Closing and their skilled assistance was necessary for the success of this case.    Alfred Alejandre MD     Date: 5/21/2025  Time: 12:39 EDT

## 2025-05-21 NOTE — ANESTHESIA PREPROCEDURE EVALUATION
Anesthesia Evaluation     Patient summary reviewed and Nursing notes reviewed   no history of anesthetic complications:   NPO Solid Status: > 8 hours  NPO Liquid Status: > 2 hours           Airway   Mallampati: II  TM distance: >3 FB  Neck ROM: full  No difficulty expected  Dental    (+) poor dentition    Pulmonary    (+) a smoker Current, Smoked day of surgery, cigarettes,decreased breath sounds    ROS comment: C/o SOB this morning due to fluid overload (no HD since Friday)    Cardiovascular - normal exam  Exercise tolerance: good (4-7 METS)    ECG reviewed  Patient on routine beta blocker and Beta blocker given within 24 hours of surgery  Rhythm: regular  Rate: normal    (+) hypertension (coreg, nifedipine, hydralazine, clonidine) 2 medications or greater, past MI  >12 months, CAD, CHF     ROS comment: EK bpm  Sinus rhythm  Probable left ventricular hypertrophy  ST elev, probable normal early repol pattern  Date and Time of Study:2025 08:17:57    Echo (22):  · Left ventricular wall thickness is consistent with borderline concentric hypertrophy.  · Left ventricular ejection fraction appears to be 56 - 60%.  · Left ventricular diastolic function was normal.  · No significant valvular disease.  · Borderline left atrial enlargement.         Neuro/Psych- negative ROS  GI/Hepatic/Renal/Endo    (+) renal disease (last HD on friday)- ESRD and dialysis    Musculoskeletal (-) negative ROS    Abdominal  - normal exam   Substance History   (+) drug use (marijuana last night)     OB/GYN negative ob/gyn ROS         Other - negative ROS       ROS/Med Hx Other: >4METS.HX CAD,MI ,CHF,ESRD,DIALYSIS 3XW,ECHO  EF 60%,NO VALVE STENOSIS. STRESS  LOW RISK STUDY. DENIES CP, SOAE. KT     25 08:18  WBC: 7.97  RBC: 5.09  Hemoglobin: 13.1  Hematocrit: 41.0  Platelets: 251  RDW: 17.0 (H)  MCV: 80.6  MCH: 25.7 (L)  MCHC: 32.0  MPV: 9.8  RDW-SD: 49.0    25 08:18  Sodium: 136  Potassium: 4.3  Chloride:  102  CO2: 22.8  Anion Gap: 11.2  BUN: 36 (H)  Creatinine: 3.05 (H)  BUN/Creatinine Ratio: 11.8  eGFR: 23.8 (L)  Glucose: 140 (H)  Calcium: 10.5                    Anesthesia Plan    ASA 4     general     (Patient understands anesthesia not responsible for dental damage.)  intravenous induction     Anesthetic plan, risks, benefits, and alternatives have been provided, discussed and informed consent has been obtained with: patient.    Use of blood products discussed with patient .    Plan discussed with CRNA.      CODE STATUS:

## 2025-05-21 NOTE — DISCHARGE INSTRUCTIONS
No heavy lifting.  No strenuous activity.  Take Tylenol for pain.  Can use fistula for dialysis.  If fevers, chills or severe pain call the office or return to the ER.     DISCHARGE INSTRUCTIONS  SURGICAL / AMBULATORY  PROCEDURES      For your surgery you had:  General anesthesia (you may have a sore throat for the first 24 hours)  IV sedation.  Local anesthesia  Monitored anesthesia Care  You received a medicated patch for nausea prevention today (behind your ear). It is recommended that you remove it 24-48 hours post-operatively. It must be removed within 72 hours.   You have received an anesthesia medication today that can cause hormonal forms of birth control to be ineffective. You should use a different form of birth control (to prevent pregnancy) for 7 days.   You may experience dizziness, drowsiness, or light-headedness for several hours following surgery/procedure.  Do not stay alone today or tonight.  Limit your activity for 24 hours.  Resume your diet slowly.  Follow whatever special dietary instructions you may have been given by your doctor.  You should not drive or operate machinery, drink alcohol, or sign legally binding documents for 24 hours or while you are taking pain medication.  Last dose of pain medication was given at:   .  NOTIFY YOUR DOCTOR IF YOU EXPERIENCE ANY OF THE FOLLOWING:  Temperature greater than 101 degrees Fahrenheit  Shaking Chills  Redness or excessive drainage from incision  Nausea, vomiting and/or pain that is not controlled by prescribed medications  Increase in bleeding or bleeding that is excessive  Unable to urinate in 6 hours after surgery  If unable to reach your doctor, please go to the closest Emergency Room  You may begin dressing changes:     [x] in 24 hours   [] in 48 hours   [] Other:    You may remove Band-Aid/dressing tomorrow.  You may shower or bathe   .  Apply an ice pack 24-48 hours.  Medications per physician instructions as indicated on Discharge Medication  Information Sheet.  You should see Dr. GREGG/DR. RAHEEM ANGELES AS NEEDED for follow-up care  Phone number:  [phone]     SPECIAL INSTRUCTIONS: last dose oxycodone 12:49  MAY USES FISTULA AT DIALYSIS

## 2025-05-21 NOTE — OP NOTE
Procure Report    Patient Name:  Radha Hutchison  YOB: 1970    Date of Surgery:  5/21/2025     Indications: Patient 54-year-old gentleman who has a well-developed left radiocephalic AV fistula.  Patient developed severe stenosis of the cephalic arch leading to aneurysmal changes of the cephalic fistula.  Balloon angioplasty of the cephalic arch leads to temporary symptoms.  And after extensive discussion with the patient decision was to revise his fistula and move the outflow to the brachial vein system.    Pre-op Diagnosis:   End-stage renal disease on hemodialysis.  Malfunctioning AV fistula       Post-op Diagnosis:  Same    Surgeon:  Alfred Alejandre MD    First Assist:  April Race RN    Procedure:  #1 AV fistula revision  2.  Fistulogram      Anesthesia: Moderate sedation    Estimated Blood Loss: 10 mL    Specimen: None       Findings:   Severe stenosis of the cephalic arch.  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  Neck chest and left upper extremity were prepped with chlorhexidine and a sterile field were patient's known surgical manner.  A timeout according to hospital policy was made.  A 12 cm transverse incision was made over the axillary region.  And dissection was extended towards the cephalic fistula.  This was sharply dissected with Metzenbaum scissors circumferentially as distal as possible.  Once significant segment was released.  We continued our dissection inferiorly over the brachial vein.  Using Metzenbaums scissors we continued our dissection until a large segment of her brachial vein was identified.  At this point a loading dose of heparin was given, the cephalic fistula was clamped proximally and distally.  And transected as distally as possible.  We oversew our stump with 5-0 Prolene in 2 layers.  Next we completely released  our cephalic fistula and mobilized toward the brachial vein.  Then we open the brachial vein with the blade #11 and extending our venotomy to match our fistula diameter.  Using 6-0 Prolene and end-to-side anastomosis is completed.  Next we advanced 18G needle followed by a microwire and a micro sheath in the undissected cephalic fistula.  And a diagnostic fistulogram shows proper flow distally.  A central venogram shows minimal retrograde flow towards the remanent of the cephalic fistula.  At this moment we decided to complete the surgery.  We rugated the wound with saline mixed with antibiotics.  And closing multiple layers with 3-0 Vicryl and 4-0 Monocryl.  Procedure finished without complications and patient was transferred to the recovery room in a stable condition.    The first assist listed above assisted with all needed aspects of the procedure. Her skilled assistance was necessary for the success of the procedure.         Alfred Alejandre MD     Date: 5/21/2025  Time: 14:43 EDT

## 2025-06-05 LAB
QT INTERVAL: 450 MS
QTC INTERVAL: 432 MS

## 2025-06-12 DIAGNOSIS — F51.01 PRIMARY INSOMNIA: ICD-10-CM

## 2025-06-12 RX ORDER — QUETIAPINE FUMARATE 25 MG/1
25 TABLET, FILM COATED ORAL NIGHTLY
Qty: 30 TABLET | Refills: 1 | Status: SHIPPED | OUTPATIENT
Start: 2025-06-12

## 2025-06-12 NOTE — TELEPHONE ENCOUNTER
Upcoming Appts  With Family Medicine (Sonia Pierce MD)  10/23/2025 at 9:45 AM  Last Office Visit - This Dept  4/23/2025 Sonia Pierce MD

## 2025-07-01 DIAGNOSIS — F51.01 PRIMARY INSOMNIA: ICD-10-CM

## 2025-07-01 RX ORDER — TRAZODONE HYDROCHLORIDE 50 MG/1
50 TABLET ORAL NIGHTLY
Qty: 30 TABLET | Refills: 1 | Status: SHIPPED | OUTPATIENT
Start: 2025-07-01

## (undated) DEVICE — SUT ETHLN 3-0 FS118IN 663H

## (undated) DEVICE — SOL NS 500ML

## (undated) DEVICE — ELECTRD BLD EDGE COAT 3IN

## (undated) DEVICE — ANTIBACTERIAL VIOLET BRAIDED (POLYGLACTIN 910), SYNTHETIC ABSORBABLE SUTURE: Brand: COATED VICRYL

## (undated) DEVICE — LN INJ CONTRST FLXCIL HP F/M LL 1200PSI10

## (undated) DEVICE — VASCULAR ACCESS-LF: Brand: MEDLINE INDUSTRIES, INC.

## (undated) DEVICE — BG OR ZSUT SADDLE 20IN CLR STRL

## (undated) DEVICE — SYR LUERLOK 30CC

## (undated) DEVICE — ENDOPATH XCEL BLADELESS TROCARS WITH STABILITY SLEEVES: Brand: ENDOPATH XCEL

## (undated) DEVICE — VISUALIZATION SYSTEM: Brand: CLEARIFY

## (undated) DEVICE — GAUZE,SPONGE,4"X4",16PLY,STRL,LF,10/TRAY: Brand: MEDLINE

## (undated) DEVICE — DORADO® PTA BALLOON DILATATION CATHETER 8 MM X 40 MM, 80 CM CATHETER: Brand: DORADO®

## (undated) DEVICE — SOL NACL 0.9PCT 1000ML

## (undated) DEVICE — SUT MNCRYL PLS ANTIB UD 4/0 PS2 18IN

## (undated) DEVICE — ST IRR CYSTO W/SPK 77IN LF

## (undated) DEVICE — SKIN AFFIX SURG ADHESIVE 72/CS 0.55ML: Brand: MEDLINE

## (undated) DEVICE — BIOPATCH™ ANTIMICROBIAL DRESSING WITH CHLORHEXIDINE GLUCONATE IS A HYDROPHILLIC POLYURETHANE ABSORPTIVE FOAM WITH CHLORHEXIDINE GLUCONATE (CHG) WHICH INHIBITS BACTERIAL GROWTH UNDER THE DRESSING. THE DRESSING IS INTENDED TO BE USED TO ABSORB EXUDATE, COVER A WOUND CAUSED BY VASCULAR AND NONVASCULAR PERCUTANEOUS MEDICAL DEVICES DURING SURGERY, AS WELL AS REDUCE LOCAL INFECTION AND COLONIZATION OF MICROORGANISMS.: Brand: BIOPATCH

## (undated) DEVICE — ENDOPATH XCEL UNIVERSAL TROCAR STABLILITY SLEEVES: Brand: ENDOPATH XCEL

## (undated) DEVICE — SLV SCD KN/LEN ADJ EXPRSS BLENDED MD 1P/U

## (undated) DEVICE — LIGHT SLEEVE: Brand: DEVON

## (undated) DEVICE — ARM VASCULAR-LF: Brand: MEDLINE INDUSTRIES, INC.

## (undated) DEVICE — STPLR SKIN VISISTAT WD 35CT

## (undated) DEVICE — INTENDED FOR TISSUE SEPARATION, AND OTHER PROCEDURES THAT REQUIRE A SHARP SURGICAL BLADE TO PUNCTURE OR CUT.: Brand: BARD-PARKER ® CARBON RIB-BACK BLADES

## (undated) DEVICE — GOWN,REINFORCE,POLY,SIRUS,BREATH SLV,XLG: Brand: MEDLINE

## (undated) DEVICE — GW STARTER BENSTON PTFE/COAT STR/TP .035 15X180CM

## (undated) DEVICE — PINNACLE R/O II INTRODUCER SHEATH WITH RADIOPAQUE MARKER: Brand: PINNACLE

## (undated) DEVICE — ST ACC MICROPUNCTURE STFF .018 ECHO/PLAT/TP 4F/10CM 21G/7CM

## (undated) DEVICE — GLV SURG SENSICARE PI ORTHO SZ7.5 LF STRL

## (undated) DEVICE — Device

## (undated) DEVICE — CONTAINER,SPEC,PNEUM TUBE,4OZ,STRL PATH: Brand: MEDLINE

## (undated) DEVICE — GLV SURG SENSICARE PI ORTHO SZ6.5 LF STRL

## (undated) DEVICE — HEMOSPLIT XK HEMODIALYSIS CATH, ST, 16 FR. 19CM: Brand: HEMOSPLIT XK LONG-TERM HEMODIALYSIS CATHETER

## (undated) DEVICE — SUT PROLN 6/0 C1 D/A 30IN 8706H

## (undated) DEVICE — ENDOCUT SCISSOR TIP, DISPOSABLE: Brand: RENEW

## (undated) DEVICE — SUT VIC 3/0 SH 27IN J416H

## (undated) DEVICE — SYS CLS CARTR/THOMSN SG 10/12 AND 15MM

## (undated) DEVICE — SOL NACL INJ .9PCT 500ML

## (undated) DEVICE — BALN EVERCROSS OTW .035 6F 8X60 80

## (undated) DEVICE — PENCL E/S HNDSWCH ROCKR CB

## (undated) DEVICE — ADHS SKIN SURG TISS VISC PREMIERPRO EXOFIN HI/VISC FAST/DRY

## (undated) DEVICE — GOWN,REINFRCE,POLY,SIRUS,BREATH SLV,XXLG: Brand: MEDLINE

## (undated) DEVICE — DISPOSABLE MONOPOLAR ENDOSCOPIC CORD 10 FT. (3M): Brand: KIRWAN

## (undated) DEVICE — GLV SURG BIOGEL LTX PF 7

## (undated) DEVICE — GLV SURG SENSICARE PI ORTHO SZ8 LF STRL

## (undated) DEVICE — RADIFOCUS GLIDECATH: Brand: GLIDECATH

## (undated) DEVICE — SYR HEP MEDALLION 20ML RED

## (undated) DEVICE — DORADO® PTA BALLOON DILATATION CATHETER 10 MM X 40 MM, 80 CM CATHETER: Brand: DORADO®

## (undated) DEVICE — PENCL SMOKE/EVAC MEGADYNE TELESCP 10FT

## (undated) DEVICE — STERILE POLYISOPRENE POWDER-FREE SURGICAL GLOVES WITH EMOLLIENT COATING: Brand: PROTEXIS

## (undated) DEVICE — SNAP KOVER: Brand: UNBRANDED

## (undated) DEVICE — SUT SILK 2/0 TIES 18IN A185H

## (undated) DEVICE — PENCL E/S SMOKEEVAC W/TELESCP CANN

## (undated) DEVICE — ATLAS®  PTA BALLOON DILATATION CATHETER 12 MM X 40 MM, 75 CM CATHETER: Brand: ATLAS®

## (undated) DEVICE — RADIFOCUS GLIDEWIRE ADVANTAGE GUIDEWIRE: Brand: GLIDEWIRE ADVANTAGE

## (undated) DEVICE — THE STERILE LIGHT HANDLE COVER IS USED WITH STERIS SURGICAL LIGHTING AND VISUALIZATION SYSTEMS.

## (undated) DEVICE — STPCK 3WY MARQUIS STD/PRESS SWIVELNUT LT BLU

## (undated) DEVICE — ANTIBACTERIAL UNDYED BRAIDED (POLYGLACTIN 910), SYNTHETIC ABSORBABLE SUTURE: Brand: COATED VICRYL

## (undated) DEVICE — NDL HYPO PRECISIONGLIDE REG 25G 1 1/2

## (undated) DEVICE — STERILE POLYISOPRENE POWDER-FREE SURGICAL GLOVES: Brand: PROTEXIS

## (undated) DEVICE — TRAP FLD MINIVAC MEGADYNE 100ML

## (undated) DEVICE — HEMOSPLIT XK HEMODIALYSIS CATH, ST, 16 FR. 23CM: Brand: HEMOSPLIT XK LONG-TERM HEMODIALYSIS CATHETER

## (undated) DEVICE — SUT SILK 3/0 TIES 18IN A184H

## (undated) DEVICE — GLV SURG SENSICARE ORTHO PF LF 7 STRL

## (undated) DEVICE — MAJOR-LF: Brand: MEDLINE INDUSTRIES, INC.

## (undated) DEVICE — GW STARTER JB STR .035 15X180CM

## (undated) DEVICE — GLV SURG BIOGEL LTX PF 7 1/2

## (undated) DEVICE — SUT PROLN 7/0 BV1 D/A 24IN 8702H

## (undated) DEVICE — SOL IRR NACL 0.9PCT BT 1000ML

## (undated) DEVICE — APPL CHLORAPREP HI/LITE 26ML ORNG

## (undated) DEVICE — ANGIOGRAPHY PACK: Brand: MEDLINE INDUSTRIES, INC.

## (undated) DEVICE — BALN EVERCROSS OTW .035 6F 9X60MM 80CM

## (undated) DEVICE — ST ACC MICROPUNCTURE .018 TRANSLSS/PLAT/TP 4F/10CM 21G/10CM

## (undated) DEVICE — LOU LAP CHOLE: Brand: MEDLINE INDUSTRIES, INC.